# Patient Record
Sex: FEMALE | Race: WHITE | NOT HISPANIC OR LATINO | Employment: UNEMPLOYED | URBAN - METROPOLITAN AREA
[De-identification: names, ages, dates, MRNs, and addresses within clinical notes are randomized per-mention and may not be internally consistent; named-entity substitution may affect disease eponyms.]

---

## 2017-06-14 ENCOUNTER — HOSPITAL ENCOUNTER (OUTPATIENT)
Dept: RADIOLOGY | Facility: MEDICAL CENTER | Age: 57
End: 2017-06-14
Attending: PHYSICIAN ASSISTANT
Payer: MEDICAID

## 2017-06-14 DIAGNOSIS — Z12.31 ENCOUNTER FOR MAMMOGRAM TO ESTABLISH BASELINE MAMMOGRAM: ICD-10-CM

## 2017-06-14 PROCEDURE — 77063 BREAST TOMOSYNTHESIS BI: CPT

## 2017-06-22 ENCOUNTER — HOSPITAL ENCOUNTER (OUTPATIENT)
Dept: LAB | Facility: MEDICAL CENTER | Age: 57
End: 2017-06-22
Attending: UROLOGY
Payer: MEDICAID

## 2017-06-22 LAB
BUN SERPL-MCNC: 16 MG/DL (ref 8–22)
CREAT SERPL-MCNC: 0.74 MG/DL (ref 0.5–1.4)
GFR SERPL CREATININE-BSD FRML MDRD: >60 ML/MIN/1.73 M 2

## 2017-06-22 PROCEDURE — 84520 ASSAY OF UREA NITROGEN: CPT

## 2017-06-22 PROCEDURE — 82565 ASSAY OF CREATININE: CPT

## 2017-06-22 PROCEDURE — 36415 COLL VENOUS BLD VENIPUNCTURE: CPT

## 2017-07-12 ENCOUNTER — TELEPHONE (OUTPATIENT)
Dept: RADIOLOGY | Facility: MEDICAL CENTER | Age: 57
End: 2017-07-12

## 2017-08-22 ENCOUNTER — HOSPITAL ENCOUNTER (OUTPATIENT)
Dept: LAB | Facility: MEDICAL CENTER | Age: 57
End: 2017-08-22
Attending: UROLOGY
Payer: MEDICAID

## 2017-08-22 LAB
BUN SERPL-MCNC: 16 MG/DL (ref 8–22)
CREAT SERPL-MCNC: 0.73 MG/DL (ref 0.5–1.4)
GFR SERPL CREATININE-BSD FRML MDRD: >60 ML/MIN/1.73 M 2

## 2017-08-22 PROCEDURE — 84520 ASSAY OF UREA NITROGEN: CPT

## 2017-08-22 PROCEDURE — 82565 ASSAY OF CREATININE: CPT

## 2017-08-22 PROCEDURE — 36415 COLL VENOUS BLD VENIPUNCTURE: CPT

## 2017-08-23 ENCOUNTER — APPOINTMENT (OUTPATIENT)
Dept: RADIOLOGY | Facility: MEDICAL CENTER | Age: 57
End: 2017-08-23
Attending: UROLOGY
Payer: MEDICAID

## 2017-08-31 ENCOUNTER — HOSPITAL ENCOUNTER (OUTPATIENT)
Dept: RADIOLOGY | Facility: MEDICAL CENTER | Age: 57
End: 2017-08-31
Attending: UROLOGY
Payer: MEDICAID

## 2017-08-31 DIAGNOSIS — N28.9 KIDNEY DISORDER: ICD-10-CM

## 2017-08-31 PROCEDURE — 74183 MRI ABD W/O CNTR FLWD CNTR: CPT

## 2017-08-31 PROCEDURE — 700117 HCHG RX CONTRAST REV CODE 255: Performed by: UROLOGY

## 2017-08-31 PROCEDURE — A9579 GAD-BASE MR CONTRAST NOS,1ML: HCPCS | Performed by: UROLOGY

## 2017-08-31 RX ADMIN — GADODIAMIDE 15 ML: 287 INJECTION INTRAVENOUS at 14:11

## 2019-02-26 ENCOUNTER — HOSPITAL ENCOUNTER (EMERGENCY)
Facility: MEDICAL CENTER | Age: 59
End: 2019-02-26
Attending: EMERGENCY MEDICINE
Payer: MEDICAID

## 2019-02-26 VITALS
BODY MASS INDEX: 25.27 KG/M2 | WEIGHT: 133.82 LBS | DIASTOLIC BLOOD PRESSURE: 98 MMHG | HEIGHT: 61 IN | SYSTOLIC BLOOD PRESSURE: 155 MMHG | OXYGEN SATURATION: 97 % | RESPIRATION RATE: 18 BRPM | TEMPERATURE: 98 F | HEART RATE: 88 BPM

## 2019-02-26 DIAGNOSIS — M79.10 TRIGGER POINT: ICD-10-CM

## 2019-02-26 DIAGNOSIS — M54.12 CERVICAL RADICULOPATHY: ICD-10-CM

## 2019-02-26 PROCEDURE — 99284 EMERGENCY DEPT VISIT MOD MDM: CPT

## 2019-02-26 PROCEDURE — 96374 THER/PROPH/DIAG INJ IV PUSH: CPT

## 2019-02-26 PROCEDURE — 96372 THER/PROPH/DIAG INJ SC/IM: CPT | Mod: XU

## 2019-02-26 PROCEDURE — 700102 HCHG RX REV CODE 250 W/ 637 OVERRIDE(OP): Performed by: EMERGENCY MEDICINE

## 2019-02-26 PROCEDURE — 700111 HCHG RX REV CODE 636 W/ 250 OVERRIDE (IP): Performed by: EMERGENCY MEDICINE

## 2019-02-26 PROCEDURE — A9270 NON-COVERED ITEM OR SERVICE: HCPCS | Performed by: EMERGENCY MEDICINE

## 2019-02-26 PROCEDURE — 20552 NJX 1/MLT TRIGGER POINT 1/2: CPT

## 2019-02-26 RX ORDER — METHYLPREDNISOLONE 4 MG/1
TABLET ORAL
Qty: 1 KIT | Refills: 0 | Status: SHIPPED | OUTPATIENT
Start: 2019-02-26 | End: 2019-04-15

## 2019-02-26 RX ORDER — BUPIVACAINE HYDROCHLORIDE 2.5 MG/ML
10 INJECTION, SOLUTION EPIDURAL; INFILTRATION; INTRACAUDAL ONCE
Status: COMPLETED | OUTPATIENT
Start: 2019-02-26 | End: 2019-02-26

## 2019-02-26 RX ORDER — DEXAMETHASONE 4 MG/1
8 TABLET ORAL ONCE
Status: COMPLETED | OUTPATIENT
Start: 2019-02-26 | End: 2019-02-26

## 2019-02-26 RX ORDER — KETOROLAC TROMETHAMINE 30 MG/ML
15 INJECTION, SOLUTION INTRAMUSCULAR; INTRAVENOUS ONCE
Status: COMPLETED | OUTPATIENT
Start: 2019-02-26 | End: 2019-02-26

## 2019-02-26 RX ORDER — HYDROCODONE BITARTRATE AND ACETAMINOPHEN 5; 325 MG/1; MG/1
1-2 TABLET ORAL EVERY 4 HOURS PRN
Qty: 15 TAB | Refills: 0 | Status: SHIPPED | OUTPATIENT
Start: 2019-02-26 | End: 2019-03-01

## 2019-02-26 RX ORDER — TRIAMCINOLONE ACETONIDE 40 MG/ML
40 INJECTION, SUSPENSION INTRA-ARTICULAR; INTRAMUSCULAR ONCE
Status: COMPLETED | OUTPATIENT
Start: 2019-02-26 | End: 2019-02-26

## 2019-02-26 RX ADMIN — KETOROLAC TROMETHAMINE 15 MG: 30 INJECTION, SOLUTION INTRAMUSCULAR at 16:26

## 2019-02-26 RX ADMIN — BUPIVACAINE HYDROCHLORIDE 10 ML: 2.5 INJECTION, SOLUTION EPIDURAL; INFILTRATION; INTRACAUDAL; PERINEURAL at 16:30

## 2019-02-26 RX ADMIN — DEXAMETHASONE 8 MG: 4 TABLET ORAL at 16:26

## 2019-02-26 RX ADMIN — TRIAMCINOLONE ACETONIDE 40 MG: 40 INJECTION, SUSPENSION INTRA-ARTICULAR; INTRAMUSCULAR at 16:30

## 2019-02-26 NOTE — ED TRIAGE NOTES
Pt ambulatory to triage. Pt c/o right neck pain that radiates to should for 1 month. Pt seen by [place] [id number] for the same thing.     Chief Complaint   Patient presents with   • Neck Pain     Pt placed in lobby, updated on triage process. Pt educated to notified RN or triage tech if changes in condition.

## 2019-02-27 NOTE — ED NOTES
Imani Stephen discharged via ambulation with self.  Discharge instructions given and reviewed, patient educated to follow up with [facility], verbalized understanding.  Prescriptions given x 2.  All personal belongings in possession.  No questions at this time.
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
## 2019-04-15 ENCOUNTER — APPOINTMENT (OUTPATIENT)
Dept: RADIOLOGY | Facility: MEDICAL CENTER | Age: 59
End: 2019-04-15
Attending: EMERGENCY MEDICINE
Payer: MEDICAID

## 2019-04-15 ENCOUNTER — HOSPITAL ENCOUNTER (EMERGENCY)
Facility: MEDICAL CENTER | Age: 59
End: 2019-04-15
Attending: EMERGENCY MEDICINE
Payer: MEDICAID

## 2019-04-15 VITALS
HEART RATE: 117 BPM | HEIGHT: 61 IN | TEMPERATURE: 97.9 F | DIASTOLIC BLOOD PRESSURE: 110 MMHG | SYSTOLIC BLOOD PRESSURE: 198 MMHG | OXYGEN SATURATION: 94 % | RESPIRATION RATE: 15 BRPM | WEIGHT: 130.07 LBS | BODY MASS INDEX: 24.56 KG/M2

## 2019-04-15 DIAGNOSIS — R00.0 TACHYCARDIA: ICD-10-CM

## 2019-04-15 DIAGNOSIS — M25.512 ACUTE PAIN OF LEFT SHOULDER: ICD-10-CM

## 2019-04-15 LAB
ALBUMIN SERPL BCP-MCNC: 4.3 G/DL (ref 3.2–4.9)
ALBUMIN/GLOB SERPL: 1.4 G/DL
ALP SERPL-CCNC: 68 U/L (ref 30–99)
ALT SERPL-CCNC: 15 U/L (ref 2–50)
AMPHET UR QL SCN: POSITIVE
ANION GAP SERPL CALC-SCNC: 8 MMOL/L (ref 0–11.9)
APPEARANCE UR: CLEAR
AST SERPL-CCNC: 16 U/L (ref 12–45)
BACTERIA #/AREA URNS HPF: NEGATIVE /HPF
BARBITURATES UR QL SCN: NEGATIVE
BASOPHILS # BLD AUTO: 0.7 % (ref 0–1.8)
BASOPHILS # BLD: 0.05 K/UL (ref 0–0.12)
BENZODIAZ UR QL SCN: NEGATIVE
BILIRUB SERPL-MCNC: 0.4 MG/DL (ref 0.1–1.5)
BILIRUB UR QL STRIP.AUTO: NEGATIVE
BUN SERPL-MCNC: 15 MG/DL (ref 8–22)
BZE UR QL SCN: NEGATIVE
CALCIUM SERPL-MCNC: 9.1 MG/DL (ref 8.5–10.5)
CANNABINOIDS UR QL SCN: NEGATIVE
CHLORIDE SERPL-SCNC: 107 MMOL/L (ref 96–112)
CO2 SERPL-SCNC: 24 MMOL/L (ref 20–33)
COLOR UR: YELLOW
CREAT SERPL-MCNC: 0.71 MG/DL (ref 0.5–1.4)
D DIMER PPP IA.FEU-MCNC: <0.4 UG/ML (FEU) (ref 0–0.5)
EKG IMPRESSION: NORMAL
EOSINOPHIL # BLD AUTO: 0.16 K/UL (ref 0–0.51)
EOSINOPHIL NFR BLD: 2.2 % (ref 0–6.9)
EPI CELLS #/AREA URNS HPF: ABNORMAL /HPF
ERYTHROCYTE [DISTWIDTH] IN BLOOD BY AUTOMATED COUNT: 50.5 FL (ref 35.9–50)
GLOBULIN SER CALC-MCNC: 3 G/DL (ref 1.9–3.5)
GLUCOSE SERPL-MCNC: 117 MG/DL (ref 65–99)
GLUCOSE UR STRIP.AUTO-MCNC: NEGATIVE MG/DL
HCT VFR BLD AUTO: 41 % (ref 37–47)
HGB BLD-MCNC: 13.4 G/DL (ref 12–16)
HYALINE CASTS #/AREA URNS LPF: ABNORMAL /LPF
IMM GRANULOCYTES # BLD AUTO: 0.01 K/UL (ref 0–0.11)
IMM GRANULOCYTES NFR BLD AUTO: 0.1 % (ref 0–0.9)
KETONES UR STRIP.AUTO-MCNC: NEGATIVE MG/DL
LEUKOCYTE ESTERASE UR QL STRIP.AUTO: ABNORMAL
LYMPHOCYTES # BLD AUTO: 3.57 K/UL (ref 1–4.8)
LYMPHOCYTES NFR BLD: 49.6 % (ref 22–41)
MCH RBC QN AUTO: 31.8 PG (ref 27–33)
MCHC RBC AUTO-ENTMCNC: 32.7 G/DL (ref 33.6–35)
MCV RBC AUTO: 97.2 FL (ref 81.4–97.8)
METHADONE UR QL SCN: NEGATIVE
MICRO URNS: ABNORMAL
MONOCYTES # BLD AUTO: 0.79 K/UL (ref 0–0.85)
MONOCYTES NFR BLD AUTO: 11 % (ref 0–13.4)
NEUTROPHILS # BLD AUTO: 2.62 K/UL (ref 2–7.15)
NEUTROPHILS NFR BLD: 36.4 % (ref 44–72)
NITRITE UR QL STRIP.AUTO: NEGATIVE
NRBC # BLD AUTO: 0 K/UL
NRBC BLD-RTO: 0 /100 WBC
OPIATES UR QL SCN: NEGATIVE
OXYCODONE UR QL SCN: NEGATIVE
PCP UR QL SCN: NEGATIVE
PH UR STRIP.AUTO: 6 [PH]
PLATELET # BLD AUTO: 365 K/UL (ref 164–446)
PMV BLD AUTO: 9.5 FL (ref 9–12.9)
POTASSIUM SERPL-SCNC: 3.7 MMOL/L (ref 3.6–5.5)
PROPOXYPH UR QL SCN: NEGATIVE
PROT SERPL-MCNC: 7.3 G/DL (ref 6–8.2)
PROT UR QL STRIP: NEGATIVE MG/DL
RBC # BLD AUTO: 4.22 M/UL (ref 4.2–5.4)
RBC # URNS HPF: ABNORMAL /HPF
RBC UR QL AUTO: NEGATIVE
SODIUM SERPL-SCNC: 139 MMOL/L (ref 135–145)
SP GR UR STRIP.AUTO: 1.01
TROPONIN I SERPL-MCNC: <0.01 NG/ML (ref 0–0.04)
UROBILINOGEN UR STRIP.AUTO-MCNC: 0.2 MG/DL
WBC # BLD AUTO: 7.2 K/UL (ref 4.8–10.8)
WBC #/AREA URNS HPF: ABNORMAL /HPF

## 2019-04-15 PROCEDURE — 93005 ELECTROCARDIOGRAM TRACING: CPT

## 2019-04-15 PROCEDURE — 700102 HCHG RX REV CODE 250 W/ 637 OVERRIDE(OP): Performed by: EMERGENCY MEDICINE

## 2019-04-15 PROCEDURE — 85379 FIBRIN DEGRADATION QUANT: CPT

## 2019-04-15 PROCEDURE — 99284 EMERGENCY DEPT VISIT MOD MDM: CPT

## 2019-04-15 PROCEDURE — 84484 ASSAY OF TROPONIN QUANT: CPT

## 2019-04-15 PROCEDURE — A9270 NON-COVERED ITEM OR SERVICE: HCPCS | Performed by: EMERGENCY MEDICINE

## 2019-04-15 PROCEDURE — 304561 HCHG STAT O2

## 2019-04-15 PROCEDURE — 36415 COLL VENOUS BLD VENIPUNCTURE: CPT

## 2019-04-15 PROCEDURE — 87086 URINE CULTURE/COLONY COUNT: CPT

## 2019-04-15 PROCEDURE — 71045 X-RAY EXAM CHEST 1 VIEW: CPT

## 2019-04-15 PROCEDURE — 93005 ELECTROCARDIOGRAM TRACING: CPT | Performed by: EMERGENCY MEDICINE

## 2019-04-15 PROCEDURE — 80053 COMPREHEN METABOLIC PANEL: CPT

## 2019-04-15 PROCEDURE — 80307 DRUG TEST PRSMV CHEM ANLYZR: CPT

## 2019-04-15 PROCEDURE — 85025 COMPLETE CBC W/AUTO DIFF WBC: CPT

## 2019-04-15 PROCEDURE — 81001 URINALYSIS AUTO W/SCOPE: CPT | Mod: XU

## 2019-04-15 RX ORDER — METHOCARBAMOL 750 MG/1
1500 TABLET, FILM COATED ORAL 3 TIMES DAILY PRN
Qty: 22 TAB | Refills: 0 | Status: SHIPPED | OUTPATIENT
Start: 2019-04-15 | End: 2019-12-02

## 2019-04-15 RX ORDER — ASPIRIN 325 MG
325 TABLET ORAL ONCE
Status: COMPLETED | OUTPATIENT
Start: 2019-04-15 | End: 2019-04-15

## 2019-04-15 RX ORDER — SODIUM CHLORIDE 9 MG/ML
1000 INJECTION, SOLUTION INTRAVENOUS ONCE
Status: DISCONTINUED | OUTPATIENT
Start: 2019-04-15 | End: 2019-04-15 | Stop reason: HOSPADM

## 2019-04-15 RX ORDER — METHOCARBAMOL 750 MG/1
750 TABLET, FILM COATED ORAL ONCE
Status: COMPLETED | OUTPATIENT
Start: 2019-04-15 | End: 2019-04-15

## 2019-04-15 RX ADMIN — ASPIRIN 325 MG: 325 TABLET ORAL at 03:08

## 2019-04-15 RX ADMIN — METHOCARBAMOL TABLETS 750 MG: 750 TABLET, COATED ORAL at 03:15

## 2019-04-15 ASSESSMENT — ENCOUNTER SYMPTOMS
CHILLS: 1
SHORTNESS OF BREATH: 0
NECK PAIN: 1
FEVER: 0
DIAPHORESIS: 0
PALPITATIONS: 0
VOMITING: 0

## 2019-04-15 NOTE — ED TRIAGE NOTES
"Pt chidi wilson from home   Chief Complaint   Patient presents with   • Shoulder Pain     left, radiating into neck.      C/0 9/10 left shoulder pain, medicated by ems with 10mcg fentanyl and 4mg zofran with no relief.     BP (!) 198/110   Pulse (!) 114   Temp 36.6 °C (97.9 °F) (Temporal)   Resp 18   Ht 1.549 m (5' 1\")   Wt 59 kg (130 lb 1.1 oz)   SpO2 98%      History of htn, does not take medication, chest pain protocol started.   "

## 2019-04-15 NOTE — ED NOTES
"Attempted to medicate pt with fluids, pt refusing, states \"cant I just skip the fluids and go home and go to bed\" attempted to educate pt on need for rehydration. Pt continues to refuse and requests to leave AMA, ERP called to bedside to discussed risks of leaving ama, pt wishes to leave, AMA paperwork completed, iv removed and bandage in place .  "

## 2019-04-15 NOTE — ED PROVIDER NOTES
"ED Provider Note    Scribed for RENNY Sorensen II* by Abby Moore. 4/15/2019  2:44 AM    Means of Arrival: EMS  History obtained by: Patient  Limitations: None    CHIEF COMPLAINT  Chief Complaint   Patient presents with   • Shoulder Pain     left, radiating into neck.        HPI  Imani Stephen is a 58 y.o. female who presents to the Emergency Department with left shoulder pain onset one day ago. Mrs. Stephen states she woke up in the morning with a \"stiff neck.\" She endorses pain radiating to left neck and chills. Mrs. Stephen describes the pain as progressively worsening in severity, that is exacerbated when moving her left upper extremity or turning her head to the side. She denies any shortness of breath, diaphoresis, vomiting, palpitations, fevers, dysuria, dyspnea, or pain with deep breath. She reports taking OTC \"pain reliever\" medication and applying heating pads for pain management.     Mrs. Stephen denies any family history of blood clots, or any hormone supplement use. She reports a history of hypertension, but notes it is not controlled with any prescribed medications. She denies any alcohol use, but reports she smokes cigarettes daily. Mrs. Stephen reports a family history of cardiac problems, stating her sister \" of heart failure.\"     REVIEW OF SYSTEMS  Review of Systems   Constitutional: Positive for chills. Negative for diaphoresis and fever.   Respiratory: Negative for shortness of breath.         Negative for dyspnea, or pain with deep breaths.     Cardiovascular: Negative for palpitations.   Gastrointestinal: Negative for vomiting.   Genitourinary: Negative for dysuria.   Musculoskeletal: Positive for neck pain (\"stiff\").        Positive for left shoulder pain.    All other systems reviewed and are negative.    See HPI for further details.    PAST MEDICAL HISTORY   has a past medical history of Allergy; Arthritis; Depression (3/17/2010); Headache(784.0); Hyperlipidemia; Other and " "unspecified hyperlipidemia (4/20/2010); Renal cell carcinoma (10/21/2011); Renal mass; and Unspecified vitamin D deficiency (4/20/2010).    SOCIAL HISTORY  Social History     Social History Main Topics   • Smoking status: Current Every Day Smoker     Packs/day: 0.50     Years: 35.00     Types: Cigarettes   • Smokeless tobacco: Never Used   • Alcohol use No   • Drug use: No   • Sexual activity: Yes     Birth control/ protection: Post-Menopausal       SURGICAL HISTORY   has a past surgical history that includes other abdominal surgery; primary c section; abdominal exploration (1975); tubal coagulation laparoscopic bilateral; and recovery (10/4/2012).    CURRENT MEDICATIONS  Home Medications     Reviewed by Nicol Sims R.N. (Registered Nurse) on 04/15/19 at 0226  Med List Status: Complete   Medication Last Dose Status        Patient Basim Taking any Medications                       ALLERGIES  Allergies   Allergen Reactions   • Codeine Itching and Vomiting   • Pcn [Penicillins] Itching       PHYSICAL EXAM  VITAL SIGNS: BP (!) 198/110   Pulse (!) 112   Temp 36.6 °C (97.9 °F) (Temporal)   Resp 15   Ht 1.549 m (5' 1\")   Wt 59 kg (130 lb 1.1 oz)   SpO2 92%   BMI 24.58 kg/m²       Pulse ox interpretation: I interpret this pulse ox as normal.  Constitutional: Alert. Thin body habitus, pleasant 58 year old woman.  HENT: No signs of trauma, Bilateral external ears normal, Nose normal.   Eyes: Pupils are equal, Conjunctiva normal, Non-icteric.   Neck: Full range of motion, No midline spine tenderness, Supple, No stridor.   Lymphatic: No lymphadenopathy noted.   Cardiovascular: Increased heart rate to rate 120-130. Regular rhythm, no murmurs. Symmetric distal pulses. No cyanosis of extremities. No peripheral edema of extremities.  Thorax & Lungs: Normal breath sounds, No respiratory distress, No wheezing, No chest tenderness.   Abdomen: Bowel sounds normal, Soft, No tenderness, No masses, No pulsatile masses. No " peritoneal signs.  Skin: Warm, Dry, No erythema, No rash.   Back: No midline bony tenderness, No CVA tenderness.   Musculoskeletal: Tenderness to palpation along left trapezius and left shoulder blade. Pain reproducible with raising left arm. No major deformities noted.   Neurologic: Alert , Normal motor function, Normal sensory function, No focal deficits noted.   Psychiatric: Affect normal, Judgment normal, Mood normal.     DIAGNOSTIC STUDIES / PROCEDURES    LABS  Results for orders placed or performed during the hospital encounter of 04/15/19   CBC with Differential   Result Value Ref Range    WBC 7.2 4.8 - 10.8 K/uL    RBC 4.22 4.20 - 5.40 M/uL    Hemoglobin 13.4 12.0 - 16.0 g/dL    Hematocrit 41.0 37.0 - 47.0 %    MCV 97.2 81.4 - 97.8 fL    MCH 31.8 27.0 - 33.0 pg    MCHC 32.7 (L) 33.6 - 35.0 g/dL    RDW 50.5 (H) 35.9 - 50.0 fL    Platelet Count 365 164 - 446 K/uL    MPV 9.5 9.0 - 12.9 fL    Neutrophils-Polys 36.40 (L) 44.00 - 72.00 %    Lymphocytes 49.60 (H) 22.00 - 41.00 %    Monocytes 11.00 0.00 - 13.40 %    Eosinophils 2.20 0.00 - 6.90 %    Basophils 0.70 0.00 - 1.80 %    Immature Granulocytes 0.10 0.00 - 0.90 %    Nucleated RBC 0.00 /100 WBC    Neutrophils (Absolute) 2.62 2.00 - 7.15 K/uL    Lymphs (Absolute) 3.57 1.00 - 4.80 K/uL    Monos (Absolute) 0.79 0.00 - 0.85 K/uL    Eos (Absolute) 0.16 0.00 - 0.51 K/uL    Baso (Absolute) 0.05 0.00 - 0.12 K/uL    Immature Granulocytes (abs) 0.01 0.00 - 0.11 K/uL    NRBC (Absolute) 0.00 K/uL   Complete Metabolic Panel (CMP)   Result Value Ref Range    Sodium 139 135 - 145 mmol/L    Potassium 3.7 3.6 - 5.5 mmol/L    Chloride 107 96 - 112 mmol/L    Co2 24 20 - 33 mmol/L    Anion Gap 8.0 0.0 - 11.9    Glucose 117 (H) 65 - 99 mg/dL    Bun 15 8 - 22 mg/dL    Creatinine 0.71 0.50 - 1.40 mg/dL    Calcium 9.1 8.5 - 10.5 mg/dL    AST(SGOT) 16 12 - 45 U/L    ALT(SGPT) 15 2 - 50 U/L    Alkaline Phosphatase 68 30 - 99 U/L    Total Bilirubin 0.4 0.1 - 1.5 mg/dL    Albumin 4.3  3.2 - 4.9 g/dL    Total Protein 7.3 6.0 - 8.2 g/dL    Globulin 3.0 1.9 - 3.5 g/dL    A-G Ratio 1.4 g/dL   Troponin   Result Value Ref Range    Troponin I <0.01 0.00 - 0.04 ng/mL   ESTIMATED GFR   Result Value Ref Range    GFR If African American >60 >60 mL/min/1.73 m 2    GFR If Non African American >60 >60 mL/min/1.73 m 2   D-DIMER   Result Value Ref Range    D-Dimer Screen <0.40 0.00 - 0.50 ug/mL (FEU)   URINALYSIS,CULTURE IF INDICATED   Result Value Ref Range    Color Yellow     Character Clear     Specific Gravity 1.008 <1.035    Ph 6.0 5.0 - 8.0    Glucose Negative Negative mg/dL    Ketones Negative Negative mg/dL    Protein Negative Negative mg/dL    Bilirubin Negative Negative    Urobilinogen, Urine 0.2 Negative    Nitrite Negative Negative    Leukocyte Esterase Moderate (A) Negative    Occult Blood Negative Negative    Micro Urine Req Microscopic    URINE DRUG SCREEN   Result Value Ref Range    Amphetamines Urine Positive (A) Negative    Barbiturates Negative Negative    Benzodiazepines Negative Negative    Cocaine Metabolite Negative Negative    Methadone Negative Negative    Opiates Negative Negative    Oxycodone Negative Negative    Phencyclidine -Pcp Negative Negative    Propoxyphene Negative Negative    Cannabinoid Metab Negative Negative   URINE MICROSCOPIC (W/UA)   Result Value Ref Range    WBC 10-20 (A) /hpf    RBC 0-2 /hpf    Bacteria Negative None /hpf    Epithelial Cells Moderate (A) /hpf    Hyaline Cast 0-2 /lpf   EKG   Result Value Ref Range    Report       [facility] Emergency Dept.    Test Date:  2019-04-15  Pt Name:    ANTOINETTE ROBERTSON                 Department: ER  MRN:        [id number]                      Room:       [facility]  Gender:     Female                       Technician: [id number]  :        1960                   Requested By:ER TRIAGE PROTOCOL  Order #:    [id number]                    Reading MD: Keenan Fink II, MD    Measurements  Intervals                                 Axis  Rate:       115                          P:          65  OK:         128                          QRS:        30  QRSD:       84                           T:          68  QT:         360  QTc:        498    Interpretive Statements  SINUS TACHYCARDIA  PROBABLE LEFT ATRIAL ABNORMALITY  ABNRM R PROG, CONSIDER ASMI OR LEAD PLACEMENT  BORDERLINE PROLONGED QT INTERVAL  No previous ECG available for comparison    Electronically Signed On 4- 2:50:21 PDT by Keenan Fink II, MD       Pertinent Labs & Imaging studies reviewed. (See chart for details)    RADIOLOGY  DX-CHEST-PORTABLE (1 VIEW)   Final Result         1.  No acute cardiopulmonary disease.        Pertinent Labs & Imaging studies reviewed. (See chart for details)    COURSE & MEDICAL DECISION MAKING  Pertinent Labs & Imaging studies reviewed. (See chart for details)    2:21 AM Ordered for DX chest, CBC with differential, CMP, troponin, estimated GFR, and EKG to evaluate.     2:44 AM This is a 58 y.o. female who presents with left shoulder pain and tachycardia and the differential diagnosis includes but is not limited to muscle strain, PE, MI, stimulant intoxication, and dehydration.     2:46 AM Patient will be treated with ASA tablet 325 mg and Robaxin tablet 750 mg.    2:48 AM Ordered D-dimer to evaluate.     3:55 AM Ordered UA culture to evaluate.     4:24 AM Patient was reevaluated at bedside. She still has a tachycardic rate. She denies taking any stimulants or having alcohol withdrawal.. Therefore, will treat her with NS infusion 1 L for tachycardia.   HYDRATION: Based on the patient's presentation of Tachycardia the patient was given IV fluids. IV Hydration was used because oral hydration was not adequate alone. Upon recheck following hydration, the patient was she ultimately refused fuids.     4:26 AM Ordered urine drug screen to evaluate.     4:28 AM Per Nursing, the patient wants to go home.     4:31 AM Patient was reevaluated  at bedside. She does not want to stay to receive fluids. I went over our findings of tachycardia and that this could be a sign of a serious underlying etiology. She will be discharged AMA with plan as outlined below.    I discussed with the patient the risks of their decision to leave without receiving the appropriate medical care. I discussed with the patient the risks of their decision to refuse or withhold consent to receive appropriate medical care. The patient has the capacity to understand the risks and benefits described above. The patient is not intoxicated clinically, the patient's is alert and oriented and able to make a good decision in my opinion. I discussed alternative treatments with the patient. The patient was given discharge instructions and a followup plan as documented in the medical record. I have asked the patient to return at any time to the emergency department for any reason.    The patient is referred to a primary physician for blood pressure management, diabetic screening, and for all other preventative health concerns.      6:53 AM   Urine drug screen later returned positive for amphetamines. I suspect this most likely cause of tachycardia.     DISPOSITION:  Patient will be discharged home in stable condition.    FOLLOW UP:  [address] [address] [phone]  Call   For re-evaluation of symptoms within 1 week.    [facility], Emergency Dept  [address] [facility] [address] [phone]    persistent pain, trouble breathing, palpitations, losing consciousness or other serious concerns      OUTPATIENT MEDICATIONS:  Discharge Medication List as of 4/15/2019  4:35 AM      START taking these medications    Details   methocarbamol (ROBAXIN) 750 MG Tab Take 2 Tabs by mouth 3 times a day as needed (MUSCLE SPASM)., Disp-22 Tab, R-0, Print Rx Paper             FINAL IMPRESSION  1. Acute pain of left shoulder    2.  Tachycardia          Abby PALACIOS), am scribing for, and in the presence of, WALLY Sorensen II.    Electronically signed by: Abby Moore (Chris), 4/15/2019    Keenan PALACIOS II, M* personally performed the services described in this documentation, as scribed by Abby Moore in my presence, and it is both accurate and complete. C.     The note accurately reflects work and decisions made by me.  Keenan Fink II  4/15/2019  6:52 AM

## 2019-04-17 LAB
BACTERIA UR CULT: NORMAL
SIGNIFICANT IND 70042: NORMAL
SITE SITE: NORMAL
SOURCE SOURCE: NORMAL

## 2019-12-02 ENCOUNTER — HOSPITAL ENCOUNTER (INPATIENT)
Facility: MEDICAL CENTER | Age: 59
LOS: 2 days | DRG: 872 | End: 2019-12-04
Attending: EMERGENCY MEDICINE | Admitting: HOSPITALIST
Payer: MEDICAID

## 2019-12-02 ENCOUNTER — APPOINTMENT (OUTPATIENT)
Dept: RADIOLOGY | Facility: MEDICAL CENTER | Age: 59
DRG: 872 | End: 2019-12-02
Attending: EMERGENCY MEDICINE
Payer: MEDICAID

## 2019-12-02 DIAGNOSIS — N39.0 ACUTE UTI: ICD-10-CM

## 2019-12-02 DIAGNOSIS — R10.84 GENERALIZED ABDOMINAL PAIN: ICD-10-CM

## 2019-12-02 PROBLEM — A41.9 SEPSIS (HCC): Status: ACTIVE | Noted: 2019-12-02

## 2019-12-02 PROBLEM — N12 PYELONEPHRITIS: Status: ACTIVE | Noted: 2019-12-02

## 2019-12-02 LAB
ALBUMIN SERPL BCP-MCNC: 3.9 G/DL (ref 3.2–4.9)
ALBUMIN/GLOB SERPL: 1.3 G/DL
ALP SERPL-CCNC: 92 U/L (ref 30–99)
ALT SERPL-CCNC: 27 U/L (ref 2–50)
ANION GAP SERPL CALC-SCNC: 13 MMOL/L (ref 0–11.9)
APPEARANCE UR: ABNORMAL
AST SERPL-CCNC: 35 U/L (ref 12–45)
BACTERIA #/AREA URNS HPF: ABNORMAL /HPF
BASOPHILS # BLD AUTO: 0.3 % (ref 0–1.8)
BASOPHILS # BLD AUTO: 0.3 % (ref 0–1.8)
BASOPHILS # BLD: 0.02 K/UL (ref 0–0.12)
BASOPHILS # BLD: 0.03 K/UL (ref 0–0.12)
BILIRUB SERPL-MCNC: 0.5 MG/DL (ref 0.1–1.5)
BILIRUB UR QL STRIP.AUTO: NEGATIVE
BUN SERPL-MCNC: 18 MG/DL (ref 8–22)
CALCIUM SERPL-MCNC: 8.9 MG/DL (ref 8.5–10.5)
CHLORIDE SERPL-SCNC: 99 MMOL/L (ref 96–112)
CO2 SERPL-SCNC: 20 MMOL/L (ref 20–33)
COLOR UR: YELLOW
CREAT SERPL-MCNC: 0.69 MG/DL (ref 0.5–1.4)
EOSINOPHIL # BLD AUTO: 0.02 K/UL (ref 0–0.51)
EOSINOPHIL # BLD AUTO: 0.03 K/UL (ref 0–0.51)
EOSINOPHIL NFR BLD: 0.2 % (ref 0–6.9)
EOSINOPHIL NFR BLD: 0.4 % (ref 0–6.9)
EPI CELLS #/AREA URNS HPF: ABNORMAL /HPF
ERYTHROCYTE [DISTWIDTH] IN BLOOD BY AUTOMATED COUNT: 46.1 FL (ref 35.9–50)
ERYTHROCYTE [DISTWIDTH] IN BLOOD BY AUTOMATED COUNT: 46.5 FL (ref 35.9–50)
GLOBULIN SER CALC-MCNC: 3.1 G/DL (ref 1.9–3.5)
GLUCOSE SERPL-MCNC: 121 MG/DL (ref 65–99)
GLUCOSE UR STRIP.AUTO-MCNC: NEGATIVE MG/DL
HCT VFR BLD AUTO: 29.6 % (ref 37–47)
HCT VFR BLD AUTO: 34.1 % (ref 37–47)
HGB BLD-MCNC: 11 G/DL (ref 12–16)
HGB BLD-MCNC: 9.6 G/DL (ref 12–16)
HYALINE CASTS #/AREA URNS LPF: ABNORMAL /LPF
IMM GRANULOCYTES # BLD AUTO: 0.03 K/UL (ref 0–0.11)
IMM GRANULOCYTES # BLD AUTO: 0.04 K/UL (ref 0–0.11)
IMM GRANULOCYTES NFR BLD AUTO: 0.3 % (ref 0–0.9)
IMM GRANULOCYTES NFR BLD AUTO: 0.4 % (ref 0–0.9)
INR PPP: 1.17 (ref 0.87–1.13)
KETONES UR STRIP.AUTO-MCNC: NEGATIVE MG/DL
LACTATE BLD-SCNC: 1.1 MMOL/L (ref 0.5–2)
LACTATE BLD-SCNC: 1.9 MMOL/L (ref 0.5–2)
LACTATE BLD-SCNC: 2.2 MMOL/L (ref 0.5–2)
LEUKOCYTE ESTERASE UR QL STRIP.AUTO: ABNORMAL
LIPASE SERPL-CCNC: 32 U/L (ref 11–82)
LYMPHOCYTES # BLD AUTO: 0.52 K/UL (ref 1–4.8)
LYMPHOCYTES # BLD AUTO: 1.21 K/UL (ref 1–4.8)
LYMPHOCYTES NFR BLD: 15.6 % (ref 22–41)
LYMPHOCYTES NFR BLD: 4.4 % (ref 22–41)
MAGNESIUM SERPL-MCNC: 1.8 MG/DL (ref 1.5–2.5)
MCH RBC QN AUTO: 31 PG (ref 27–33)
MCH RBC QN AUTO: 31.3 PG (ref 27–33)
MCHC RBC AUTO-ENTMCNC: 32.3 G/DL (ref 33.6–35)
MCHC RBC AUTO-ENTMCNC: 32.4 G/DL (ref 33.6–35)
MCV RBC AUTO: 96.1 FL (ref 81.4–97.8)
MCV RBC AUTO: 96.4 FL (ref 81.4–97.8)
MICRO URNS: ABNORMAL
MONOCYTES # BLD AUTO: 0.94 K/UL (ref 0–0.85)
MONOCYTES # BLD AUTO: 1.51 K/UL (ref 0–0.85)
MONOCYTES NFR BLD AUTO: 12.1 % (ref 0–13.4)
MONOCYTES NFR BLD AUTO: 12.7 % (ref 0–13.4)
NEUTROPHILS # BLD AUTO: 5.52 K/UL (ref 2–7.15)
NEUTROPHILS # BLD AUTO: 9.74 K/UL (ref 2–7.15)
NEUTROPHILS NFR BLD: 71.2 % (ref 44–72)
NEUTROPHILS NFR BLD: 82.1 % (ref 44–72)
NITRITE UR QL STRIP.AUTO: NEGATIVE
NRBC # BLD AUTO: 0 K/UL
NRBC # BLD AUTO: 0 K/UL
NRBC BLD-RTO: 0 /100 WBC
NRBC BLD-RTO: 0 /100 WBC
PH UR STRIP.AUTO: 5 [PH] (ref 5–8)
PLATELET # BLD AUTO: 210 K/UL (ref 164–446)
PLATELET # BLD AUTO: 243 K/UL (ref 164–446)
PMV BLD AUTO: 10.2 FL (ref 9–12.9)
PMV BLD AUTO: 10.3 FL (ref 9–12.9)
POTASSIUM SERPL-SCNC: 4.5 MMOL/L (ref 3.6–5.5)
PROT SERPL-MCNC: 7 G/DL (ref 6–8.2)
PROT UR QL STRIP: 100 MG/DL
PROTHROMBIN TIME: 15.2 SEC (ref 12–14.6)
RBC # BLD AUTO: 3.07 M/UL (ref 4.2–5.4)
RBC # BLD AUTO: 3.55 M/UL (ref 4.2–5.4)
RBC # URNS HPF: ABNORMAL /HPF
RBC UR QL AUTO: ABNORMAL
SODIUM SERPL-SCNC: 132 MMOL/L (ref 135–145)
SP GR UR STRIP.AUTO: 1.02
UROBILINOGEN UR STRIP.AUTO-MCNC: 1 MG/DL
WBC # BLD AUTO: 11.9 K/UL (ref 4.8–10.8)
WBC # BLD AUTO: 7.8 K/UL (ref 4.8–10.8)
WBC #/AREA URNS HPF: ABNORMAL /HPF

## 2019-12-02 PROCEDURE — 81001 URINALYSIS AUTO W/SCOPE: CPT

## 2019-12-02 PROCEDURE — 87086 URINE CULTURE/COLONY COUNT: CPT

## 2019-12-02 PROCEDURE — 87186 SC STD MICRODIL/AGAR DIL: CPT

## 2019-12-02 PROCEDURE — 36415 COLL VENOUS BLD VENIPUNCTURE: CPT

## 2019-12-02 PROCEDURE — 99285 EMERGENCY DEPT VISIT HI MDM: CPT

## 2019-12-02 PROCEDURE — 770020 HCHG ROOM/CARE - TELE (206)

## 2019-12-02 PROCEDURE — 83605 ASSAY OF LACTIC ACID: CPT | Mod: 91

## 2019-12-02 PROCEDURE — A9270 NON-COVERED ITEM OR SERVICE: HCPCS | Performed by: STUDENT IN AN ORGANIZED HEALTH CARE EDUCATION/TRAINING PROGRAM

## 2019-12-02 PROCEDURE — 85025 COMPLETE CBC W/AUTO DIFF WBC: CPT

## 2019-12-02 PROCEDURE — 85610 PROTHROMBIN TIME: CPT

## 2019-12-02 PROCEDURE — 87077 CULTURE AEROBIC IDENTIFY: CPT

## 2019-12-02 PROCEDURE — 700111 HCHG RX REV CODE 636 W/ 250 OVERRIDE (IP): Performed by: EMERGENCY MEDICINE

## 2019-12-02 PROCEDURE — A9270 NON-COVERED ITEM OR SERVICE: HCPCS | Performed by: EMERGENCY MEDICINE

## 2019-12-02 PROCEDURE — 700105 HCHG RX REV CODE 258: Performed by: EMERGENCY MEDICINE

## 2019-12-02 PROCEDURE — 74177 CT ABD & PELVIS W/CONTRAST: CPT

## 2019-12-02 PROCEDURE — 83735 ASSAY OF MAGNESIUM: CPT

## 2019-12-02 PROCEDURE — 700102 HCHG RX REV CODE 250 W/ 637 OVERRIDE(OP): Performed by: EMERGENCY MEDICINE

## 2019-12-02 PROCEDURE — 96365 THER/PROPH/DIAG IV INF INIT: CPT

## 2019-12-02 PROCEDURE — 700111 HCHG RX REV CODE 636 W/ 250 OVERRIDE (IP): Performed by: STUDENT IN AN ORGANIZED HEALTH CARE EDUCATION/TRAINING PROGRAM

## 2019-12-02 PROCEDURE — 80053 COMPREHEN METABOLIC PANEL: CPT

## 2019-12-02 PROCEDURE — 99223 1ST HOSP IP/OBS HIGH 75: CPT | Mod: GC | Performed by: HOSPITALIST

## 2019-12-02 PROCEDURE — 700117 HCHG RX CONTRAST REV CODE 255: Performed by: EMERGENCY MEDICINE

## 2019-12-02 PROCEDURE — 700105 HCHG RX REV CODE 258: Performed by: STUDENT IN AN ORGANIZED HEALTH CARE EDUCATION/TRAINING PROGRAM

## 2019-12-02 PROCEDURE — 700102 HCHG RX REV CODE 250 W/ 637 OVERRIDE(OP): Performed by: STUDENT IN AN ORGANIZED HEALTH CARE EDUCATION/TRAINING PROGRAM

## 2019-12-02 PROCEDURE — 87040 BLOOD CULTURE FOR BACTERIA: CPT

## 2019-12-02 PROCEDURE — 83690 ASSAY OF LIPASE: CPT

## 2019-12-02 RX ORDER — SODIUM CHLORIDE 9 MG/ML
500 INJECTION, SOLUTION INTRAVENOUS ONCE
Status: COMPLETED | OUTPATIENT
Start: 2019-12-02 | End: 2019-12-02

## 2019-12-02 RX ORDER — NICOTINE 21 MG/24HR
21 PATCH, TRANSDERMAL 24 HOURS TRANSDERMAL
Status: DISCONTINUED | OUTPATIENT
Start: 2019-12-03 | End: 2019-12-04 | Stop reason: HOSPADM

## 2019-12-02 RX ORDER — LORAZEPAM 1 MG/1
1 TABLET ORAL ONCE
Status: COMPLETED | OUTPATIENT
Start: 2019-12-02 | End: 2019-12-02

## 2019-12-02 RX ORDER — LABETALOL HYDROCHLORIDE 5 MG/ML
10 INJECTION, SOLUTION INTRAVENOUS EVERY 4 HOURS PRN
Status: DISCONTINUED | OUTPATIENT
Start: 2019-12-02 | End: 2019-12-04 | Stop reason: HOSPADM

## 2019-12-02 RX ORDER — ACETAMINOPHEN 325 MG/1
650 TABLET ORAL EVERY 4 HOURS PRN
Status: DISCONTINUED | OUTPATIENT
Start: 2019-12-02 | End: 2019-12-04 | Stop reason: HOSPADM

## 2019-12-02 RX ORDER — ACETAMINOPHEN 325 MG/1
650 TABLET ORAL EVERY 6 HOURS PRN
Status: DISCONTINUED | OUTPATIENT
Start: 2019-12-02 | End: 2019-12-02

## 2019-12-02 RX ORDER — PROMETHAZINE HYDROCHLORIDE 25 MG/1
12.5-25 TABLET ORAL EVERY 4 HOURS PRN
Status: DISCONTINUED | OUTPATIENT
Start: 2019-12-02 | End: 2019-12-04 | Stop reason: HOSPADM

## 2019-12-02 RX ORDER — ONDANSETRON 2 MG/ML
4 INJECTION INTRAMUSCULAR; INTRAVENOUS EVERY 4 HOURS PRN
Status: DISCONTINUED | OUTPATIENT
Start: 2019-12-02 | End: 2019-12-02 | Stop reason: ALTCHOICE

## 2019-12-02 RX ORDER — SODIUM CHLORIDE, SODIUM LACTATE, POTASSIUM CHLORIDE, CALCIUM CHLORIDE 600; 310; 30; 20 MG/100ML; MG/100ML; MG/100ML; MG/100ML
INJECTION, SOLUTION INTRAVENOUS CONTINUOUS
Status: DISCONTINUED | OUTPATIENT
Start: 2019-12-02 | End: 2019-12-04

## 2019-12-02 RX ORDER — NICOTINE 21 MG/24HR
21 PATCH, TRANSDERMAL 24 HOURS TRANSDERMAL
Status: DISCONTINUED | OUTPATIENT
Start: 2019-12-02 | End: 2019-12-02

## 2019-12-02 RX ORDER — POLYETHYLENE GLYCOL 3350 17 G/17G
1 POWDER, FOR SOLUTION ORAL
Status: DISCONTINUED | OUTPATIENT
Start: 2019-12-02 | End: 2019-12-04 | Stop reason: HOSPADM

## 2019-12-02 RX ORDER — SODIUM CHLORIDE, SODIUM LACTATE, POTASSIUM CHLORIDE, AND CALCIUM CHLORIDE .6; .31; .03; .02 G/100ML; G/100ML; G/100ML; G/100ML
30 INJECTION, SOLUTION INTRAVENOUS
Status: DISCONTINUED | OUTPATIENT
Start: 2019-12-02 | End: 2019-12-04 | Stop reason: HOSPADM

## 2019-12-02 RX ORDER — ONDANSETRON 4 MG/1
4 TABLET, ORALLY DISINTEGRATING ORAL EVERY 4 HOURS PRN
Status: DISCONTINUED | OUTPATIENT
Start: 2019-12-02 | End: 2019-12-04 | Stop reason: HOSPADM

## 2019-12-02 RX ORDER — SODIUM CHLORIDE, SODIUM LACTATE, POTASSIUM CHLORIDE, AND CALCIUM CHLORIDE .6; .31; .03; .02 G/100ML; G/100ML; G/100ML; G/100ML
500 INJECTION, SOLUTION INTRAVENOUS
Status: DISCONTINUED | OUTPATIENT
Start: 2019-12-02 | End: 2019-12-04 | Stop reason: HOSPADM

## 2019-12-02 RX ORDER — SODIUM CHLORIDE 9 MG/ML
1000 INJECTION, SOLUTION INTRAVENOUS ONCE
Status: COMPLETED | OUTPATIENT
Start: 2019-12-02 | End: 2019-12-02

## 2019-12-02 RX ORDER — BISACODYL 10 MG
10 SUPPOSITORY, RECTAL RECTAL
Status: DISCONTINUED | OUTPATIENT
Start: 2019-12-02 | End: 2019-12-04 | Stop reason: HOSPADM

## 2019-12-02 RX ORDER — NICOTINE 21 MG/24HR
1 PATCH, TRANSDERMAL 24 HOURS TRANSDERMAL ONCE
Status: COMPLETED | OUTPATIENT
Start: 2019-12-02 | End: 2019-12-03

## 2019-12-02 RX ORDER — PROMETHAZINE HYDROCHLORIDE 25 MG/1
12.5-25 SUPPOSITORY RECTAL EVERY 4 HOURS PRN
Status: DISCONTINUED | OUTPATIENT
Start: 2019-12-02 | End: 2019-12-04 | Stop reason: HOSPADM

## 2019-12-02 RX ORDER — DICYCLOMINE HCL 20 MG
20 TABLET ORAL ONCE
Status: COMPLETED | OUTPATIENT
Start: 2019-12-02 | End: 2019-12-02

## 2019-12-02 RX ORDER — AMOXICILLIN 250 MG
2 CAPSULE ORAL 2 TIMES DAILY
Status: DISCONTINUED | OUTPATIENT
Start: 2019-12-02 | End: 2019-12-04 | Stop reason: HOSPADM

## 2019-12-02 RX ORDER — PROCHLORPERAZINE EDISYLATE 5 MG/ML
5-10 INJECTION INTRAMUSCULAR; INTRAVENOUS EVERY 4 HOURS PRN
Status: DISCONTINUED | OUTPATIENT
Start: 2019-12-02 | End: 2019-12-04 | Stop reason: HOSPADM

## 2019-12-02 RX ADMIN — SODIUM CHLORIDE 500 ML: 9 INJECTION, SOLUTION INTRAVENOUS at 13:49

## 2019-12-02 RX ADMIN — SODIUM CHLORIDE, POTASSIUM CHLORIDE, SODIUM LACTATE AND CALCIUM CHLORIDE: 600; 310; 30; 20 INJECTION, SOLUTION INTRAVENOUS at 19:21

## 2019-12-02 RX ADMIN — LORAZEPAM 1 MG: 1 TABLET ORAL at 14:29

## 2019-12-02 RX ADMIN — DICYCLOMINE HYDROCHLORIDE 20 MG: 20 TABLET ORAL at 12:45

## 2019-12-02 RX ADMIN — CEFTRIAXONE SODIUM 1 G: 1 INJECTION, POWDER, FOR SOLUTION INTRAMUSCULAR; INTRAVENOUS at 13:48

## 2019-12-02 RX ADMIN — NICOTINE 14 MG: 14 PATCH TRANSDERMAL at 14:45

## 2019-12-02 RX ADMIN — ACETAMINOPHEN 650 MG: 325 TABLET, FILM COATED ORAL at 19:21

## 2019-12-02 RX ADMIN — IOHEXOL 80 ML: 350 INJECTION, SOLUTION INTRAVENOUS at 13:44

## 2019-12-02 RX ADMIN — ENOXAPARIN SODIUM 40 MG: 100 INJECTION SUBCUTANEOUS at 17:50

## 2019-12-02 RX ADMIN — SODIUM CHLORIDE 1000 ML: 9 INJECTION, SOLUTION INTRAVENOUS at 12:31

## 2019-12-02 ASSESSMENT — LIFESTYLE VARIABLES
ON A TYPICAL DAY WHEN YOU DRINK ALCOHOL HOW MANY DRINKS DO YOU HAVE: 0
TOTAL SCORE: 0
HOW MANY TIMES IN THE PAST YEAR HAVE YOU HAD 5 OR MORE DRINKS IN A DAY: 0
EVER HAD A DRINK FIRST THING IN THE MORNING TO STEADY YOUR NERVES TO GET RID OF A HANGOVER: NO
CONSUMPTION TOTAL: NEGATIVE
AVERAGE NUMBER OF DAYS PER WEEK YOU HAVE A DRINK CONTAINING ALCOHOL: 0
TOTAL SCORE: 0
ALCOHOL_USE: NO
HAVE PEOPLE ANNOYED YOU BY CRITICIZING YOUR DRINKING: NO
EVER FELT BAD OR GUILTY ABOUT YOUR DRINKING: NO
DOES PATIENT WANT TO STOP DRINKING: NO
EVER_SMOKED: YES
HAVE YOU EVER FELT YOU SHOULD CUT DOWN ON YOUR DRINKING: NO
TOTAL SCORE: 0

## 2019-12-02 ASSESSMENT — ENCOUNTER SYMPTOMS
CONSTIPATION: 1
FEVER: 1
FEVER: 0
PALPITATIONS: 0
SHORTNESS OF BREATH: 1
HEADACHES: 1
DOUBLE VISION: 0
DIARRHEA: 0
BLURRED VISION: 0
SHORTNESS OF BREATH: 0
WHEEZING: 0
PHOTOPHOBIA: 0
VOMITING: 0
FLANK PAIN: 1
CHILLS: 1
NECK PAIN: 0
WEAKNESS: 0
NAUSEA: 0
CONSTIPATION: 0
FLANK PAIN: 0
NERVOUS/ANXIOUS: 0
HEADACHES: 0
HEARTBURN: 0
BRUISES/BLEEDS EASILY: 0
ABDOMINAL PAIN: 1
BACK PAIN: 0
BLOOD IN STOOL: 0
COUGH: 1
POLYDIPSIA: 0
WEAKNESS: 1
SORE THROAT: 0

## 2019-12-02 ASSESSMENT — COGNITIVE AND FUNCTIONAL STATUS - GENERAL
DAILY ACTIVITIY SCORE: 24
SUGGESTED CMS G CODE MODIFIER MOBILITY: CH
SUGGESTED CMS G CODE MODIFIER DAILY ACTIVITY: CH
MOBILITY SCORE: 24

## 2019-12-02 ASSESSMENT — PATIENT HEALTH QUESTIONNAIRE - PHQ9
SUM OF ALL RESPONSES TO PHQ9 QUESTIONS 1 AND 2: 0
1. LITTLE INTEREST OR PLEASURE IN DOING THINGS: NOT AT ALL
2. FEELING DOWN, DEPRESSED, IRRITABLE, OR HOPELESS: NOT AT ALL

## 2019-12-02 ASSESSMENT — COPD QUESTIONNAIRES
DURING THE PAST 4 WEEKS HOW MUCH DID YOU FEEL SHORT OF BREATH: SOME OF THE TIME
DO YOU EVER COUGH UP ANY MUCUS OR PHLEGM?: NO/ONLY WITH OCCASIONAL COLDS OR INFECTIONS
IN THE PAST 12 MONTHS DO YOU DO LESS THAN YOU USED TO BECAUSE OF YOUR BREATHING PROBLEMS: AGREE
COPD SCREENING SCORE: 4
HAVE YOU SMOKED AT LEAST 100 CIGARETTES IN YOUR ENTIRE LIFE: NO/DON'T KNOW

## 2019-12-02 NOTE — ED NOTES
Med rec complete per pt- denies taking meds. Allergies reviewed with pt. No ABX taken in the last 2 weeks.
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
## 2019-12-02 NOTE — ED TRIAGE NOTES
Chief Complaint   Patient presents with   • Abdominal Pain     x10 days, intermittent     Ambulatory to triage for above. Explained triage process, to waiting room. Asked to inform RN if questions or concerns arise.

## 2019-12-02 NOTE — NON-PROVIDER
Internal Medicine Medical Student Admitting History and Physical  Note Author: Nirav De Los Santos, Student    Name Imani Stephen     1960   Age/Sex 59 y.o. female   MRN [id number]   Code Status Full code       Chief Complaint:  Abdominal pain    HPI:    Imani Stephen is a 59-year-old female with a PMHx of Crohn's disease, renal mass, and vitamin D deficieny who presented to the ED with abdominal pain and is being admitted for pyelonephritis. She reports an intermittent bilateral lower abdominal pain that began 10 days ago, accompanied by chills, loss of appetite, and fatigue. She reports that the pain is a 7/10 on the pain scale but was as high as 10/10. She took ibuprofen which did not relieve the pain. She denies any nausea, vomiting, hematuria, or hematochezia. She does not report any alleviating or aggravating factors for the pain. The patient does not take any regular medications.    Review of Systems   Constitutional: Positive for chills and malaise/fatigue. Negative for fever.   HENT: Negative for sore throat.    Eyes: Negative for blurred vision.   Respiratory: Positive for cough and shortness of breath.    Cardiovascular: Negative for chest pain and palpitations.   Gastrointestinal: Positive for abdominal pain and constipation. Negative for diarrhea, nausea and vomiting.   Genitourinary: Positive for flank pain. Negative for dysuria and hematuria.   Skin: Negative for rash.   Neurological: Positive for weakness and headaches.   Psychiatric/Behavioral: Negative for suicidal ideas.     Past Medical History (chronic problems, known complications, current management)     Crohn's disease, renal mass, vitamin D deficiency, arthritis, carpal tunnel syndrome, allergic rhinitis, depression    Past Surgical History:  Patient reports a GI workup for Crohn's disease in [place] which included a colonoscopy and endoscopy. She has had abdominal surgery for trauma.      Medication  Allergy/Sensitivities:  Penicillin, codeine (she develops rash and itchiness)      Family History:  Parents are . Sister had an unspecified pelvic malignancy. She is currently . She denies a family history of colitis.    Social History:  Smokin/2 pack of cigarettes a day for 35 years  Alcohol: Denies use  Illictis: Denies use  Living situation: Lives by herself. Has a daughter who she reports is struggling with an addiction. Grandson is currently in foster care. She is currently sexually active and does not use protection.    Physical Exam     Vitals:    19 1045 19 1047   BP: 146/85    Pulse: (!) 110    Resp: 17    Temp: 36.6 °C (97.9 °F)    TempSrc: Temporal    SpO2: 93%    Weight:  51.5 kg (113 lb 8.6 oz)     Body mass index is 21.45 kg/m².  /85   Pulse (!) 110   Temp 36.6 °C (97.9 °F) (Temporal)   Resp 17   Wt 51.5 kg (113 lb 8.6 oz)   SpO2 93%   BMI 21.45 kg/m²   O2 therapy: Pulse Oximetry: 93 %, O2 Delivery: None (Room Air)    Physical Exam   Constitutional: She is well-developed, well-nourished, and in no distress.   HENT:   Head: Normocephalic and atraumatic.   Eyes: Pupils are equal, round, and reactive to light. EOM are normal.   Cardiovascular: Regular rhythm and normal heart sounds. Tachycardia present. Exam reveals no gallop and no friction rub.   No murmur heard.  Abdominal: Soft. Bowel sounds are normal. There is no hepatosplenomegaly. There is tenderness in the right upper quadrant and right lower quadrant. There is CVA tenderness. There is no rebound, no guarding and negative Suresh's sign.   Psychiatric: Her mood appears anxious. She is agitated.     Laboratory results    Recent Labs     19  1224   WBC 11.9*   RBC 3.55*   HEMOGLOBIN 11.0*   HEMATOCRIT 34.1*   MCV 96.1   MCH 31.0   RDW 46.5   PLATELETCT 243   MPV 10.2   NEUTSPOLYS 82.10*   LYMPHOCYTES 4.40*   MONOCYTES 12.70   EOSINOPHILS 0.20   BASOPHILS 0.30     Recent Labs     19  1224    SODIUM 132*   POTASSIUM 4.5   CHLORIDE 99   CO2 20   GLUCOSE 121*   BUN 18     Lactic acid: 2.2    Urinalysis: Elevated WBC and RBCs with moderate bacteria seen.    CT-ABDOMEN-PELVIS WITH  Narrative: 12/2/2019 1:26 PM    HISTORY/REASON FOR EXAM:  Intermittent abdominal pain for 10 days, gastroenteritis or colitis suspected; IV contrast only.    TECHNIQUE/EXAM DESCRIPTION:  CT scan of the abdomen and pelvis with contrast.    Contrast-enhanced helical scanning was obtained from the diaphragmatic domes through the pubic symphysis following the bolus administration of 100 mL of Omnipaque 350 nonionic contrast without complication.    Low dose optimization technique was utilized for this CT exam including automated exposure control and adjustment of the mA and/or kV according to patient size.    COMPARISON: 10/7/2014    FINDINGS:  The visualized lung bases demonstrate minimal dependent atelectasis.    There is no acute bony process.    CT Abdomen:    The liver is unremarkable.    The spleen is unremarkable.    The pancreas is unremarkable.    The gallbladder demonstrates no stones.    The adrenal glands are normal in size.    Kidneys demonstrate bilateral patchy areas of hypodense parenchyma which are ill-defined. 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 The exophytic right lower pole posterior   renal mass is not as well visualized due to surrounding ill-defined hypodense areas in the kidney. This measures approximately 1.5 x 1.6 x 1.3 cm.    The abdominal aorta is normal in caliber with atherosclerosis.    There are scattered nonspecific reactive retroperitoneal lymph nodes.    The bowel within the abdomen is unremarkable.    There is no free fluid or free air.    CT Pelvis:  There is no acute inflammatory process in the pelvis.    Uterus appears somewhat atrophic with prominent periuterine vasculature.  Impression: 1.  There are  ill-defined hypodense areas seen in both kidneys most suspicious for pyelonephritis.  2.  Right posterior inferior pole exophytic mass again seen, minimally increased in size compared with 2014.  3.  Right superior pole fat density benign renal mass previously seen is partially obscured by other hypodense areas on the current study but is relatively unchanged.      Assessment/Plan     Imani Stephen is a 59-year-old female with a PMHx of Crohn's disease, vitamin D deficiency, and renal mass with right CVA tenderness, RUQ/RLQ tenderness to palpation, and systemic symptoms of infection with a positive UA who is being admitted for pyelonephritis.    #Pyelonephritis  -Patient has UA positive for bacteria, CVA tenderness, and systemic symptoms of infection which likely means she has pyelonephritis  -IV fluids (LR)  -Ceftriaxone 2 g/day, 200 ml infusion for 5 days  -Awaiting urine cultures  -Tylenol for symptomatic relief    #Colitis  #Vitamin D deficiency  -Patient has a history of untreated Crohn's  -Stool softener prophylaxis  -Vitamin repletion    #Elevated blood pressure  -Labetalol for BP control, prn    #Elevated blood glucose  -Serial checks  -Get A1c if suspicion for diabetes    #Social history  -Patient reports family and financial problems  -Social work to assess safety at home, financial stability, and relationship with daughter/grandson

## 2019-12-02 NOTE — ED PROVIDER NOTES
"ED Provider Note    CHIEF COMPLAINT  Chief Complaint   Patient presents with   • Abdominal Pain     x10 days, intermittent       HPI  Imani Stephen is a 59 y.o. female with a history of dyslipidemia, renal cell carcinoma, and \"colitis\" who presents complaining of generalized abdominal pain for the last week.  Patient states the pain is achy, nonradiating.  She feels hot and cold and fatigued.  She states she has been diagnosed with colitis in the past and had multiple surgeries \"for a kidney problem.\"  She denies nausea, vomiting, diarrhea.  She rates her pain as a 6 out of 10 and denies any aggravating or alleviating factors.  Her last bowel movement was this morning which was looser than usual but she denies melena, diarrhea, hematochezia.  Patient has been taking aspirin 5 to 6 tablets at a time for her pain without relief.  Patient admits to weight loss but denies urinary symptoms and night sweats.      ALLERGIES  Allergies   Allergen Reactions   • Codeine Itching and Vomiting   • Pcn [Penicillins] Itching       CURRENT MEDICATIONS  Home Medications    **Home medications have not yet been reviewed for this encounter**         PAST MEDICAL HISTORY   has a past medical history of Allergy, Arthritis, Depression (3/17/2010), Headache(784.0), Hyperlipidemia, Other and unspecified hyperlipidemia (4/20/2010), Renal cell carcinoma (10/21/2011), Renal mass, and Unspecified vitamin D deficiency (4/20/2010).    SURGICAL HISTORY   has a past surgical history that includes other abdominal surgery; primary c section; abdominal exploration (1975); tubal coagulation laparoscopic bilateral; and recovery (10/4/2012).    SOCIAL HISTORY  Social History     Tobacco Use   • Smoking status: Current Every Day Smoker     Packs/day: 0.50     Years: 35.00     Pack years: 17.50     Types: Cigarettes   • Smokeless tobacco: Never Used   Substance and Sexual Activity   • Alcohol use: No   • Drug use: No   • Sexual activity: Yes     Birth " control/protection: Post-Menopausal       Family Hx:  Denies    REVIEW OF SYSTEMS  See HPI for further details.  All other systems are negative except as above in HPI.      PHYSICAL EXAM  VITAL SIGNS: /85   Pulse (!) 110   Temp 36.6 °C (97.9 °F) (Temporal)   Resp 17   Wt 51.5 kg (113 lb 8.6 oz)   SpO2 93%   BMI 21.45 kg/m²     General:  WD thin, nontoxic appearing in NAD; A+Ox3; V/S as above; tachycardic, afebrile, eating when I enter the room, here with her significant other  Skin: warm and dry; good color; no rash  HEENT: NCAT; EOMs intact; PERRL; no scleral icterus   Neck: FROM; no meningismus  Cardiovascular: Regular heart rate and rhythm.  No murmurs, rubs, or gallops; pulses 2+ bilaterally radially and DP areas  Lungs: Clear to auscultation with good air movement bilaterally.  No wheezes, rhonchi, or rales.   Abdomen: BS present; soft; minimal general tenderness, ND; no rebound, guarding, or rigidity.  No organomegaly or pulsatile mass  Extremities: TABOR x 4; no e/o trauma; no pedal edema; neg Glenn's  Neurologic: CNs III-XII grossly intact; speech clear; distal sensation intact; strength 5/5 UE/LEs  Psychiatric: Appropriate affect, normal mood    LABS  Results for orders placed or performed during the hospital encounter of 12/02/19   CBC WITH DIFFERENTIAL   Result Value Ref Range    WBC 11.9 (H) 4.8 - 10.8 K/uL    RBC 3.55 (L) 4.20 - 5.40 M/uL    Hemoglobin 11.0 (L) 12.0 - 16.0 g/dL    Hematocrit 34.1 (L) 37.0 - 47.0 %    MCV 96.1 81.4 - 97.8 fL    MCH 31.0 27.0 - 33.0 pg    MCHC 32.3 (L) 33.6 - 35.0 g/dL    RDW 46.5 35.9 - 50.0 fL    Platelet Count 243 164 - 446 K/uL    MPV 10.2 9.0 - 12.9 fL    Neutrophils-Polys 82.10 (H) 44.00 - 72.00 %    Lymphocytes 4.40 (L) 22.00 - 41.00 %    Monocytes 12.70 0.00 - 13.40 %    Eosinophils 0.20 0.00 - 6.90 %    Basophils 0.30 0.00 - 1.80 %    Immature Granulocytes 0.30 0.00 - 0.90 %    Nucleated RBC 0.00 /100 WBC    Neutrophils (Absolute) 9.74 (H) 2.00 - 7.15  K/uL    Lymphs (Absolute) 0.52 (L) 1.00 - 4.80 K/uL    Monos (Absolute) 1.51 (H) 0.00 - 0.85 K/uL    Eos (Absolute) 0.02 0.00 - 0.51 K/uL    Baso (Absolute) 0.03 0.00 - 0.12 K/uL    Immature Granulocytes (abs) 0.04 0.00 - 0.11 K/uL    NRBC (Absolute) 0.00 K/uL   URINALYSIS (UA)   Result Value Ref Range    Color Yellow     Character Cloudy (A)     Specific Gravity 1.022 <1.035    Ph 5.0 5.0 - 8.0    Glucose Negative Negative mg/dL    Ketones Negative Negative mg/dL    Protein 100 (A) Negative mg/dL    Bilirubin Negative Negative    Urobilinogen, Urine 1.0 Negative    Nitrite Negative Negative    Leukocyte Esterase Moderate (A) Negative    Occult Blood Small (A) Negative    Micro Urine Req Microscopic    LACTIC ACID   Result Value Ref Range    Lactic Acid 2.2 (H) 0.5 - 2.0 mmol/L   URINE MICROSCOPIC (W/UA)   Result Value Ref Range    -150 (A) /hpf    RBC 2-5 (A) /hpf    Bacteria Moderate (A) None /hpf    Epithelial Cells Few /hpf    Hyaline Cast 3-5 (A) /lpf           IMAGING  CT-ABDOMEN-PELVIS WITH   Final Result      1.  There are ill-defined hypodense areas seen in both kidneys most suspicious for pyelonephritis.   2.  Right posterior inferior pole exophytic mass again seen, minimally increased in size compared with 2014.   3.  Right superior pole fat density benign renal mass previously seen is partially obscured by other hypodense areas on the current study but is relatively unchanged.          MEDICAL RECORD  I have reviewed patient's medical record and pertinent results are listed below.      COURSE & MEDICAL DECISION MAKING  I have reviewed any medical record information, laboratory studies and radiographic results as noted.    Imani Stephen is a 59 y.o. female who presents complaining of generalized abdominal pain.  Patient is nontoxic but not healthy appearing and smells of tobacco smoke.  She has very minimal tenderness on exam.  I do not suspect a surgical abdomen.  However, the patient has a  history of renal cell carcinoma and heavy tobacco use.  I feel that with her longstanding abdominal pain and weight loss, CT abdomen and pelvis was indicated.  Patient reports a history of colitis but I do not find this in her epic history.    Bentyl was ordered.    NS bolus was ordered for possible dehydration related to patient's tachycardia.    Pt's HR normalized after fluid bolus.    Normal saline 30 mL's per kilo was ordered for fluid resuscitation.    2:25 PM  Rocephin was ordered earlier for likely UTI.  Urine culture ordered.  CT scan results reveal likely pyelonephritis.  I have advised the patient to stay for hospitalization, IV antibiotics, improvement of her lactic acidosis.  She has been resistant during my last 2 evaluations in the room and is quite anxious.  I will order Ativan.  Paging [facility] for admission.    14:44  I discussed the case with [facility] who agrees to hospitalize the patient.      FINAL IMPRESSION  1. Generalized abdominal pain     2. Acute UTI       Electronically signed by: Reena Matos, 12/2/2019 12:02 PM

## 2019-12-03 LAB
ALBUMIN SERPL BCP-MCNC: 3 G/DL (ref 3.2–4.9)
ALBUMIN/GLOB SERPL: 1 G/DL
ALP SERPL-CCNC: 84 U/L (ref 30–99)
ALT SERPL-CCNC: 34 U/L (ref 2–50)
ANION GAP SERPL CALC-SCNC: 9 MMOL/L (ref 0–11.9)
AST SERPL-CCNC: 39 U/L (ref 12–45)
BILIRUB SERPL-MCNC: 0.4 MG/DL (ref 0.1–1.5)
BUN SERPL-MCNC: 16 MG/DL (ref 8–22)
CALCIUM SERPL-MCNC: 8.2 MG/DL (ref 8.5–10.5)
CHLORIDE SERPL-SCNC: 102 MMOL/L (ref 96–112)
CO2 SERPL-SCNC: 23 MMOL/L (ref 20–33)
CREAT SERPL-MCNC: 0.76 MG/DL (ref 0.5–1.4)
GLOBULIN SER CALC-MCNC: 3.1 G/DL (ref 1.9–3.5)
GLUCOSE SERPL-MCNC: 142 MG/DL (ref 65–99)
LACTATE BLD-SCNC: 1.1 MMOL/L (ref 0.5–2)
POTASSIUM SERPL-SCNC: 3.7 MMOL/L (ref 3.6–5.5)
PROT SERPL-MCNC: 6.1 G/DL (ref 6–8.2)
SODIUM SERPL-SCNC: 134 MMOL/L (ref 135–145)

## 2019-12-03 PROCEDURE — 700105 HCHG RX REV CODE 258: Performed by: STUDENT IN AN ORGANIZED HEALTH CARE EDUCATION/TRAINING PROGRAM

## 2019-12-03 PROCEDURE — 700111 HCHG RX REV CODE 636 W/ 250 OVERRIDE (IP): Performed by: STUDENT IN AN ORGANIZED HEALTH CARE EDUCATION/TRAINING PROGRAM

## 2019-12-03 PROCEDURE — A9270 NON-COVERED ITEM OR SERVICE: HCPCS | Performed by: STUDENT IN AN ORGANIZED HEALTH CARE EDUCATION/TRAINING PROGRAM

## 2019-12-03 PROCEDURE — 99233 SBSQ HOSP IP/OBS HIGH 50: CPT | Mod: GC | Performed by: HOSPITALIST

## 2019-12-03 PROCEDURE — 36415 COLL VENOUS BLD VENIPUNCTURE: CPT

## 2019-12-03 PROCEDURE — 83605 ASSAY OF LACTIC ACID: CPT

## 2019-12-03 PROCEDURE — 700102 HCHG RX REV CODE 250 W/ 637 OVERRIDE(OP): Performed by: STUDENT IN AN ORGANIZED HEALTH CARE EDUCATION/TRAINING PROGRAM

## 2019-12-03 PROCEDURE — 770020 HCHG ROOM/CARE - TELE (206)

## 2019-12-03 PROCEDURE — 99406 BEHAV CHNG SMOKING 3-10 MIN: CPT

## 2019-12-03 RX ORDER — POTASSIUM CHLORIDE 20 MEQ/1
40 TABLET, EXTENDED RELEASE ORAL ONCE
Status: COMPLETED | OUTPATIENT
Start: 2019-12-03 | End: 2019-12-03

## 2019-12-03 RX ADMIN — NICOTINE TRANSDERMAL SYSTEM 21 MG: 21 PATCH, EXTENDED RELEASE TRANSDERMAL at 05:11

## 2019-12-03 RX ADMIN — ENOXAPARIN SODIUM 40 MG: 100 INJECTION SUBCUTANEOUS at 05:13

## 2019-12-03 RX ADMIN — POTASSIUM CHLORIDE 40 MEQ: 1500 TABLET, EXTENDED RELEASE ORAL at 08:10

## 2019-12-03 RX ADMIN — CEFTRIAXONE SODIUM 1 G: 1 INJECTION, POWDER, FOR SOLUTION INTRAMUSCULAR; INTRAVENOUS at 05:11

## 2019-12-03 RX ADMIN — SODIUM CHLORIDE, POTASSIUM CHLORIDE, SODIUM LACTATE AND CALCIUM CHLORIDE: 600; 310; 30; 20 INJECTION, SOLUTION INTRAVENOUS at 18:47

## 2019-12-03 RX ADMIN — ACETAMINOPHEN 650 MG: 325 TABLET, FILM COATED ORAL at 21:51

## 2019-12-03 RX ADMIN — ACETAMINOPHEN 650 MG: 325 TABLET, FILM COATED ORAL at 17:25

## 2019-12-03 RX ADMIN — ACETAMINOPHEN 650 MG: 325 TABLET, FILM COATED ORAL at 02:07

## 2019-12-03 RX ADMIN — SODIUM CHLORIDE, POTASSIUM CHLORIDE, SODIUM LACTATE AND CALCIUM CHLORIDE: 600; 310; 30; 20 INJECTION, SOLUTION INTRAVENOUS at 05:50

## 2019-12-03 ASSESSMENT — ENCOUNTER SYMPTOMS
BRUISES/BLEEDS EASILY: 0
CHILLS: 0
SORE THROAT: 0
NAUSEA: 0
DIZZINESS: 0
VOMITING: 0
PALPITATIONS: 0
NERVOUS/ANXIOUS: 1
SHORTNESS OF BREATH: 0
FEVER: 1
NECK PAIN: 0
DOUBLE VISION: 0
BACK PAIN: 0
PHOTOPHOBIA: 0
ABDOMINAL PAIN: 0
HEADACHES: 0
COUGH: 0
POLYDIPSIA: 0

## 2019-12-03 NOTE — PROGRESS NOTES
Internal Medicine Interval Note  Note Author: Sybil Vargas M.D.     Name Imani Stephen     1960   Age/Sex 59 y.o. female   MRN [id number]   Code Status FULL     After 5PM or if no immediate response to page, please call for cross-coverage  Attending/Team: EDWIGE/DARRYL See Patient List for primary contact information  Call [phone] to page    1st Call - Day Intern (R1):    2nd Call - Day Sr. Resident (R2/R3):   CASEY         Reason for interval visit  (Principal Problem)   Bilateral pyelonephritis      Interval Problem Daily Status Update  (24 hours, problem oriented, brief subjective history, new lab/imaging data pertinent to that problem)   60y/o female w/ hx of RIGHT inferior pole renal mass which has been stable on serial imaging presented w/ symptomatic bilateral pyelonephritis. Tachycardic w/ leukocytosis on presentation. Has been managed w/ IV fluids and Ceftriaxone. No acute events overnight. Resting comfortably in bed this morning. Febrile to 101.9F around 8pm. Defervesced by rounds this morning. Blood and urine cultures negative to date. Patient quite unhappy about having to stay in hospital and expressed a desire to go home- however, understands risks of leaving AMA. Significant other at bedside consoling patient about having to remain inpatient at this time.     Review of Systems   Constitutional: Positive for fever (defervesed by this morning). Negative for chills.   HENT: Negative for congestion and sore throat.    Eyes: Negative for double vision and photophobia.   Respiratory: Negative for cough and shortness of breath.    Cardiovascular: Negative for chest pain and palpitations.   Gastrointestinal: Negative for abdominal pain, nausea and vomiting.   Genitourinary: Negative for frequency and urgency.   Musculoskeletal: Negative for back pain and neck pain.   Skin: Negative for itching and rash.   Neurological: Negative for dizziness and headaches.  "  Endo/Heme/Allergies: Negative for polydipsia. Does not bruise/bleed easily.   Psychiatric/Behavioral: Negative for suicidal ideas. The patient is nervous/anxious.        Disposition/Barriers to discharge:   Requires inpatient care at this time for IV antibiotics pending blood/urine cultures.     Consultants/Specialty  Not indicated    Quality Measures  Quality-Core Measures   Reviewed items::  Medications reviewed, Labs reviewed and Radiology images reviewed  Carter catheter::  No Carter  DVT prophylaxis pharmacological::  Enoxaparin (Lovenox)  Ulcer Prophylaxis::  Not indicated and Yes  Diet: Regular    Physical Exam     Vitals:    12/02/19 2345 12/03/19 0300 12/03/19 0443 12/03/19 0801   BP: 108/58  130/74 143/73   Pulse: 91  88 92   Resp: 16  16 14   Temp: 37.6 °C (99.6 °F)  37.5 °C (99.5 °F) 37.6 °C (99.6 °F)   TempSrc: Temporal  Temporal Temporal   SpO2: 94%  93% 97%   Weight:  51.6 kg (113 lb 12.1 oz)     Height:  1.549 m (5' 0.98\")       Body mass index is 21.51 kg/m². Weight: 51.6 kg (113 lb 12.1 oz)  Oxygen Therapy:  Pulse Oximetry: 97 %, O2 (LPM): 0, O2 Delivery: None (Room Air)    Physical Exam   Constitutional: She is oriented to person, place, and time.   HENT:   Head: Normocephalic and atraumatic.   Eyes: Pupils are equal, round, and reactive to light. EOM are normal.   Neck: Normal range of motion. Neck supple.   Cardiovascular: Normal heart sounds and intact distal pulses.   Pulmonary/Chest: Effort normal and breath sounds normal.   Abdominal: Soft. Bowel sounds are normal.   Significantly reduced bilateral CVA tenderness to fist percussion   Musculoskeletal:         General: No tenderness or edema.   Neurological: She is alert and oriented to person, place, and time. GCS score is 15.   Skin: Skin is warm and dry. She is not diaphoretic.   Psychiatric: Mood and affect normal.     Assessment/Plan     * Pyelonephritis- (present on admission)  Assessment & Plan  Abdominal pain w/ bilateral CVA " tenderness  Leukocytosis trended down to 7.8 today  Febrile overnight w/ close to low grade temp today  - Will increase 1g of IV Ceftriaxone daily to 2g of IV ceftriaxone daily  - Follow up blood/urine cultures, NGTD  - Continue to trend WBC     Sepsis (HCC)- (present on admission)  Assessment & Plan  This is Sepsis Present on admission  SIRS criteria identified on my evaluation include: Tachycardia, with heart rate greater than 90 BPM and Leukocytosis, with WBC 11,900  Source is pyelonephritis  Sepsis protocol initiated  Blood/urine cultures NGTD  Leukocytosis resolved  Patient however intermittently febrile w/ a HR >90  - Will increase IV ceftriaxone to 2g daily  - Continue telemetry  - Continue to trend WBC

## 2019-12-03 NOTE — PROGRESS NOTES
Patient up to unit from ED. Patient is A&Ox4, no complaints of pain, VSS, no signs of distress. Tele monitor placed and verified by monitor room. All questions and concerns answered, bed in lowest and locked position, call light in reach, will continue to monitor.

## 2019-12-03 NOTE — RESPIRATORY CARE
" COPD EDUCATION by COPD CLINICAL EDUCATOR  12/3/2019 at 11:58 AM by Sara Gar   Smoking Cessation Intervention and education completed, 3 minutes spent on smoking cessation education with patient    Provided smoking cessation packet with \"Tips to Quit\" and flyer for \"Free Smoking Cessation Classes\".     "

## 2019-12-03 NOTE — PROGRESS NOTES
2 RN skin check complete.   Devices in place tele box.  Skin assessed under devices yes.  Confirmed pressure ulcers found on n/a.  New potential pressure ulcers noted on n/a.  The following interventions in place extra pillows, moisturizer provided    bilat heels are cracked and dry, blanching

## 2019-12-03 NOTE — CARE PLAN
Problem: Safety  Goal: Will remain free from falls  Intervention: Implement fall precautions  Note:   Safety precautions and fall prevention in place. Fall prevention education provided. Patient verbalized understanding. Bed in low locked position, bed alarm on, treaded socks on patient, call bell within reach. Patient calls appropriately as needed.      Problem: Venous Thromboembolism (VTW)/Deep Vein Thrombosis (DVT) Prevention:  Goal: Patient will participate in Venous Thrombosis (VTE)/Deep Vein Thrombosis (DVT)Prevention Measures  Intervention: Encourage ambulation/mobilization at level directed by Physical Therapy in collaboration with Interdisciplinary Team  Note:   Educated patient on importance of ambulating to help prevent blood clots.

## 2019-12-03 NOTE — DISCHARGE PLANNING
Patient is eligible for Medicaid Meds to Beds at discharge if they have coverage with [place] Medicaid, Medicaid FFS, Medicaid HMO ([facility]), or [place]. This service is provided through the [facility] if orders are received by the pharmacy prior to 4pm Monday through Friday excluding holidays. Preferred pharmacy has been changed to [facility]. Please call [phone] prior to discharge.

## 2019-12-03 NOTE — SENIOR ADMIT NOTE
[facility] INTERNAL MEDICINE SENIOR ADMIT NOTE:    Patient ID:   Name:             Imani Stephen   YOB: 1960  Age:                 59 y.o.  female   MRN:               [id number]                                                          Chief Complaint:       Abdominal discomfort since 1 week    History of Present Illness:    Mr. Stephen is a 59 y.o. female with a PMHx of Crohn's disease, vitamin D deficiency and Renal mass concerning for right renal cell carcinoma , to the emergency department at [facility] with bilateral flank pain, described as a dull chronic ache, with no associated urinary symptoms of hematuria dysuria or frequency with intermittent fevers associated with chills and rigors but no diaphoresis, or associated nausea and vomiting.  Patient states the pain is been intermittent in the beginning but has become relatively constant in the last couple of days.  In the emergency department patient was found to be tachycardic, with tachypnea, with a WBC count of 11.9, elevated anion gap of 13 with a lactic acid of 2.2 , urine analysis was cloudy with leukocyte esterase moderate and a white blood cell count of 100-150 and moderate bacteria' patient had a CT scan of the abdomen/pelvis with contrast done which revealed ill-defined hypodensity is seen in both kidneys suspicious for pyelonephritis with a right posterior inferior pole exophytic mass seen which is minimally increased in size compared with imaging done in 2014.    ROS: Positive for chills with Rigors with associated abdominal discomfort and bilateral flank pain with weakness and fatigue  EX: Tachycardia with bilateral CVA tenderness.    LABS: Positive for WBC count of 150 with moderate bacteria and leukocyte esterase with cloudy urine on urinalysis.  Lactic acid 2.2 with WBC count of 11.9 and high anion gap of 13.    IMAGING:  CT-ABDOMEN-PELVIS WITH   Final Result      1.  There are ill-defined hypodense areas seen  in both kidneys most suspicious for pyelonephritis.   2.  Right posterior inferior pole exophytic mass again seen, minimally increased in size compared with 2014.   3.  Right superior pole fat density benign renal mass previously seen is partially obscured by other hypodense areas on the current study but is relatively unchanged.          Active Ambulatory Problems     Diagnosis Date Noted   • Depression 03/17/2010   • Vitamin D deficiency 04/20/2010   • Arthritis 10/05/2010   • Neuropathic pain of hand 10/21/2011   • Chronic back pain 10/21/2011   • Allergic rhinitis 10/21/2011   • Nausea & vomiting 10/21/2011   • Carpal tunnel syndrome 01/20/2012   • Chronic neck pain 01/20/2012   • Renal mass 01/20/2012   • Renal mass, right 10/04/2012     Resolved Ambulatory Problems     Diagnosis Date Noted   • Other and unspecified hyperlipidemia 04/20/2010   • Strain of neck 10/05/2010   • Sciatica 10/05/2010   • Clear cell carcinoma of kidney (HCC) 10/21/2011     Past Medical History:   Diagnosis Date   • Allergy    • Headache(784.0)    • Hyperlipidemia    • Renal cell carcinoma 10/21/2011   • Unspecified vitamin D deficiency 4/20/2010       PHYSICAL EXAM  Vitals:   Weight/BMI: Body mass index is 21.45 kg/m².  /68   Pulse 96   Temp 36.6 °C (97.9 °F) (Temporal)   Resp 16   Wt 51.5 kg (113 lb 8.6 oz)   SpO2 95%   Vitals:    12/02/19 1045 12/02/19 1047 12/02/19 1347 12/02/19 1709   BP: 146/85  126/65 132/68   Pulse: (!) 110  97 96   Resp: 17   16   Temp: 36.6 °C (97.9 °F)      TempSrc: Temporal      SpO2: 93%  95%    Weight:  51.5 kg (113 lb 8.6 oz)       Oxygen Therapy:  Pulse Oximetry: 95 %, O2 Delivery: None (Room Air)  Physical Exam   Constitutional: She is well-developed, well-nourished, and in no distress.   HENT:   Head: Normocephalic and atraumatic.   Eyes: Pupils are equal, round, and reactive to light.   Neck: Normal range of motion. Neck supple.   Cardiovascular: Normal rate.   Tachycardia positive    Pulmonary/Chest: Effort normal and breath sounds normal.   Abdominal: Soft. Bowel sounds are normal.   BiLateral CVA tenderness positive   Musculoskeletal: Normal range of motion.   Neurological: She is alert.   Skin: Skin is warm and dry.   Psychiatric: Mood and affect normal.       IMPRESSION  #Acute pyelonephritis  #Rule out appendicular mass or abscess.  #Rule out right renal cell carcinoma.      PLAN:  -Admit medical floor.  -We will treat for acute pyelonephritis basing on CT imaging findings and urine analysis, and elevated lactic acid levels pending blood cultures and urine cultures results  -SIRS-2/4-tachycardia/leukocytosis  -IV fluids, fluid resuscitation plus blood cultures X 2, IV Ceftriaxone empiric prophylaxis pending blood cultures  -Tylenol for pain, Zofran as needed for nausea and vomiting.  -Evaluate for right renal mass-rule out renal cell carcinoma might need evaluation as an outpatient with urology for possible biopsy-signs of muscle relatively unchanged though    -Please refer to Intern[facility] Dr.Shiekh ASHRAF&STACEY for complete admission details.

## 2019-12-03 NOTE — ASSESSMENT & PLAN NOTE
Patient presented w/ several day history of abdominal pain w/ bilateral CVA tenderness  Found to have CT findings consistent w/ bilateral pyelonephritis  Leukocytosis resolved  Afebrile overnight  1 of 2 Urine Cx growing lactose fermenting GNRs  2 of 2 Blood Cx NGTD2  S/p 2 days of IV Ceftriaxone  Patient advised to remain inpatient however adamant about discharge today  Medically stable, w/o fevers, chills, leukocytosis, or abdominal/flank/back pain  Will discharge home w/ 5 day course of Omnicef based on sensitivities   Normal for race

## 2019-12-03 NOTE — ASSESSMENT & PLAN NOTE
This is Sepsis Present on admission  SIRS criteria identified on my evaluation include: Tachycardia, with heart rate greater than 90 BPM and Leukocytosis, with WBC 11,900  Source is pyelonephritis  Sepsis protocol initiated  Blood Cx NGTD2  1 of 2 Urine Cx growing GNRs  Leukocytosis resolved  Patient afebrile overnight  Denies abdominal/genitourinary symptoms  S/P 2 days of IV ceftriaxone  Medically stable for discharge home on 5 day course of Omnicef based on sensitivities and discussion w/ pharmacy

## 2019-12-03 NOTE — NON-PROVIDER
"       Internal Medicine Medical Student Note  Note Author: Nirav De Los Santos, Student    Name Imani Stephen     1960   Age/Sex 59 y.o. female   MRN [id number]   Code Status Full code       Reason for interval visit  (Principal Problem)   Pyelonephritis    Interval Problem Daily Status Update  (problem status, last 24 hours, new history, new data )   Pt reports no acute overnight events and was able to sleep okay. She was febrile overnight but currently has no fever and has had no chills or sweats. WBC is down to 7.9, fever and tachycardia have resolved. She is tolerating Rocephin well. Patient is agitated and wants to leave, she was counseled on the importance of completing her IV abx treatment.       Physical Exam       Vitals:    19 1950 19 2345 19 0300 19 0443   BP: 116/54 108/58  130/74   Pulse: 99 91  88   Resp: 16 16  16   Temp: (!) 38.8 °C (101.9 °F) 37.6 °C (99.6 °F)  37.5 °C (99.5 °F)   TempSrc: Oral Temporal  Temporal   SpO2: 93% 94%  93%   Weight: 51.6 kg (113 lb 12.1 oz)  51.6 kg (113 lb 12.1 oz)    Height:   1.549 m (5' 0.98\")      Body mass index is 21.51 kg/m². Weight: 51.6 kg (113 lb 12.1 oz)  Oxygen Therapy:  Pulse Oximetry: 93 %, O2 (LPM): 0, O2 Delivery: None (Room Air)    Physical Exam   Constitutional: She is oriented to person, place, and time. She appears distressed.   Eyes: Pupils are equal, round, and reactive to light. EOM are normal.   Cardiovascular: Normal rate and regular rhythm. Exam reveals no gallop and no friction rub.   No murmur heard.  Pulmonary/Chest: No respiratory distress. She has no wheezes. She has no rales.   Abdominal: Soft. Bowel sounds are normal. She exhibits no distension. There is tenderness. There is CVA tenderness. There is no rebound.   Musculoskeletal:         General: No edema.   Lymphadenopathy:     She has no cervical adenopathy.   Neurological: She is alert and oriented to person, place, and time. No cranial nerve deficit. "   Skin: Skin is warm and dry.        Assessment/Plan     #Pyelonephritis  -WBC down  -Patient is afebrile and tachy has resolved, no concern for sepsis  -Continue abx and IV fluids    #Colitis  #Vitamin D deficiency  -Patient has a history of untreated Crohn's  -Stool softener prophylaxis  -Vitamin repletion  -Long term therapy upon d/c     #Elevated blood pressure  -Labetalol for BP control, prn     #Elevated blood glucose  -Serial checks  -Get A1c if suspicion for diabetes     #Social history  -Patient reports family and financial problems  -Social work to assess safety at home, financial stability, and relationship with daughter/grandson

## 2019-12-03 NOTE — PROGRESS NOTES
Received Bedside report. Assumed care at 1900. This pt is AOx4, ambulatory with SBA, voiding in bathroom, reports 7/10 flank pain, and 101 fever. Tylenol given. Patient and RN discussed plan of care: questions answered. Labs noted, assessment complete, patient tolerating reg diet. Tele box in place. Pt is on RA. Call light in place, fall precautions in place, patient educated on importance of calling for assistance. No additional needs at this time. VSS

## 2019-12-04 ENCOUNTER — PATIENT OUTREACH (OUTPATIENT)
Dept: HEALTH INFORMATION MANAGEMENT | Facility: OTHER | Age: 59
End: 2019-12-04

## 2019-12-04 VITALS
WEIGHT: 117.06 LBS | SYSTOLIC BLOOD PRESSURE: 134 MMHG | OXYGEN SATURATION: 98 % | HEART RATE: 93 BPM | TEMPERATURE: 97.9 F | HEIGHT: 61 IN | BODY MASS INDEX: 22.1 KG/M2 | DIASTOLIC BLOOD PRESSURE: 81 MMHG | RESPIRATION RATE: 18 BRPM

## 2019-12-04 LAB
ANION GAP SERPL CALC-SCNC: 7 MMOL/L (ref 0–11.9)
BACTERIA UR CULT: ABNORMAL
BACTERIA UR CULT: ABNORMAL
BASOPHILS # BLD AUTO: 0.9 % (ref 0–1.8)
BASOPHILS # BLD: 0.06 K/UL (ref 0–0.12)
BUN SERPL-MCNC: 10 MG/DL (ref 8–22)
CALCIUM SERPL-MCNC: 9.2 MG/DL (ref 8.5–10.5)
CHLORIDE SERPL-SCNC: 104 MMOL/L (ref 96–112)
CO2 SERPL-SCNC: 25 MMOL/L (ref 20–33)
CREAT SERPL-MCNC: 0.56 MG/DL (ref 0.5–1.4)
EOSINOPHIL # BLD AUTO: 0.19 K/UL (ref 0–0.51)
EOSINOPHIL NFR BLD: 2.6 % (ref 0–6.9)
ERYTHROCYTE [DISTWIDTH] IN BLOOD BY AUTOMATED COUNT: 45.6 FL (ref 35.9–50)
GLUCOSE SERPL-MCNC: 108 MG/DL (ref 65–99)
HCT VFR BLD AUTO: 32.8 % (ref 37–47)
HGB BLD-MCNC: 10.5 G/DL (ref 12–16)
LYMPHOCYTES # BLD AUTO: 1.14 K/UL (ref 1–4.8)
LYMPHOCYTES NFR BLD: 15.9 % (ref 22–41)
MAGNESIUM SERPL-MCNC: 1.7 MG/DL (ref 1.5–2.5)
MANUAL DIFF BLD: NORMAL
MCH RBC QN AUTO: 30.7 PG (ref 27–33)
MCHC RBC AUTO-ENTMCNC: 32 G/DL (ref 33.6–35)
MCV RBC AUTO: 95.9 FL (ref 81.4–97.8)
MONOCYTES # BLD AUTO: 0.96 K/UL (ref 0–0.85)
MONOCYTES NFR BLD AUTO: 13.3 % (ref 0–13.4)
MORPHOLOGY BLD-IMP: NORMAL
NEUTROPHILS # BLD AUTO: 4.85 K/UL (ref 2–7.15)
NEUTROPHILS NFR BLD: 64.6 % (ref 44–72)
NEUTS BAND NFR BLD MANUAL: 2.7 % (ref 0–10)
NRBC # BLD AUTO: 0 K/UL
NRBC BLD-RTO: 0 /100 WBC
PHOSPHATE SERPL-MCNC: 3.2 MG/DL (ref 2.5–4.5)
PLATELET # BLD AUTO: 286 K/UL (ref 164–446)
PLATELET BLD QL SMEAR: NORMAL
PMV BLD AUTO: 10.6 FL (ref 9–12.9)
POTASSIUM SERPL-SCNC: 3.7 MMOL/L (ref 3.6–5.5)
RBC # BLD AUTO: 3.42 M/UL (ref 4.2–5.4)
RBC BLD AUTO: NORMAL
SIGNIFICANT IND 70042: ABNORMAL
SITE SITE: ABNORMAL
SODIUM SERPL-SCNC: 136 MMOL/L (ref 135–145)
SOURCE SOURCE: ABNORMAL
WBC # BLD AUTO: 7.2 K/UL (ref 4.8–10.8)

## 2019-12-04 PROCEDURE — 85027 COMPLETE CBC AUTOMATED: CPT

## 2019-12-04 PROCEDURE — 700111 HCHG RX REV CODE 636 W/ 250 OVERRIDE (IP): Performed by: STUDENT IN AN ORGANIZED HEALTH CARE EDUCATION/TRAINING PROGRAM

## 2019-12-04 PROCEDURE — 3E02340 INTRODUCTION OF INFLUENZA VACCINE INTO MUSCLE, PERCUTANEOUS APPROACH: ICD-10-PCS | Performed by: HOSPITALIST

## 2019-12-04 PROCEDURE — 700105 HCHG RX REV CODE 258: Performed by: STUDENT IN AN ORGANIZED HEALTH CARE EDUCATION/TRAINING PROGRAM

## 2019-12-04 PROCEDURE — 700102 HCHG RX REV CODE 250 W/ 637 OVERRIDE(OP): Performed by: STUDENT IN AN ORGANIZED HEALTH CARE EDUCATION/TRAINING PROGRAM

## 2019-12-04 PROCEDURE — 90471 IMMUNIZATION ADMIN: CPT

## 2019-12-04 PROCEDURE — A9270 NON-COVERED ITEM OR SERVICE: HCPCS | Performed by: STUDENT IN AN ORGANIZED HEALTH CARE EDUCATION/TRAINING PROGRAM

## 2019-12-04 PROCEDURE — 80048 BASIC METABOLIC PNL TOTAL CA: CPT

## 2019-12-04 PROCEDURE — 85007 BL SMEAR W/DIFF WBC COUNT: CPT

## 2019-12-04 PROCEDURE — 36415 COLL VENOUS BLD VENIPUNCTURE: CPT

## 2019-12-04 PROCEDURE — 700105 HCHG RX REV CODE 258

## 2019-12-04 PROCEDURE — 84100 ASSAY OF PHOSPHORUS: CPT

## 2019-12-04 PROCEDURE — 90686 IIV4 VACC NO PRSV 0.5 ML IM: CPT | Performed by: HOSPITALIST

## 2019-12-04 PROCEDURE — 83735 ASSAY OF MAGNESIUM: CPT

## 2019-12-04 PROCEDURE — 99239 HOSP IP/OBS DSCHRG MGMT >30: CPT | Mod: GC | Performed by: HOSPITALIST

## 2019-12-04 PROCEDURE — 700111 HCHG RX REV CODE 636 W/ 250 OVERRIDE (IP): Performed by: HOSPITALIST

## 2019-12-04 RX ORDER — POTASSIUM CHLORIDE 20 MEQ/1
40 TABLET, EXTENDED RELEASE ORAL ONCE
Status: COMPLETED | OUTPATIENT
Start: 2019-12-04 | End: 2019-12-04

## 2019-12-04 RX ORDER — SODIUM CHLORIDE 9 MG/ML
INJECTION, SOLUTION INTRAVENOUS
Status: COMPLETED
Start: 2019-12-04 | End: 2019-12-04

## 2019-12-04 RX ORDER — MAGNESIUM SULFATE HEPTAHYDRATE 40 MG/ML
2 INJECTION, SOLUTION INTRAVENOUS ONCE
Status: COMPLETED | OUTPATIENT
Start: 2019-12-04 | End: 2019-12-04

## 2019-12-04 RX ORDER — CEFDINIR 300 MG/1
300 CAPSULE ORAL 2 TIMES DAILY
Qty: 10 CAP | Refills: 0 | Status: SHIPPED | OUTPATIENT
Start: 2019-12-05 | End: 2019-12-10

## 2019-12-04 RX ADMIN — ENOXAPARIN SODIUM 40 MG: 100 INJECTION SUBCUTANEOUS at 06:18

## 2019-12-04 RX ADMIN — INFLUENZA A VIRUS A/BRISBANE/02/2018 IVR-190 (H1N1) ANTIGEN (FORMALDEHYDE INACTIVATED), INFLUENZA A VIRUS A/KANSAS/14/2017 X-327 (H3N2) ANTIGEN (FORMALDEHYDE INACTIVATED), INFLUENZA B VIRUS B/PHUKET/3073/2013 ANTIGEN (FORMALDEHYDE INACTIVATED), AND INFLUENZA B VIRUS B/MARYLAND/15/2016 BX-69A ANTIGEN (FORMALDEHYDE INACTIVATED) 0.5 ML: 15; 15; 15; 15 INJECTION, SUSPENSION INTRAMUSCULAR at 07:44

## 2019-12-04 RX ADMIN — MAGNESIUM SULFATE IN WATER 2 G: 40 INJECTION, SOLUTION INTRAVENOUS at 09:47

## 2019-12-04 RX ADMIN — CEFTRIAXONE SODIUM 2 G: 2 INJECTION, POWDER, FOR SOLUTION INTRAMUSCULAR; INTRAVENOUS at 06:14

## 2019-12-04 RX ADMIN — SODIUM CHLORIDE: 9 INJECTION, SOLUTION INTRAVENOUS at 09:45

## 2019-12-04 RX ADMIN — SENNOSIDES AND DOCUSATE SODIUM 2 TABLET: 8.6; 5 TABLET ORAL at 06:18

## 2019-12-04 RX ADMIN — POTASSIUM CHLORIDE 40 MEQ: 1500 TABLET, EXTENDED RELEASE ORAL at 09:47

## 2019-12-04 RX ADMIN — NICOTINE TRANSDERMAL SYSTEM 21 MG: 21 PATCH, EXTENDED RELEASE TRANSDERMAL at 06:19

## 2019-12-04 RX ADMIN — NICOTINE POLACRILEX 2 MG: 2 GUM, CHEWING BUCCAL at 03:47

## 2019-12-04 RX ADMIN — SODIUM CHLORIDE, POTASSIUM CHLORIDE, SODIUM LACTATE AND CALCIUM CHLORIDE: 600; 310; 30; 20 INJECTION, SOLUTION INTRAVENOUS at 06:18

## 2019-12-04 RX ADMIN — ACETAMINOPHEN 650 MG: 325 TABLET, FILM COATED ORAL at 11:46

## 2019-12-04 SDOH — ECONOMIC STABILITY: FOOD INSECURITY: WITHIN THE PAST 12 MONTHS, THE FOOD YOU BOUGHT JUST DIDN'T LAST AND YOU DIDN'T HAVE MONEY TO GET MORE.: OFTEN TRUE

## 2019-12-04 SDOH — ECONOMIC STABILITY: INCOME INSECURITY: HOW HARD IS IT FOR YOU TO PAY FOR THE VERY BASICS LIKE FOOD, HOUSING, MEDICAL CARE, AND HEATING?: VERY HARD

## 2019-12-04 SDOH — ECONOMIC STABILITY: FOOD INSECURITY: WITHIN THE PAST 12 MONTHS, YOU WORRIED THAT YOUR FOOD WOULD RUN OUT BEFORE YOU GOT MONEY TO BUY MORE.: OFTEN TRUE

## 2019-12-04 SDOH — ECONOMIC STABILITY: TRANSPORTATION INSECURITY
IN THE PAST 12 MONTHS, HAS LACK OF TRANSPORTATION KEPT YOU FROM MEETINGS, WORK, OR FROM GETTING THINGS NEEDED FOR DAILY LIVING?: NO

## 2019-12-04 SDOH — ECONOMIC STABILITY: TRANSPORTATION INSECURITY
IN THE PAST 12 MONTHS, HAS THE LACK OF TRANSPORTATION KEPT YOU FROM MEDICAL APPOINTMENTS OR FROM GETTING MEDICATIONS?: NO

## 2019-12-04 ASSESSMENT — ENCOUNTER SYMPTOMS
COUGH: 0
DEPRESSION: 0
POLYDIPSIA: 0
HEADACHES: 0
BRUISES/BLEEDS EASILY: 0
BACK PAIN: 0
DIZZINESS: 0
PHOTOPHOBIA: 0
NERVOUS/ANXIOUS: 0
PALPITATIONS: 0
DOUBLE VISION: 0
NECK PAIN: 0
NAUSEA: 0
FLANK PAIN: 0
SINUS PAIN: 0
VOMITING: 0
CHILLS: 0
FEVER: 0
SHORTNESS OF BREATH: 0

## 2019-12-04 NOTE — PROGRESS NOTES
Community Health Worker Intake completed at bedside 12/4/19 by CHANDAN Verdin.  • Social determinates of health intake completed.   • Identified financial and food security barriers.  • Contact information provided to Imani Stephen.  • Pt. Was provided [employer] information by  Leif.  • Referral to RN or SW declined at this time.   • Pt. Accepted meds-to-beds.    Pt. Lives in a house alone, has no support system, and feels confident managing her health. Pt. Does not have a phone and CHW encouraged her to [facility].    Plan:  CHANDAN Verdin will wait for pt. To [facility] to provide pt. With a food-is-medicine prescription and information on [employer] transportation resource.

## 2019-12-04 NOTE — DISCHARGE PLANNING
Medicaid Meds-to-Beds: Discharge prescription orders listed below delivered to patient's bedside. Patient counseled.       Imani Stephen   Home Medication Instructions JOSE:[id number]    Printed on:12/04/19 1242   Medication Information                      cefdinir (OMNICEF) 300 MG Cap  Take 1 Cap by mouth 2 times a day for 5 days.                 Chanell Carlton, PharmD

## 2019-12-04 NOTE — PROGRESS NOTES
Patient is A&Ox4. Discharge instructions. Personal belongings in possession with patient. PIV and tele monitor removed. Copy of discharge instructions in patient chart, signed and reviewed. Patient verbalizes the understanding of the discharge instructions. Questions and concerns addressed prior to leaving the unit.  Transported via self by walking. Patient discharged to home. Family present.

## 2019-12-04 NOTE — PROGRESS NOTES
Internal Medicine Interval Note  Note Author: Sybil Vargas M.D.     Name Imani Stephen     1960   Age/Sex 59 y.o. female   MRN [id number]   Code Status FULL     After 5PM or if no immediate response to page, please call for cross-coverage  Attending/Team: EDWIGE/DARRYL See Patient List for primary contact information  Call [phone] to page    1st Call - Day Intern (R1):    2nd Call - Day Sr. Resident (R2/R3):   CASEY         Reason for interval visit  (Principal Problem)   Bilateral pyelonephritis      Interval Problem Daily Status Update  (24 hours, problem oriented, brief subjective history, new lab/imaging data pertinent to that problem)   No acute events overnight. Resting comfortably in bed.   Tmax 100.4 at 3pm yesterday afternoon. IV ceftriaxone dose doubled.   Afebrile overnight, w/ highest recorded temp of 97.8 at 3:30am  1 of 2 Urine cultures growing lactose fermenting GNRs  2 of 2 Blood cultures NGTD  Patient denies abdominal pain or urinary symptoms  Voiding spontaneously  Denies chills, shortness of breath, nausea, vomiting, and chest pain  Patient would benefit from further inpatient monitoring, however, unwilling to stay- tearful and adamant about going home today. Medically stable for discharge home today on oral Omnicef for 5 days starting tomorrow based on sensitivities and discussion w/ floor pharmacist.    Review of Systems   Constitutional: Negative for chills and fever.   HENT: Negative for congestion and sinus pain.    Eyes: Negative for double vision and photophobia.   Respiratory: Negative for cough and shortness of breath.    Cardiovascular: Negative for chest pain and palpitations.   Gastrointestinal: Negative for nausea and vomiting.   Genitourinary: Negative for dysuria, flank pain, frequency, hematuria and urgency.   Musculoskeletal: Negative for back pain and neck pain.   Skin: Negative for itching and rash.   Neurological: Negative for dizziness  and headaches.   Endo/Heme/Allergies: Negative for polydipsia. Does not bruise/bleed easily.   Psychiatric/Behavioral: Negative for depression. The patient is not nervous/anxious.         Tearful       Disposition/Barriers to discharge:   Discharge home today on Omnicef    Consultants/Specialty  Not indicated    Quality Measures  Quality-Core Measures   Reviewed items::  Labs reviewed and Medications reviewed  Carter catheter::  No Carter  DVT prophylaxis pharmacological::  Enoxaparin (Lovenox)  Ulcer Prophylaxis::  No  Antibiotics:  Treating active infection/contamination beyond 24 hours perioperative coverage  Diet: Regular    Physical Exam     Vitals:    12/04/19 0050 12/04/19 0330 12/04/19 0500 12/04/19 0835   BP: 138/75 129/84  (!) 161/88   Pulse: 80 85  88   Resp: 16 19  18   Temp: 36.2 °C (97.2 °F) 36.6 °C (97.8 °F)  36.8 °C (98.2 °F)   TempSrc: Temporal Temporal  Temporal   SpO2: 98% 97%  95%   Weight:   53.1 kg (117 lb 1 oz)    Height:         Body mass index is 22.13 kg/m². Weight: 53.1 kg (117 lb 1 oz)  Oxygen Therapy:  Pulse Oximetry: 95 %, O2 (LPM): 0, O2 Delivery: None (Room Air)    Physical Exam   Constitutional: She is oriented to person, place, and time and well-developed, well-nourished, and in no distress.   HENT:   Head: Normocephalic and atraumatic.   Eyes: Pupils are equal, round, and reactive to light. EOM are normal.   Neck: Normal range of motion. Neck supple. No tracheal deviation present.   Cardiovascular: Normal heart sounds and intact distal pulses.   Pulmonary/Chest: Effort normal and breath sounds normal.   Abdominal: Soft. Bowel sounds are normal. She exhibits no distension. There is no tenderness. There is no rebound.   Musculoskeletal:         General: No tenderness or edema.   Neurological: She is alert and oriented to person, place, and time. GCS score is 15.   Skin: Skin is warm and dry.   Psychiatric: Affect normal.   Tearful, pleading to go home     Assessment/Plan     *  Pyelonephritis- (present on admission)  Assessment & Plan  Patient presented w/ several day history of abdominal pain w/ bilateral CVA tenderness  Found to have CT findings consistent w/ bilateral pyelonephritis  Leukocytosis resolved  Afebrile overnight  1 of 2 Urine Cx growing lactose fermenting GNRs  2 of 2 Blood Cx NGTD2  S/p 2 days of IV Ceftriaxone  Patient advised to remain inpatient however adamant about discharge today  Medically stable, w/o fevers, chills, leukocytosis, or abdominal/flank/back pain  Will discharge home w/ 5 day course of Omnicef based on sensitivities    Sepsis (HCC)- (present on admission)  Assessment & Plan  This is Sepsis Present on admission  SIRS criteria identified on my evaluation include: Tachycardia, with heart rate greater than 90 BPM and Leukocytosis, with WBC 11,900  Source is pyelonephritis  Sepsis protocol initiated  Blood Cx NGTD2  1 of 2 Urine Cx growing GNRs  Leukocytosis resolved  Patient afebrile overnight  Denies abdominal/genitourinary symptoms  S/P 2 days of IV ceftriaxone  Medically stable for discharge home on 5 day course of Omnicef based on sensitivities and discussion w/ pharmacy

## 2019-12-04 NOTE — PROGRESS NOTES
Received report from day shift RN. Patient is A&Ox4, no complaints of pain, VSS no signs of distress. Patient is tearful and agitated. Patient wants to go home, but explained to patient the importance of IV abx to treat her kidney infection. All questions and concerns answered, bed in lowest and locked position, call light in reach, will continue to monitor.

## 2019-12-04 NOTE — CARE PLAN
Problem: Safety  Goal: Will remain free from falls  Intervention: Implement fall precautions  Note:   Safety precautions and fall prevention in place. Fall prevention education provided. Patient verbalized understanding. Bed in low locked position, bed alarm on, treaded socks on patient, call bell within reach. Patient calls appropriately as needed.      Problem: Knowledge Deficit  Goal: Knowledge of disease process/condition, treatment plan, diagnostic tests, and medications will improve  Intervention: Explain information regarding disease process/condition, treatment plan, diagnostic tests, and medications and document in education  Note:   Educated patient on disease process and importance for treatment.

## 2019-12-04 NOTE — DISCHARGE INSTRUCTIONS
Discharge Instructions    Discharged to home by taxi with self. Discharged via wheelchair, hospital escort: Yes.  Special equipment needed: Not Applicable    Be sure to schedule a follow-up appointment with your primary care doctor or any specialists as instructed.     Discharge Plan:   Diet Plan: Discussed  Activity Level: Discussed  Smoking Cessation Offered: Patient Refused  Confirmed Symptoms Management: Discussed  Medication Reconciliation Updated: Yes  Influenza Vaccine Indication: Not indicated: Previously immunized this influenza season and > 8 years of age  Influenza Vaccine Given - only chart on this line when given: Influenza Vaccine Given (See MAR)    I understand that a diet low in cholesterol, fat, and sodium is recommended for good health. Unless I have been given specific instructions below for another diet, I accept this instruction as my diet prescription.   Other diet: Regular    Special Instructions: None    · Is patient discharged on Warfarin / Coumadin?   No     Depression / Suicide Risk    As you are discharged from this [facility] facility, it is important to learn how to keep safe from harming yourself.    Recognize the warning signs:  · Abrupt changes in personality, positive or negative- including increase in energy   · Giving away possessions  · Change in eating patterns- significant weight changes-  positive or negative  · Change in sleeping patterns- unable to sleep or sleeping all the time   · Unwillingness or inability to communicate  · Depression  · Unusual sadness, discouragement and loneliness  · Talk of wanting to die  · Neglect of personal appearance   · Rebelliousness- reckless behavior  · Withdrawal from people/activities they love  · Confusion- inability to concentrate     If you or a loved one observes any of these behaviors or has concerns about self-harm, here's what you can do:  · Talk about it- your feelings and reasons for harming yourself  · Remove any means that you  might use to hurt yourself (examples: pills, rope, extension cords, firearm)  · Get professional help from the community (Mental Health, Substance Abuse, psychological counseling)  · Do not be alone:Call your Safe Contact- someone whom you trust who will be there for you.  · Call your local CRISIS HOTLINE 591-2125 or 188-209-0024  · Call your local Children's Mobile Crisis Response Team [place] (954) 074-8144 or wwwHOSTING  · Call the toll free National Suicide Prevention Hotlines   · National Suicide Prevention Lifeline 533-506-DGKN (7743)  · [employer] 800-SUICIDE (803-9321)        Pyelonephritis, Adult  Introduction  Pyelonephritis is a kidney infection. The kidneys are organs that help clean your blood by moving waste out of your blood and into your pee (urine). This infection can happen quickly, or it can last for a long time. In most cases, it clears up with treatment and does not cause other problems.  Follow these instructions at home:  Medicines  · Take over-the-counter and prescription medicines only as told by your doctor.  · Take your antibiotic medicine as told by your doctor. Do not stop taking the medicine even if you start to feel better.  General instructions  · Drink enough fluid to keep your pee clear or pale yellow.  · Avoid caffeine, tea, and carbonated drinks.  · Pee (urinate) often. Avoid holding in pee for long periods of time.  · Pee before and after sex.  · After pooping (having a bowel movement), women should wipe from front to back. Use each tissue only once.  · Keep all follow-up visits as told by your doctor. This is important.  Contact a doctor if:  · You do not feel better after 2 days.  · Your symptoms get worse.  · You have a fever.  Get help right away if:  · You cannot take your medicine or drink fluids as told.  · You have chills and shaking.  · You throw up (vomit).  · You have very bad pain in your side (flank) or back.  · You feel very weak or  you pass out (faint).  This information is not intended to replace advice given to you by your health care provider. Make sure you discuss any questions you have with your health care provider.  Document Released: 01/25/2006 Document Revised: 05/25/2017 Document Reviewed: 04/11/2016  © 2017 Elsevier

## 2019-12-04 NOTE — DISCHARGE PLANNING
SW received update from RN. Patient will be D/C today, no needs besides bus pass needed. SW went to patient's room, provided her with bus pass to get home. Patient reports no other needs.

## 2019-12-04 NOTE — DISCHARGE SUMMARY
Internal Medicine Discharge Summary  Note Author: Sybil Vargas M.D.       Name Imani Stephen     1960   Age/Sex 59 y.o. female   MRN [id number]         Admit Date:  2019       Discharge Date:   2019  Service:   [facility] Internal Medicine Yellow Team  Attending Physician(s):   Brenden Santizo MD       Senior Resident(s):   Yuriy Valente MD  Tiago Resident(s):   GABRIEL Vargas MD  PCP: No established PCP      Primary Diagnosis:   Bilateral Pyelonephritis    Secondary Diagnoses:                Principal Problem:    Pyelonephritis POA: Yes  Active Problems:    Sepsis (HCC) POA: Yes  Resolved Problems:    * No resolved hospital problems. *      Hospital Summary (Brief Narrative):       58y/o female w/ hx of unbiopsied stable RIGHT renal cell carcinoma followed by Dr. Gaona w/ serial renal CTs presented the [facility] ED complaining of several day hx of intermittent colicky abdominal pain w/ no flank or genital radiation. She endorsed associated fevers and chills. She was found to be tachycardic w/ a leukocytosis of 11.9 in the ED. Computed tomography of the abdomen demonstrated findings consistent w/ bilateral pyelonephritis. The patient was started on IV ceftriaxone after sepsis bolus fluids were administered and blood/urine cultures were sent. The morning following admission, the patient's leukocytosis trended down to normal range. Her tachycardia had also improved, however, she was febrile to a Tmax of 101.9. Ceftriaxone dose was doubled to 2g daily from 1g. The patient overnight remained afebrile and reported resolution of abdominal pain and discomfort. This morning, one of the two urine cultures came back positive for lactose fermenting GNRs. Blood cultures x2 were negative for growth. The patient was switched off IV ceftriaxone to PO Omnicef. At this point, the patient was adamant about leaving and did not want to remain inpatient any longer, demanding discharge home. We  advised the patient to stay for monitoring on PO Omnicef, however, the patient refused. It was felt that because the patient was afebrile, w/o leukocytosis, and hemodynamically stable- she was stable for discharge home w/ a 5 day course of Augmentin and scheduled outpatient follow up w/ [facility]. The patient was amenable to this plan so she was discharged home safely.     Patient /Hospital Summary (Details -- Problem Oriented) :          Sepsis (HCC)  Assessment & Plan  This is Sepsis Present on admission  SIRS criteria identified on my evaluation include: Tachycardia, with heart rate greater than 90 BPM and Leukocytosis, with WBC 11,900  Source is pyelonephritis  Sepsis protocol initiated  Blood Cx NGTD2  1 of 2 Urine Cx growing GNRs  Leukocytosis resolved  Patient afebrile overnight  Denies abdominal/genitourinary symptoms  S/P 2 days of IV ceftriaxone  Medically stable for discharge home on 5 day course of Omnicef based on sensitivities and discussion w/ pharmacy          * Pyelonephritis  Assessment & Plan  Patient presented w/ several day history of abdominal pain w/ bilateral CVA tenderness  Found to have CT findings consistent w/ bilateral pyelonephritis  Leukocytosis resolved  Afebrile overnight  1 of 2 Urine Cx growing lactose fermenting GNRs  2 of 2 Blood Cx NGTD2  S/p 2 days of IV Ceftriaxone  Patient advised to remain inpatient however adamant about discharge today  Medically stable, w/o fevers, chills, leukocytosis, or abdominal/flank/back pain  Will discharge home w/ 5 day course of Omnicef based on sensitivities        Consultants:     None    Procedures:        None    Imaging/ Testing:      CT ABD/PELVIS 12/2/2019:  HISTORY/REASON FOR EXAM:  Intermittent abdominal pain for 10 days, gastroenteritis or colitis suspected; IV contrast only.  FINDINGS:  The visualized lung bases demonstrate minimal dependent atelectasis.     There is no acute bony process.     CT Abdomen:     The  liver is unremarkable.     The spleen is unremarkable.     The pancreas is unremarkable.     The gallbladder demonstrates no stones.     The adrenal glands are normal in size.     Kidneys demonstrate bilateral patchy areas of hypodense parenchyma which are ill-defined. 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 The exophytic right lower pole posterior   renal mass is not as well visualized due to surrounding ill-defined hypodense areas in the kidney. This measures approximately 1.5 x 1.6 x 1.3 cm.     The abdominal aorta is normal in caliber with atherosclerosis.     There are scattered nonspecific reactive retroperitoneal lymph nodes.     The bowel within the abdomen is unremarkable.     There is no free fluid or free air.     CT Pelvis:  There is no acute inflammatory process in the pelvis.     Uterus appears somewhat atrophic with prominent periuterine vasculature.     IMPRESSION:     1.  There are ill-defined hypodense areas seen in both kidneys most suspicious for pyelonephritis.  2.  Right posterior inferior pole exophytic mass again seen, minimally increased in size compared with 2014.  3.  Right superior pole fat density benign renal mass previously seen is partially obscured by other hypodense areas on the current study but is relatively unchanged.    Discharge Medications:         Medication Reconciliation: Completed       Medication List      START taking these medications      Instructions   cefdinir 300 MG Caps  Start taking on:  December 5, 2019  Commonly known as:  OMNICEF   Take 1 Cap by mouth 2 times a day for 5 days.  Dose:  300 mg          Disposition:   Discharge home    Diet:   Regular    Activity:   As tolerated    Instructions:      The patient was instructed to return to the ER in the event of worsening symptoms. I have counseled the patient on the importance of compliance and the patient has agreed to proceed  with all medical recommendations and follow up plan indicated above.   The patient understands that all medications come with benefits and risks. Risks may include permanent injury or death and these risks can be minimized with close reassessment and monitoring.        Primary Care Provider:    Will establish primary care w/ [facility]    Follow up appointment details :      [facility] in 7-10 days    Pending Studies:        None    Time spent on discharge day patient visit, preparing discharge paperwork and arranging for patient follow up.    Summary of follow up issues:   1. Bilateral Pyelonephritis w/ tachycardia, fevers, and mild leukocytosis. Treated w/ IV ceftriaxone for 2 days while inpatient. Blood Cx NGTD. Urine Cx lactose fermenting GNRs. Discharged on 5 day course of Omnicef.     Discharge Time (Minutes) :    60 MINS  Hospital Course Type:  Inpatient Stay >2 midnights    Most Recent Labs:    Lab Results   Component Value Date/Time    WBC 7.2 12/04/2019 03:38 AM    RBC 3.42 (L) 12/04/2019 03:38 AM    HEMOGLOBIN 10.5 (L) 12/04/2019 03:38 AM    HEMATOCRIT 32.8 (L) 12/04/2019 03:38 AM    MCV 95.9 12/04/2019 03:38 AM    MCH 30.7 12/04/2019 03:38 AM    MCHC 32.0 (L) 12/04/2019 03:38 AM    MPV 10.6 12/04/2019 03:38 AM    NEUTSPOLYS 64.60 12/04/2019 03:38 AM    LYMPHOCYTES 15.90 (L) 12/04/2019 03:38 AM    MONOCYTES 13.30 12/04/2019 03:38 AM    EOSINOPHILS 2.60 12/04/2019 03:38 AM    BASOPHILS 0.90 12/04/2019 03:38 AM      Lab Results   Component Value Date/Time    SODIUM 136 12/04/2019 03:38 AM    POTASSIUM 3.7 12/04/2019 03:38 AM    CHLORIDE 104 12/04/2019 03:38 AM    CO2 25 12/04/2019 03:38 AM    GLUCOSE 108 (H) 12/04/2019 03:38 AM    BUN 10 12/04/2019 03:38 AM    CREATININE 0.56 12/04/2019 03:38 AM      Lab Results   Component Value Date/Time    ALTSGPT 34 12/02/2019 11:43 PM    ASTSGOT 39 12/02/2019 11:43 PM    ALKPHOSPHAT 84 12/02/2019 11:43 PM    TBILIRUBIN 0.4 12/02/2019 11:43 PM    LIPASE 32  12/02/2019 12:24 PM    ALBUMIN 3.0 (L) 12/02/2019 11:43 PM    GLOBULIN 3.1 12/02/2019 11:43 PM    INR 1.17 (H) 12/02/2019 08:29 PM     Lab Results   Component Value Date/Time    PROTHROMBTM 15.2 (H) 12/02/2019 08:29 PM    INR 1.17 (H) 12/02/2019 08:29 PM

## 2019-12-04 NOTE — NON-PROVIDER
Internal Medicine Medical Student Note  Note Author: Nirav De Los Santos, Student    Name Imani Stephen     1960   Age/Sex 59 y.o. female   MRN [id number]   Code Status Full code     Reason for interval visit  (Principal Problem)   Pyelonephritis    Interval Problem Daily Status Update  (problem status, last 24 hours, new history, new data )   Patient symptoms are resolving. She is afebrile, her WBC's are decreased (7.2), and she has no CVA tenderness. She is continuing Rocephin and tolerating it well. Patient is anxious, sleepless, and is eager to leave.    ROS:  General: Denies fevers, chills, or sweats  HEENT: Denies blurry vision or hearing loss  Cardio: Denies chest pain or palpitations  Resp: Denies SOB or dyspnea  GI: Denies nausea, vomiting, constipation, diarrhea  : Denies hematuria or dysuria  MSK: Denies CVA tenderness or weakness  Neuro: Denies headache or lightheadedness  Skin: Denies rash    Physical Exam       Vitals:    19 0050 19 0330 19 0500   BP: 130/72 138/75 129/84    Pulse: 88 80 85    Resp: 16 16 19    Temp: 36.2 °C (97.2 °F) 36.2 °C (97.2 °F) 36.6 °C (97.8 °F)    TempSrc: Temporal Temporal Temporal    SpO2: 97% 98% 97%    Weight:    53.1 kg (117 lb 1 oz)   Height:         Body mass index is 22.13 kg/m². Weight: 53.1 kg (117 lb 1 oz)  Oxygen Therapy:  Pulse Oximetry: 97 %, O2 (LPM): 0, O2 Delivery: None (Room Air)    Physical Exam   Constitutional: She is oriented to person, place, and time and well-developed, well-nourished, and in no distress.   Eyes: Pupils are equal, round, and reactive to light. EOM are normal.   Neck: No thyromegaly present.   Cardiovascular: Normal rate, regular rhythm and normal heart sounds. Exam reveals no gallop and no friction rub.   No murmur heard.  Pulmonary/Chest: Effort normal and breath sounds normal. She has no wheezes. She has no rales.   Abdominal: Soft. Bowel sounds are normal. She exhibits no distension. There  is no tenderness. There is no rebound.   Musculoskeletal:         General: No tenderness.   Neurological: She is alert and oriented to person, place, and time. No cranial nerve deficit.   Skin: Skin is warm and dry. No rash noted.       Assessment/Plan     #Pyelonephritis  -Afebrile, WBC's down, no CVA tenderness  -Cultures grew gram-negative lactose fermenting rods  -IV fluids  -K+ repletion  -Finish Rocephin dose, switch to oral Augmentin on d/c    #Colitis  #Vitamin D deficiency  -Patient has a history of untreated Crohn's  -Stool softener prophylaxis  -Vitamin repletion  -Long term therapy upon d/c     #Elevated blood pressure  -Labetalol for BP control, prn     #Elevated blood glucose  -Serial checks  -Get A1c if suspicion for diabetes     #Social history  -Patient reports family and financial problems  -Social work to assess safety at home, financial stability, and relationship with daughter/grandson

## 2019-12-08 LAB
BACTERIA BLD CULT: NORMAL
BACTERIA BLD CULT: NORMAL
SIGNIFICANT IND 70042: NORMAL
SIGNIFICANT IND 70042: NORMAL
SITE SITE: NORMAL
SITE SITE: NORMAL
SOURCE SOURCE: NORMAL
SOURCE SOURCE: NORMAL

## 2019-12-11 NOTE — PROGRESS NOTES
Outcome:  ARABELLAW Lambert will d/c pt. From CCM services due to lack of contact 12/11/2019. CCM will continue plan if pt. Connects with CCM.

## 2020-10-13 ENCOUNTER — APPOINTMENT (OUTPATIENT)
Dept: RADIOLOGY | Facility: MEDICAL CENTER | Age: 60
End: 2020-10-13
Attending: EMERGENCY MEDICINE
Payer: MEDICAID

## 2020-10-13 ENCOUNTER — HOSPITAL ENCOUNTER (EMERGENCY)
Facility: MEDICAL CENTER | Age: 60
End: 2020-10-13
Attending: EMERGENCY MEDICINE
Payer: MEDICAID

## 2020-10-13 VITALS
OXYGEN SATURATION: 97 % | DIASTOLIC BLOOD PRESSURE: 75 MMHG | SYSTOLIC BLOOD PRESSURE: 142 MMHG | HEIGHT: 62 IN | RESPIRATION RATE: 16 BRPM | WEIGHT: 119.93 LBS | BODY MASS INDEX: 22.07 KG/M2 | HEART RATE: 91 BPM | TEMPERATURE: 97.7 F

## 2020-10-13 DIAGNOSIS — S29.012A STRAIN OF THORACIC BACK REGION: ICD-10-CM

## 2020-10-13 PROCEDURE — 71045 X-RAY EXAM CHEST 1 VIEW: CPT

## 2020-10-13 PROCEDURE — 700102 HCHG RX REV CODE 250 W/ 637 OVERRIDE(OP): Performed by: EMERGENCY MEDICINE

## 2020-10-13 PROCEDURE — 72128 CT CHEST SPINE W/O DYE: CPT

## 2020-10-13 PROCEDURE — A9270 NON-COVERED ITEM OR SERVICE: HCPCS | Performed by: EMERGENCY MEDICINE

## 2020-10-13 PROCEDURE — 99283 EMERGENCY DEPT VISIT LOW MDM: CPT

## 2020-10-13 RX ORDER — OXYCODONE HYDROCHLORIDE AND ACETAMINOPHEN 5; 325 MG/1; MG/1
2 TABLET ORAL ONCE
Status: COMPLETED | OUTPATIENT
Start: 2020-10-13 | End: 2020-10-13

## 2020-10-13 RX ORDER — OXYCODONE HYDROCHLORIDE AND ACETAMINOPHEN 5; 325 MG/1; MG/1
1 TABLET ORAL EVERY 4 HOURS PRN
Qty: 15 TAB | Refills: 0 | Status: SHIPPED | OUTPATIENT
Start: 2020-10-13 | End: 2020-10-16

## 2020-10-13 RX ORDER — CYCLOBENZAPRINE HCL 10 MG
10 TABLET ORAL 3 TIMES DAILY PRN
Qty: 30 TAB | Refills: 0 | Status: ON HOLD | OUTPATIENT
Start: 2020-10-13 | End: 2021-03-04

## 2020-10-13 RX ADMIN — OXYCODONE HYDROCHLORIDE AND ACETAMINOPHEN 2 TABLET: 5; 325 TABLET ORAL at 17:43

## 2020-10-13 ASSESSMENT — FIBROSIS 4 INDEX: FIB4 SCORE: 1.45

## 2020-10-14 NOTE — ED TRIAGE NOTES
"Chief Complaint   Patient presents with   • Back Pain     Pt moving boxes last week and reported feeling \"soreness\" in her left upper back and pain has gotten worse and radiating down her back now and hurts to take a deep breath or lay on that side.      BP (!) 168/100   Pulse (!) 104   Temp 36.5 °C (97.7 °F) (Temporal)   Resp 16   Ht 1.575 m (5' 2\")   Wt 54.4 kg (119 lb 14.9 oz)   SpO2 97%   BMI 21.94 kg/m²   Pt placed back in lobby, educated on triage process, and told to inform staff of any change in condition.     "
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
## 2020-10-14 NOTE — ED NOTES
Pt back to room, changed into gown    Pt resting on gurney, pt in no acute distress, pt provided call light, instructed to call if needing any assistance, instructed not to get up by self, carl in lowest position.

## 2020-10-14 NOTE — ED NOTES
Pt refused d/c vitals. Pt given d/c instructions and prescriptions and verbalized understanding. Pt left ED a/o x 4 GCS 15 ambulatory without assistance with steady gait, pt did not have a PIV upon d/c from ED.

## 2021-02-11 ENCOUNTER — APPOINTMENT (OUTPATIENT)
Dept: RADIOLOGY | Facility: MEDICAL CENTER | Age: 61
End: 2021-02-11
Attending: EMERGENCY MEDICINE
Payer: COMMERCIAL

## 2021-02-11 ENCOUNTER — HOSPITAL ENCOUNTER (EMERGENCY)
Facility: MEDICAL CENTER | Age: 61
End: 2021-02-11
Attending: EMERGENCY MEDICINE
Payer: COMMERCIAL

## 2021-02-11 VITALS
HEART RATE: 115 BPM | BODY MASS INDEX: 21.75 KG/M2 | SYSTOLIC BLOOD PRESSURE: 119 MMHG | OXYGEN SATURATION: 93 % | WEIGHT: 118.17 LBS | DIASTOLIC BLOOD PRESSURE: 62 MMHG | TEMPERATURE: 96.9 F | HEIGHT: 62 IN | RESPIRATION RATE: 20 BRPM

## 2021-02-11 DIAGNOSIS — Z20.822 SUSPECTED COVID-19 VIRUS INFECTION: ICD-10-CM

## 2021-02-11 DIAGNOSIS — K76.9 LIVER DISEASE: ICD-10-CM

## 2021-02-11 DIAGNOSIS — R06.02 SHORTNESS OF BREATH: ICD-10-CM

## 2021-02-11 DIAGNOSIS — R11.2 NAUSEA AND VOMITING, INTRACTABILITY OF VOMITING NOT SPECIFIED, UNSPECIFIED VOMITING TYPE: ICD-10-CM

## 2021-02-11 LAB
ALBUMIN SERPL BCP-MCNC: 3.7 G/DL (ref 3.2–4.9)
ALBUMIN/GLOB SERPL: 1.3 G/DL
ALP SERPL-CCNC: 97 U/L (ref 30–99)
ALT SERPL-CCNC: 71 U/L (ref 2–50)
ANION GAP SERPL CALC-SCNC: 13 MMOL/L (ref 7–16)
AST SERPL-CCNC: 64 U/L (ref 12–45)
BASOPHILS # BLD AUTO: 0.8 % (ref 0–1.8)
BASOPHILS # BLD: 0.05 K/UL (ref 0–0.12)
BILIRUB SERPL-MCNC: 0.4 MG/DL (ref 0.1–1.5)
BUN SERPL-MCNC: 16 MG/DL (ref 8–22)
CALCIUM SERPL-MCNC: 8.8 MG/DL (ref 8.5–10.5)
CHLORIDE SERPL-SCNC: 105 MMOL/L (ref 96–112)
CO2 SERPL-SCNC: 20 MMOL/L (ref 20–33)
CREAT SERPL-MCNC: 0.73 MG/DL (ref 0.5–1.4)
EOSINOPHIL # BLD AUTO: 0.1 K/UL (ref 0–0.51)
EOSINOPHIL NFR BLD: 1.5 % (ref 0–6.9)
ERYTHROCYTE [DISTWIDTH] IN BLOOD BY AUTOMATED COUNT: 50.4 FL (ref 35.9–50)
GLOBULIN SER CALC-MCNC: 2.9 G/DL (ref 1.9–3.5)
GLUCOSE SERPL-MCNC: 107 MG/DL (ref 65–99)
HCT VFR BLD AUTO: 35.7 % (ref 37–47)
HGB BLD-MCNC: 11.3 G/DL (ref 12–16)
IMM GRANULOCYTES # BLD AUTO: 0.02 K/UL (ref 0–0.11)
IMM GRANULOCYTES NFR BLD AUTO: 0.3 % (ref 0–0.9)
LIPASE SERPL-CCNC: 20 U/L (ref 11–82)
LYMPHOCYTES # BLD AUTO: 1.63 K/UL (ref 1–4.8)
LYMPHOCYTES NFR BLD: 24.5 % (ref 22–41)
MCH RBC QN AUTO: 30.6 PG (ref 27–33)
MCHC RBC AUTO-ENTMCNC: 31.7 G/DL (ref 33.6–35)
MCV RBC AUTO: 96.7 FL (ref 81.4–97.8)
MONOCYTES # BLD AUTO: 0.52 K/UL (ref 0–0.85)
MONOCYTES NFR BLD AUTO: 7.8 % (ref 0–13.4)
NEUTROPHILS # BLD AUTO: 4.34 K/UL (ref 2–7.15)
NEUTROPHILS NFR BLD: 65.1 % (ref 44–72)
NRBC # BLD AUTO: 0 K/UL
NRBC BLD-RTO: 0 /100 WBC
PLATELET # BLD AUTO: 367 K/UL (ref 164–446)
PMV BLD AUTO: 9.9 FL (ref 9–12.9)
POTASSIUM SERPL-SCNC: 3.8 MMOL/L (ref 3.6–5.5)
PROT SERPL-MCNC: 6.6 G/DL (ref 6–8.2)
RBC # BLD AUTO: 3.69 M/UL (ref 4.2–5.4)
SODIUM SERPL-SCNC: 138 MMOL/L (ref 135–145)
WBC # BLD AUTO: 6.7 K/UL (ref 4.8–10.8)

## 2021-02-11 PROCEDURE — 71045 X-RAY EXAM CHEST 1 VIEW: CPT

## 2021-02-11 PROCEDURE — 700117 HCHG RX CONTRAST REV CODE 255: Performed by: EMERGENCY MEDICINE

## 2021-02-11 PROCEDURE — 96375 TX/PRO/DX INJ NEW DRUG ADDON: CPT

## 2021-02-11 PROCEDURE — 700105 HCHG RX REV CODE 258: Performed by: EMERGENCY MEDICINE

## 2021-02-11 PROCEDURE — 80053 COMPREHEN METABOLIC PANEL: CPT

## 2021-02-11 PROCEDURE — 96374 THER/PROPH/DIAG INJ IV PUSH: CPT

## 2021-02-11 PROCEDURE — 85025 COMPLETE CBC W/AUTO DIFF WBC: CPT

## 2021-02-11 PROCEDURE — 83690 ASSAY OF LIPASE: CPT

## 2021-02-11 PROCEDURE — 99284 EMERGENCY DEPT VISIT MOD MDM: CPT

## 2021-02-11 PROCEDURE — 74177 CT ABD & PELVIS W/CONTRAST: CPT

## 2021-02-11 PROCEDURE — 700111 HCHG RX REV CODE 636 W/ 250 OVERRIDE (IP): Performed by: EMERGENCY MEDICINE

## 2021-02-11 RX ORDER — SODIUM CHLORIDE 9 MG/ML
1000 INJECTION, SOLUTION INTRAVENOUS ONCE
Status: COMPLETED | OUTPATIENT
Start: 2021-02-11 | End: 2021-02-11

## 2021-02-11 RX ORDER — ONDANSETRON 2 MG/ML
4 INJECTION INTRAMUSCULAR; INTRAVENOUS ONCE
Status: COMPLETED | OUTPATIENT
Start: 2021-02-11 | End: 2021-02-11

## 2021-02-11 RX ORDER — ONDANSETRON 4 MG/1
4 TABLET, ORALLY DISINTEGRATING ORAL EVERY 6 HOURS PRN
Qty: 8 TABLET | Refills: 0 | Status: ON HOLD | OUTPATIENT
Start: 2021-02-11 | End: 2021-03-04

## 2021-02-11 RX ADMIN — SODIUM CHLORIDE 1000 ML: 9 INJECTION, SOLUTION INTRAVENOUS at 17:48

## 2021-02-11 RX ADMIN — ONDANSETRON 4 MG: 2 INJECTION INTRAMUSCULAR; INTRAVENOUS at 17:48

## 2021-02-11 RX ADMIN — IOHEXOL 80 ML: 350 INJECTION, SOLUTION INTRAVENOUS at 19:03

## 2021-02-11 RX ADMIN — FENTANYL CITRATE 75 MCG: 50 INJECTION, SOLUTION INTRAMUSCULAR; INTRAVENOUS at 17:48

## 2021-02-11 ASSESSMENT — FIBROSIS 4 INDEX: FIB4 SCORE: 1.45

## 2021-02-11 ASSESSMENT — LIFESTYLE VARIABLES: DO YOU DRINK ALCOHOL: NO

## 2021-02-12 NOTE — ED PROVIDER NOTES
ED Provider Note    CHIEF COMPLAINT  Chief Complaint   Patient presents with   • Abdominal Pain     requesting meds for colitis and crohn's. reports flare up x1.5 weeks. lower abd pain x 1 week.    • Nausea/Vomiting/Diarrhea     diarrhea until today        HPI  Imani Stephen is a 60 y.o. female who presents to the emergency department with chief complaint of a couple of days of on and off abdominal pain nausea dry heaving and diarrhea.  She states it felt originally like a Crohn's flare but that her diarrhea actually got better over the last 2 days but the nausea and dry heaving is what is continued and now she feels a little short of breath and feels like she is had fevers at home.  She states originally it felt like a Crohn's flare with the generalized discomfort but now she is not quite so sure but she does not take anything for her Crohn's.  She does smoke cigarettes and states she felt a little short of breath today but no associated chest pain no blood in her stool or emesis    REVIEW OF SYSTEMS  Positives as above. Pertinent negatives include dysuria cough congestion headache neck stiffness chest pain flank pain dysuria hematuria unilateral weakness numbness tingling easy bleeding or bruising leg swelling dyspnea on exertion chest pain on exertion  All other review of systems are negative    PAST MEDICAL HISTORY   has a past medical history of Allergy, Arthritis, Depression (3/17/2010), Headache(784.0), Hyperlipidemia, Other and unspecified hyperlipidemia (4/20/2010), Renal cell carcinoma (10/21/2011), Renal mass, and Unspecified vitamin D deficiency (4/20/2010).    SOCIAL HISTORY  Social History     Tobacco Use   • Smoking status: Current Every Day Smoker     Packs/day: 0.50     Years: 35.00     Pack years: 17.50     Types: Cigarettes   • Smokeless tobacco: Never Used   Substance and Sexual Activity   • Alcohol use: No   • Drug use: No   • Sexual activity: Yes     Birth control/protection: Post-Menopausal  "      SURGICAL HISTORY   has a past surgical history that includes other abdominal surgery; primary c section; abdominal exploration (1975); tubal coagulation laparoscopic bilateral; and recovery (10/4/2012).    CURRENT MEDICATIONS  Home Medications     Reviewed by Greg Leigh R.N. (Registered Nurse) on 02/11/21 at 1643  Med List Status: Partial   Medication Last Dose Status   cyclobenzaprine (FLEXERIL) 10 mg Tab not taking Active                ALLERGIES  Allergies   Allergen Reactions   • Codeine Itching and Vomiting   • Pcn [Penicillins] Itching     Tolerates cephalosporins       PHYSICAL EXAM  VITAL SIGNS: /104   Pulse (!) 115   Temp 36.1 °C (96.9 °F) (Temporal)   Resp 20   Ht 1.575 m (5' 2\")   Wt 53.6 kg (118 lb 2.7 oz)   SpO2 98%   BMI 21.61 kg/m²    Pulse ox interpretation: I interpret this pulse ox as normal.  Constitutional: Alert in no apparent distress.  HENT: Normocephalic atraumatic, dry mucous membranes  Eyes: PER, Conjunctiva normal, Non-icteric.   Neck: Normal range of motion, No tenderness, Supple, No stridor.   Cardiovascular: Tachycardic regular rhythm, no murmurs.   Thorax & Lungs: Breath sounds at the bases, No respiratory distress, No wheezing, No chest tenderness.   Abdomen: Bowel sounds normal, Soft, generalized discomfort without pinpoint tenderness, No pulsatile masses. No peritoneal signs.  Skin: Warm, Dry, No erythema, No rash.   Back: No bony tenderness, No CVA tenderness.   Extremities/MSK: Intact equal distal pulses, No edema, No tenderness, No cyanosis, no major deformities noted  Neurologic: Alert and oriented x3, No focal deficits noted.         DIFFERENTIAL DIAGNOSIS AND WORK UP PLAN    This is a 60 y.o. female who presents with grouping of symptoms possibly concerning for Covid but the patient is refusing a screen at this time, it also could be a Crohn's flare colitis or diverticulitis although she does not have exactly pinpoint abdominal tenderness although she " appears dehydrated she is tachycardic she is diminished breath sounds at the bases but not as overtly tachypneic.  She denies any chest pain she states mostly is her abdomen that she feels like is making her feel short of breath.  And that she off-and-on feels short of breath secondary to her tobacco use    DIAGNOSTIC STUDIES / PROCEDURES    EKG      LABS  Pertinent Lab Findings  CBC with hemoglobin of 11 otherwise within normal limits, CMP with mildly elevated LFTs normal lipase      RADIOLOGY  DX-CHEST-PORTABLE (1 VIEW)   Final Result      Cardiomegaly.      CT-ABDOMEN-PELVIS WITH   Final Result      1.  Heterogeneous appearance of the liver with decreased attenuation and periportal edema. This likely represents presence of hepatocellular dysfunction. Portal vein is also mildly dilated and there is a small amount of ascites as well as diffuse    mesenteric edema suggesting a component of portal hypertension.      2.  . No evidence of bowel obstruction.      3.  Again seen extensive infiltrate solid appearing mass emanating from the lower pole of the right kidney measuring 14 mm in size, unchanged from comparison. This is previously been evaluated with renal MRI and CT scan.      4.  Small bilateral pleural effusions and bibasilar atelectasis.      5.  Cardiomegaly.      6.  Atherosclerotic vascular calcification.        The radiologist's interpretation of all radiological studies have been reviewed by me.      COURSE & MEDICAL DECISION MAKING  Pertinent Labs & Imaging studies reviewed. (See chart for details)    7:24 PM  I reassessed patient at the bedside now she is had some hypoxia on my examination she dropped in the high 80s despite IV fluids she still tachycardic her CT scan is showing some evidence of portal vein hypertension she states she used to be a severe alcoholic but has not really been drinking much lately she is not tender in the right upper quadrant so I am unsure if this is new or old she also has  "a chronic renal mass which appears unchanged.    Going to go forward for chest x-ray although her breath sounds have not really changed she could have a pulmonary embolism the patient refuses any further imaging beyond a chest x-ray she does not want another CT scan or D-dimer added to her labs  And she still continues to refuse Covid testing at this time.  I have already expressed my wishes that I might like to bring her in the hospital especially with his borderline hypoxia and continued tachycardia that she is adamantly refusing stating that she just wishes to go home now that she is feeling less nauseated    8:06 PM  I reassessed patient after chest x-ray shows no cardiomegaly after this time for an EKG troponin D-dimer and she states she just wishes to go home she feels less nauseated she gets little short of breath here and there were the setting of tobacco use but she is feeling a lot better she wants something for nausea at home but otherwise she will come back only if her symptoms worsen.    At this time she is going to leave AGAINST MEDICAL ADVICE I discussed that she could have something going on especially with his cardiomegaly and portal hypertension such as right-sided heart failure pulmonary embolism which could in fact lead to death she states she will come back if she feels worse and understands and wishes to leave    /62   Pulse (!) 115   Temp 36.1 °C (96.9 °F) (Temporal)   Resp 20   Ht 1.575 m (5' 2\")   Wt 53.6 kg (118 lb 2.7 oz)   SpO2 93%   BMI 21.61 kg/m²       I verified that the patient was wearing a mask and I was wearing appropriate PPE every time I entered the room. The patient's mask was on the patient at all times during my encounter except for a brief view of the oropharynx.    The patient will return for new or worsening symptoms and is stable at the time of discharge.    The patient is referred to a primary physician for blood pressure management, diabetic screening, and " for all other preventative health concerns.    DISPOSITION:  Patient will be discharged home in stable condition.    FOLLOW UP:  [facility], Emergency Dept  [address]  [place] [address] [phone]    If symptoms worsen      OUTPATIENT MEDICATIONS:  Discharge Medication List as of 2/11/2021  8:23 PM      START taking these medications    Details   ondansetron (ZOFRAN ODT) 4 MG TABLET DISPERSIBLE Take 1 tablet by mouth every 6 hours as needed., Disp-8 tablet, R-0, Normal                 FINAL IMPRESSION  1. Nausea and vomiting, intractability of vomiting not specified, unspecified vomiting type     2. Shortness of breath     3. Liver disease     4. Suspected COVID-19 virus infection             Electronically signed by: Leandra Rodriguez M.D., 2/11/2021 5:15 PM    This dictation has been created using voice recognition software and/or scribes. The accuracy of the dictation is limited by the abilities of the software and the expertise of the scribes. I expect there may be some errors of grammar and possibly content. I made every attempt to manually correct the errors within my dictation. However, errors related to voice recognition software and/or scribes may still exist and should be interpreted within the appropriate context.

## 2021-02-12 NOTE — ED TRIAGE NOTES
"Chief Complaint   Patient presents with   • Abdominal Pain     requesting meds for colitis and crohn's. reports flare up x1.5 weeks. lower abd pain x 1 week.    • Nausea/Vomiting/Diarrhea     diarrhea until today      Pt to triage for above. NAD noted. VSS.   Denies ETOH or drugs.     Denies covid s/sx, denies travel or contact with it.     /104   Pulse (!) 115   Temp 36.1 °C (96.9 °F) (Temporal)   Resp 20   Ht 1.575 m (5' 2\")   Wt 53.6 kg (118 lb 2.7 oz)   SpO2 98%   BMI 21.61 kg/m²     "

## 2021-02-12 NOTE — ED NOTES
Pt states understanding of potential risk as discussed with physician. Pt states she wishes to leave. States understanding that she can return for care at any time. Speaking without signs of distress. Ambulatory with stable. Steady gait.

## 2021-02-20 ENCOUNTER — APPOINTMENT (OUTPATIENT)
Dept: RADIOLOGY | Facility: MEDICAL CENTER | Age: 61
DRG: 024 | End: 2021-02-20
Attending: EMERGENCY MEDICINE
Payer: COMMERCIAL

## 2021-02-20 ENCOUNTER — APPOINTMENT (OUTPATIENT)
Dept: RADIOLOGY | Facility: MEDICAL CENTER | Age: 61
DRG: 024 | End: 2021-02-20
Attending: NURSE PRACTITIONER
Payer: COMMERCIAL

## 2021-02-20 ENCOUNTER — APPOINTMENT (OUTPATIENT)
Dept: CARDIOLOGY | Facility: MEDICAL CENTER | Age: 61
DRG: 024 | End: 2021-02-20
Attending: INTERNAL MEDICINE
Payer: COMMERCIAL

## 2021-02-20 ENCOUNTER — HOSPITAL ENCOUNTER (INPATIENT)
Facility: MEDICAL CENTER | Age: 61
LOS: 5 days | DRG: 024 | End: 2021-02-25
Attending: EMERGENCY MEDICINE | Admitting: INTERNAL MEDICINE
Payer: COMMERCIAL

## 2021-02-20 DIAGNOSIS — G83.9: ICD-10-CM

## 2021-02-20 DIAGNOSIS — I63.512 CEREBROVASCULAR ACCIDENT (CVA) DUE TO OCCLUSION OF LEFT MIDDLE CEREBRAL ARTERY (HCC): ICD-10-CM

## 2021-02-20 DIAGNOSIS — I63.9: ICD-10-CM

## 2021-02-20 PROBLEM — Z92.82 RECEIVED INTRAVENOUS TISSUE PLASMINOGEN ACTIVATOR (TPA) IN EMERGENCY DEPARTMENT: Status: ACTIVE | Noted: 2021-02-20

## 2021-02-20 PROBLEM — J90 PLEURAL EFFUSION, BILATERAL: Status: ACTIVE | Noted: 2021-02-20

## 2021-02-20 PROBLEM — R93.89 ABNORMAL CHEST CT: Status: ACTIVE | Noted: 2021-02-20

## 2021-02-20 PROBLEM — K50.90 CROHN DISEASE (HCC): Chronic | Status: ACTIVE | Noted: 2021-02-20

## 2021-02-20 LAB
25(OH)D3 SERPL-MCNC: 13 NG/ML (ref 30–100)
ABO + RH BLD: NORMAL
ABO GROUP BLD: NORMAL
ALBUMIN SERPL BCP-MCNC: 3.3 G/DL (ref 3.2–4.9)
ALBUMIN/GLOB SERPL: 1.1 G/DL
ALP SERPL-CCNC: 90 U/L (ref 30–99)
ALT SERPL-CCNC: 41 U/L (ref 2–50)
AMPHET UR QL SCN: POSITIVE
ANION GAP SERPL CALC-SCNC: 12 MMOL/L (ref 7–16)
APPEARANCE UR: CLEAR
APTT PPP: 26.9 SEC (ref 24.7–36)
AST SERPL-CCNC: 28 U/L (ref 12–45)
BACTERIA #/AREA URNS HPF: ABNORMAL /HPF
BARBITURATES UR QL SCN: NEGATIVE
BASOPHILS # BLD AUTO: 0.6 % (ref 0–1.8)
BASOPHILS # BLD: 0.04 K/UL (ref 0–0.12)
BENZODIAZ UR QL SCN: NEGATIVE
BILIRUB SERPL-MCNC: 0.7 MG/DL (ref 0.1–1.5)
BILIRUB UR QL STRIP.AUTO: NEGATIVE
BLD GP AB SCN SERPL QL: NORMAL
BUN SERPL-MCNC: 18 MG/DL (ref 8–22)
BZE UR QL SCN: NEGATIVE
CALCIUM SERPL-MCNC: 8.6 MG/DL (ref 8.5–10.5)
CANNABINOIDS UR QL SCN: NEGATIVE
CHLORIDE SERPL-SCNC: 106 MMOL/L (ref 96–112)
CHOLEST SERPL-MCNC: 183 MG/DL (ref 100–199)
CO2 SERPL-SCNC: 21 MMOL/L (ref 20–33)
COLOR UR: YELLOW
CREAT SERPL-MCNC: 0.78 MG/DL (ref 0.5–1.4)
D DIMER PPP IA.FEU-MCNC: 1.23 UG/ML (FEU) (ref 0–0.5)
EOSINOPHIL # BLD AUTO: 0.03 K/UL (ref 0–0.51)
EOSINOPHIL NFR BLD: 0.4 % (ref 0–6.9)
EPI CELLS #/AREA URNS HPF: NEGATIVE /HPF
ERYTHROCYTE [DISTWIDTH] IN BLOOD BY AUTOMATED COUNT: 51.4 FL (ref 35.9–50)
FLUAV RNA SPEC QL NAA+PROBE: NEGATIVE
FLUBV RNA SPEC QL NAA+PROBE: NEGATIVE
GLOBULIN SER CALC-MCNC: 2.9 G/DL (ref 1.9–3.5)
GLUCOSE SERPL-MCNC: 122 MG/DL (ref 65–99)
GLUCOSE UR STRIP.AUTO-MCNC: NEGATIVE MG/DL
HCT VFR BLD AUTO: 38 % (ref 37–47)
HDLC SERPL-MCNC: 36 MG/DL
HGB BLD-MCNC: 11.8 G/DL (ref 12–16)
IMM GRANULOCYTES # BLD AUTO: 0.02 K/UL (ref 0–0.11)
IMM GRANULOCYTES NFR BLD AUTO: 0.3 % (ref 0–0.9)
INR PPP: 1.19 (ref 0.87–1.13)
KETONES UR STRIP.AUTO-MCNC: NEGATIVE MG/DL
LDLC SERPL CALC-MCNC: 124 MG/DL
LEUKOCYTE ESTERASE UR QL STRIP.AUTO: ABNORMAL
LYMPHOCYTES # BLD AUTO: 1.35 K/UL (ref 1–4.8)
LYMPHOCYTES NFR BLD: 19.2 % (ref 22–41)
MCH RBC QN AUTO: 30 PG (ref 27–33)
MCHC RBC AUTO-ENTMCNC: 31.1 G/DL (ref 33.6–35)
MCV RBC AUTO: 96.7 FL (ref 81.4–97.8)
METHADONE UR QL SCN: NEGATIVE
MICRO URNS: ABNORMAL
MONOCYTES # BLD AUTO: 0.57 K/UL (ref 0–0.85)
MONOCYTES NFR BLD AUTO: 8.1 % (ref 0–13.4)
NEUTROPHILS # BLD AUTO: 5.02 K/UL (ref 2–7.15)
NEUTROPHILS NFR BLD: 71.4 % (ref 44–72)
NITRITE UR QL STRIP.AUTO: POSITIVE
NRBC # BLD AUTO: 0 K/UL
NRBC BLD-RTO: 0 /100 WBC
OPIATES UR QL SCN: NEGATIVE
OXYCODONE UR QL SCN: NEGATIVE
PCP UR QL SCN: NEGATIVE
PH UR STRIP.AUTO: 6 [PH] (ref 5–8)
PLATELET # BLD AUTO: 443 K/UL (ref 164–446)
PMV BLD AUTO: 10.3 FL (ref 9–12.9)
POTASSIUM SERPL-SCNC: 4 MMOL/L (ref 3.6–5.5)
PROPOXYPH UR QL SCN: NEGATIVE
PROT SERPL-MCNC: 6.2 G/DL (ref 6–8.2)
PROT UR QL STRIP: 100 MG/DL
PROTHROMBIN TIME: 15.5 SEC (ref 12–14.6)
RBC # BLD AUTO: 3.93 M/UL (ref 4.2–5.4)
RBC # URNS HPF: ABNORMAL /HPF
RBC UR QL AUTO: NEGATIVE
RH BLD: NORMAL
RSV RNA SPEC QL NAA+PROBE: NEGATIVE
SARS-COV+SARS-COV-2 AG RESP QL IA.RAPID: NOTDETECTED
SARS-COV-2 RNA RESP QL NAA+PROBE: NOTDETECTED
SODIUM SERPL-SCNC: 139 MMOL/L (ref 135–145)
SP GR UR STRIP.AUTO: 1.02
SPECIMEN SOURCE: NORMAL
SPECIMEN SOURCE: NORMAL
TRIGL SERPL-MCNC: 113 MG/DL (ref 0–149)
TROPONIN T SERPL-MCNC: 26 NG/L (ref 6–19)
TSH SERPL DL<=0.005 MIU/L-ACNC: 1.88 UIU/ML (ref 0.38–5.33)
UROBILINOGEN UR STRIP.AUTO-MCNC: 0.2 MG/DL
WBC # BLD AUTO: 7 K/UL (ref 4.8–10.8)
WBC #/AREA URNS HPF: ABNORMAL /HPF

## 2021-02-20 PROCEDURE — 99291 CRITICAL CARE FIRST HOUR: CPT

## 2021-02-20 PROCEDURE — 70496 CT ANGIOGRAPHY HEAD: CPT

## 2021-02-20 PROCEDURE — 99153 MOD SED SAME PHYS/QHP EA: CPT

## 2021-02-20 PROCEDURE — 99223 1ST HOSP IP/OBS HIGH 75: CPT | Performed by: INTERNAL MEDICINE

## 2021-02-20 PROCEDURE — 80053 COMPREHEN METABOLIC PANEL: CPT

## 2021-02-20 PROCEDURE — 96374 THER/PROPH/DIAG INJ IV PUSH: CPT

## 2021-02-20 PROCEDURE — 99291 CRITICAL CARE FIRST HOUR: CPT | Performed by: INTERNAL MEDICINE

## 2021-02-20 PROCEDURE — 80307 DRUG TEST PRSMV CHEM ANLYZR: CPT

## 2021-02-20 PROCEDURE — 85025 COMPLETE CBC W/AUTO DIFF WBC: CPT

## 2021-02-20 PROCEDURE — 80061 LIPID PANEL: CPT

## 2021-02-20 PROCEDURE — 82306 VITAMIN D 25 HYDROXY: CPT

## 2021-02-20 PROCEDURE — 700105 HCHG RX REV CODE 258: Performed by: RADIOLOGY

## 2021-02-20 PROCEDURE — 700101 HCHG RX REV CODE 250: Performed by: RADIOLOGY

## 2021-02-20 PROCEDURE — 4410456 IR-THROMBECTOMY ARTERY SECONDARY

## 2021-02-20 PROCEDURE — 700111 HCHG RX REV CODE 636 W/ 250 OVERRIDE (IP): Performed by: PSYCHIATRY & NEUROLOGY

## 2021-02-20 PROCEDURE — 96375 TX/PRO/DX INJ NEW DRUG ADDON: CPT

## 2021-02-20 PROCEDURE — 70498 CT ANGIOGRAPHY NECK: CPT

## 2021-02-20 PROCEDURE — 86850 RBC ANTIBODY SCREEN: CPT

## 2021-02-20 PROCEDURE — 93005 ELECTROCARDIOGRAM TRACING: CPT | Performed by: EMERGENCY MEDICINE

## 2021-02-20 PROCEDURE — 700105 HCHG RX REV CODE 258: Performed by: PSYCHIATRY & NEUROLOGY

## 2021-02-20 PROCEDURE — 93306 TTE W/DOPPLER COMPLETE: CPT

## 2021-02-20 PROCEDURE — 700117 HCHG RX CONTRAST REV CODE 255: Performed by: EMERGENCY MEDICINE

## 2021-02-20 PROCEDURE — 03CG3ZZ EXTIRPATION OF MATTER FROM INTRACRANIAL ARTERY, PERCUTANEOUS APPROACH: ICD-10-PCS | Performed by: RADIOLOGY

## 2021-02-20 PROCEDURE — C9803 HOPD COVID-19 SPEC COLLECT: HCPCS | Performed by: EMERGENCY MEDICINE

## 2021-02-20 PROCEDURE — 0042T CT-CEREBRAL PERFUSION ANALYSIS: CPT

## 2021-02-20 PROCEDURE — 700111 HCHG RX REV CODE 636 W/ 250 OVERRIDE (IP)

## 2021-02-20 PROCEDURE — 85730 THROMBOPLASTIN TIME PARTIAL: CPT

## 2021-02-20 PROCEDURE — 3E03317 INTRODUCTION OF OTHER THROMBOLYTIC INTO PERIPHERAL VEIN, PERCUTANEOUS APPROACH: ICD-10-PCS | Performed by: EMERGENCY MEDICINE

## 2021-02-20 PROCEDURE — 99255 IP/OBS CONSLTJ NEW/EST HI 80: CPT | Performed by: PSYCHIATRY & NEUROLOGY

## 2021-02-20 PROCEDURE — 87426 SARSCOV CORONAVIRUS AG IA: CPT

## 2021-02-20 PROCEDURE — 770022 HCHG ROOM/CARE - ICU (200)

## 2021-02-20 PROCEDURE — 85610 PROTHROMBIN TIME: CPT

## 2021-02-20 PROCEDURE — 0241U HCHG SARS-COV-2 COVID-19 NFCT DS RESP RNA 4 TRGT MIC: CPT

## 2021-02-20 PROCEDURE — 84484 ASSAY OF TROPONIN QUANT: CPT

## 2021-02-20 PROCEDURE — 70450 CT HEAD/BRAIN W/O DYE: CPT

## 2021-02-20 PROCEDURE — 86901 BLOOD TYPING SEROLOGIC RH(D): CPT

## 2021-02-20 PROCEDURE — 700111 HCHG RX REV CODE 636 W/ 250 OVERRIDE (IP): Performed by: RADIOLOGY

## 2021-02-20 PROCEDURE — 81001 URINALYSIS AUTO W/SCOPE: CPT

## 2021-02-20 PROCEDURE — 85379 FIBRIN DEGRADATION QUANT: CPT

## 2021-02-20 PROCEDURE — 700117 HCHG RX CONTRAST REV CODE 255: Performed by: RADIOLOGY

## 2021-02-20 PROCEDURE — 84443 ASSAY THYROID STIM HORMONE: CPT

## 2021-02-20 PROCEDURE — 86900 BLOOD TYPING SEROLOGIC ABO: CPT

## 2021-02-20 RX ORDER — POLYETHYLENE GLYCOL 3350 17 G/17G
1 POWDER, FOR SOLUTION ORAL
Status: DISCONTINUED | OUTPATIENT
Start: 2021-02-20 | End: 2021-02-25 | Stop reason: HOSPADM

## 2021-02-20 RX ORDER — ATORVASTATIN CALCIUM 80 MG/1
80 TABLET, FILM COATED ORAL EVERY EVENING
Status: DISCONTINUED | OUTPATIENT
Start: 2021-02-20 | End: 2021-02-25 | Stop reason: HOSPADM

## 2021-02-20 RX ORDER — AMOXICILLIN 250 MG
2 CAPSULE ORAL 2 TIMES DAILY
Status: DISCONTINUED | OUTPATIENT
Start: 2021-02-20 | End: 2021-02-25 | Stop reason: HOSPADM

## 2021-02-20 RX ORDER — BISACODYL 10 MG
10 SUPPOSITORY, RECTAL RECTAL
Status: DISCONTINUED | OUTPATIENT
Start: 2021-02-20 | End: 2021-02-25 | Stop reason: HOSPADM

## 2021-02-20 RX ORDER — MIDAZOLAM HYDROCHLORIDE 1 MG/ML
INJECTION INTRAMUSCULAR; INTRAVENOUS
Status: DISPENSED
Start: 2021-02-20 | End: 2021-02-21

## 2021-02-20 RX ORDER — SODIUM CHLORIDE 9 MG/ML
50 INJECTION, SOLUTION INTRAVENOUS ONCE
Status: COMPLETED | OUTPATIENT
Start: 2021-02-20 | End: 2021-02-20

## 2021-02-20 RX ORDER — SODIUM CHLORIDE 9 MG/ML
500 INJECTION, SOLUTION INTRAVENOUS
Status: ACTIVE | OUTPATIENT
Start: 2021-02-20 | End: 2021-02-20

## 2021-02-20 RX ORDER — HYDRALAZINE HYDROCHLORIDE 20 MG/ML
INJECTION INTRAMUSCULAR; INTRAVENOUS
Status: COMPLETED
Start: 2021-02-20 | End: 2021-02-20

## 2021-02-20 RX ORDER — HYDRALAZINE HYDROCHLORIDE 20 MG/ML
10 INJECTION INTRAMUSCULAR; INTRAVENOUS ONCE
Status: COMPLETED | OUTPATIENT
Start: 2021-02-20 | End: 2021-02-20

## 2021-02-20 RX ORDER — MIDAZOLAM HYDROCHLORIDE 1 MG/ML
.5-2 INJECTION INTRAMUSCULAR; INTRAVENOUS PRN
Status: ACTIVE | OUTPATIENT
Start: 2021-02-20 | End: 2021-02-20

## 2021-02-20 RX ADMIN — MIDAZOLAM HYDROCHLORIDE 1 MG: 1 INJECTION, SOLUTION INTRAMUSCULAR; INTRAVENOUS at 17:02

## 2021-02-20 RX ADMIN — FENTANYL CITRATE 50 MCG: 50 INJECTION, SOLUTION INTRAMUSCULAR; INTRAVENOUS at 16:58

## 2021-02-20 RX ADMIN — HYDRALAZINE HYDROCHLORIDE 10 MG: 20 INJECTION INTRAMUSCULAR; INTRAVENOUS at 17:06

## 2021-02-20 RX ADMIN — ALTEPLASE 43.4 MG: KIT at 16:17

## 2021-02-20 RX ADMIN — IOHEXOL 50 ML: 300 INJECTION, SOLUTION INTRAVENOUS at 17:00

## 2021-02-20 RX ADMIN — MIDAZOLAM HYDROCHLORIDE 1 MG: 1 INJECTION, SOLUTION INTRAMUSCULAR; INTRAVENOUS at 17:00

## 2021-02-20 RX ADMIN — SODIUM CHLORIDE 50 ML: 9 INJECTION, SOLUTION INTRAVENOUS at 17:09

## 2021-02-20 RX ADMIN — IOHEXOL 40 ML: 350 INJECTION, SOLUTION INTRAVENOUS at 16:01

## 2021-02-20 RX ADMIN — SODIUM CHLORIDE 5 MG/HR: 9 INJECTION, SOLUTION INTRAVENOUS at 19:59

## 2021-02-20 RX ADMIN — IOHEXOL 80 ML: 350 INJECTION, SOLUTION INTRAVENOUS at 16:02

## 2021-02-20 ASSESSMENT — FIBROSIS 4 INDEX
FIB4 SCORE: 1.28
FIB4 SCORE: 0.61

## 2021-02-20 ASSESSMENT — PAIN DESCRIPTION - PAIN TYPE: TYPE: ACUTE PAIN
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
## 2021-02-20 NOTE — ED PROVIDER NOTES
ED Provider Note    Scribed for Radha Andrews M.D. by Bill Lauren. 2/20/2021  3:59 PM    Primary care provider: Pcp Pt States None  Means of arrival: EMS  History obtained from: Patient/EMS  History limited by: Patient is non-verbal    CHIEF COMPLAINT  stroke      HPI  Imani Stephen is a 60 y.o. female with a history of Chron's disease who presents to the Emergency Department as a Stroke Activation brought in by EMS for acute aphasia and right sided weakness last known normal approximately 30 minutes prior to arrival. EMS were called by the patient's  after he found the patient down and aphasic. She additionally presents with right sided weakness to her upper and lower extremities. Patient was seen her on the 11th where she refused COVID testing and ultimately left AMA.     HPI limited secondary to patient's complete aphasia.     REVIEW OF SYSTEMS  Neuro: Aphasia, right sided weakness.     See history of present illness.     ROS limited secondary to patient's complete aphasia.     PAST MEDICAL HISTORY   has a past medical history of Allergy, Arthritis, Depression (3/17/2010), Headache(784.0), Hyperlipidemia, Other and unspecified hyperlipidemia (4/20/2010), Renal cell carcinoma (10/21/2011), Renal mass, Unilateral paralysis due to acute cerebrovascular accident (CVA) (HCC) (2/20/2021), and Unspecified vitamin D deficiency (4/20/2010).    SURGICAL HISTORY   has a past surgical history that includes other abdominal surgery; primary c section; abdominal exploration (1975); tubal coagulation laparoscopic bilateral; and recovery (10/4/2012).    SOCIAL HISTORY  Social History     Tobacco Use    Smoking status: Current Every Day Smoker     Packs/day: 0.50     Years: 35.00     Pack years: 17.50     Types: Cigarettes    Smokeless tobacco: Never Used   Substance Use Topics    Alcohol use: No    Drug use: No      Social History     Substance and Sexual Activity   Drug Use No       FAMILY HISTORY  Family History    Problem Relation Age of Onset    Hypertension Mother     Hypertension Father     Heart Disease Sister     Cancer Sister     Other Daughter         Seizure    Lung Disease Neg Hx     Diabetes Neg Hx     Stroke Neg Hx        CURRENT MEDICATIONS  Current Outpatient Medications   Medication Instructions    cyclobenzaprine (FLEXERIL) 10 mg, Oral, 3 TIMES DAILY PRN    ondansetron (ZOFRAN ODT) 4 mg, Oral, EVERY 6 HOURS PRN         ALLERGIES  Allergies   Allergen Reactions    Codeine Itching and Vomiting    Pcn [Penicillins] Itching     Tolerates cephalosporins       PHYSICAL EXAM  VITAL SIGNS: /80   Pulse (!) 113   Resp 19   Wt 53.6 kg (118 lb 2.7 oz)   SpO2 100%   BMI 21.61 kg/m²     Constitutional: Non-verbal.   HEENT: Normocephalic, Atraumatic,  external ears normal, pharynx pink,  Mucous  Membranes moist, No rhinorrhea or mucosal edema  Eyes: PERRL, Conjunctiva normal, No discharge.   Neck: Normal range of motion, Supple, No stridor.   Lymphatic: No lymphadenopathy    Cardiovascular: Tachycardic. Regular Rhythm, No murmurs,  rubs, or gallops.   Thorax & Lungs: Tachypneic with rhonchous breath sounds on 4L , No wheezes, rhales, No chest wall tenderness.   Abdomen: Bowel sounds normal, Soft, non tender, non distended,  No pulsatile masses., no rebound guarding or peritoneal signs.   Skin: Warm, Dry, No erythema, No rash,   Back:  No CVA tenderness,  No bony crepitance, step offs, or instability.   Neurologic: Right sided inattention, right arm weakness, right leg weakness, completely mute with expressive and responsive aphasia. Does not respond to sensation on the right side. Does not follow commands. NIH 23.   Extremities: No mottling. Equal, intact distal pulses, No cyanosis, clubbing or edema,  No tenderness.   Musculoskeletal: No major deformities noted.       DIAGNOSTIC STUDIES / PROCEDURES    LABS  Results for orders placed or performed during the hospital encounter of 02/20/21   CBC WITH DIFFERENTIAL    Result Value Ref Range    WBC 7.0 4.8 - 10.8 K/uL    RBC 3.93 (L) 4.20 - 5.40 M/uL    Hemoglobin 11.8 (L) 12.0 - 16.0 g/dL    Hematocrit 38.0 37.0 - 47.0 %    MCV 96.7 81.4 - 97.8 fL    MCH 30.0 27.0 - 33.0 pg    MCHC 31.1 (L) 33.6 - 35.0 g/dL    RDW 51.4 (H) 35.9 - 50.0 fL    Platelet Count 443 164 - 446 K/uL    MPV 10.3 9.0 - 12.9 fL    Neutrophils-Polys 71.40 44.00 - 72.00 %    Lymphocytes 19.20 (L) 22.00 - 41.00 %    Monocytes 8.10 0.00 - 13.40 %    Eosinophils 0.40 0.00 - 6.90 %    Basophils 0.60 0.00 - 1.80 %    Immature Granulocytes 0.30 0.00 - 0.90 %    Nucleated RBC 0.00 /100 WBC    Neutrophils (Absolute) 5.02 2.00 - 7.15 K/uL    Lymphs (Absolute) 1.35 1.00 - 4.80 K/uL    Monos (Absolute) 0.57 0.00 - 0.85 K/uL    Eos (Absolute) 0.03 0.00 - 0.51 K/uL    Baso (Absolute) 0.04 0.00 - 0.12 K/uL    Immature Granulocytes (abs) 0.02 0.00 - 0.11 K/uL    NRBC (Absolute) 0.00 K/uL   COMP METABOLIC PANEL   Result Value Ref Range    Sodium 139 135 - 145 mmol/L    Potassium 4.0 3.6 - 5.5 mmol/L    Chloride 106 96 - 112 mmol/L    Co2 21 20 - 33 mmol/L    Anion Gap 12.0 7.0 - 16.0    Glucose 122 (H) 65 - 99 mg/dL    Bun 18 8 - 22 mg/dL    Creatinine 0.78 0.50 - 1.40 mg/dL    Calcium 8.6 8.5 - 10.5 mg/dL    AST(SGOT) 28 12 - 45 U/L    ALT(SGPT) 41 2 - 50 U/L    Alkaline Phosphatase 90 30 - 99 U/L    Total Bilirubin 0.7 0.1 - 1.5 mg/dL    Albumin 3.3 3.2 - 4.9 g/dL    Total Protein 6.2 6.0 - 8.2 g/dL    Globulin 2.9 1.9 - 3.5 g/dL    A-G Ratio 1.1 g/dL   PROTHROMBIN TIME   Result Value Ref Range    PT 15.5 (H) 12.0 - 14.6 sec    INR 1.19 (H) 0.87 - 1.13   APTT   Result Value Ref Range    APTT 26.9 24.7 - 36.0 sec   COD (ADULT)   Result Value Ref Range    ABO Grouping Only A     Rh Grouping Only POS     Antibody Screen-Cod NEG    TROPONIN   Result Value Ref Range    Troponin T 26 (H) 6 - 19 ng/L   URINE DRUG SCREEN   Result Value Ref Range    Amphetamines Urine Positive (A) Negative    Barbiturates Negative Negative     Benzodiazepines Negative Negative    Cocaine Metabolite Negative Negative    Methadone Negative Negative    Opiates Negative Negative    Oxycodone Negative Negative    Phencyclidine -Pcp Negative Negative    Propoxyphene Negative Negative    Cannabinoid Metab Negative Negative   COV-2, FLU A/B, AND RSV BY PCR (2-4 HOURS CEPHEID): Collect NP swab in VTM    Specimen: Nasopharyngeal; Respirate   Result Value Ref Range    Influenza virus A RNA Negative Negative    Influenza virus B, PCR Negative Negative    RSV, PCR Negative Negative    SARS-CoV-2 by PCR NotDetected     SARS-CoV-2 Source NP Swab    D-DIMER   Result Value Ref Range    D-Dimer Screen 1.23 (H) 0.00 - 0.50 ug/mL (FEU)   URINALYSIS    Specimen: Urine   Result Value Ref Range    Color Yellow     Character Clear     Specific Gravity 1.025 <1.035    Ph 6.0 5.0 - 8.0    Glucose Negative Negative mg/dL    Ketones Negative Negative mg/dL    Protein 100 (A) Negative mg/dL    Bilirubin Negative Negative    Urobilinogen, Urine 0.2 Negative    Nitrite Positive (A) Negative    Leukocyte Esterase Small (A) Negative    Occult Blood Negative Negative    Micro Urine Req Microscopic    ESTIMATED GFR   Result Value Ref Range    GFR If African American >60 >60 mL/min/1.73 m 2    GFR If Non African American >60 >60 mL/min/1.73 m 2   URINE MICROSCOPIC (W/UA)   Result Value Ref Range    -150 (A) /hpf    RBC 0-2 /hpf    Bacteria Many (A) None /hpf    Epithelial Cells Negative /hpf   Lipid Panel   Result Value Ref Range    Cholesterol,Tot 183 100 - 199 mg/dL    Triglycerides 113 0 - 149 mg/dL    HDL 36 (A) >=40 mg/dL     (H) <100 mg/dL   VITAMIN D,25 HYDROXY   Result Value Ref Range    25-Hydroxy   Vitamin D 25 13 (L) 30 - 100 ng/mL   TSH WITH REFLEX TO FT4   Result Value Ref Range    TSH 1.880 0.380 - 5.330 uIU/mL   ABO Rh Confirm   Result Value Ref Range    ABO Rh Confirm A POS    SARS-COV Antigen AUGUST   Result Value Ref Range    SARS-CoV-2 Source Nasal Swab      SARS-COV ANTIGEN AUGUST NotDetected Not-Detected     All labs reviewed by me.        RADIOLOGY  EC-ECHOCARDIOGRAM COMPLETE W/O CONT         CT-CEREBRAL PERFUSION ANALYSIS   Final Result      1.  Cerebral blood flow less than 30% likely representing completed infarct = 0 mL.      2.  T Max more than 6 seconds likely representing combination of completed infarct and ischemia = 142 mL.      3.  Mismatched volume likely representing ischemic brain/penumbra = 142 mL      4.  Please note that the cerebral perfusion was performed on the limited brain tissue around the basal ganglia region. Infarct/ischemia outside the CT perfusion sections can be missed in this study.      CT-CTA NECK WITH & W/O-POST PROCESSING   Final Result      1.  Atherosclerotic disease as described. Resulting stenosis is less than 50%.      2.  Bilateral pleural effusions layer posteriorly. Adjacent airspace disease likely atelectasis.      CT-CTA HEAD WITH & W/O-POST PROCESS   Final Result      Occlusion of the left M1 artery.      BRITTANY YE was aware of this abnormality at 4:15 PM on 2/20/2021.      CT-HEAD W/O   Final Result      1.  No acute intracranial findings.      2.  Mild diffuse atrophy. Periventricular white matter changes consistent with chronic small vessel disease.         IR-THROMBECTOMY ARTERY SECONDARY    (Results Pending)   CT-HEAD W/O    (Results Pending)     The radiologist's interpretation of all radiological studies have been reviewed by me.    COURSE & MEDICAL DECISION MAKING  Nursing notes, VS, PMSFHx reviewed in chart.    3:59 PM Patient seen and examined at charge test as a Stroke Activation. Full Stroke evaluation ordered. She was escorted to CT at this time. Neuro and Pharmacy aware. The differential diagnoses include but are not limited to: ICH, CVA, large vessel occlusion, more likely TIA, less likely seizure.    4:10 PM Patient with a large M1 occlusion on the left. Plan for TPA and IR. Intensivist and Hospitalist  paged.     4:13 PM I discussed the patient's case and the above findings with Dr. Lau (IR) who agrees to take the patient to IR after TPA.    4:15 PM TPA bolus given.     4:22 PM I discussed the patient's case and the above findings with Dr. Burnett (Hospitalist) who agrees to consult on admission.     4:28 PM I discussed the patient's case and the above findings with Dr. Salazar (Intensivist) who agrees to consult on admission.      4:52 PM Patient taken to IR.    DISPOSITION:  Patient will be admitted to ICU after IR in serious condition.    CRITICAL CARE  The very real possibility of a deterioration of this patient's condition required the highest level of my preparedness for sudden, emergent intervention.  I provided critical care services, which included medication orders, frequent reevaluations of the patient's condition and response to treatment, ordering and reviewing test results, and discussing the case with various consultants.  The critical care time associated with the care of the patient was 40 minutes. Review chart for interventions. This time is exclusive of any other billable procedures.     FINAL IMPRESSION  1. Cerebrovascular accident (CVA) due to occlusion of left middle cerebral artery (HCC)    2. Unilateral paralysis due to acute cerebrovascular accident (CVA) (HCC)    The critical care time associated with the care of the patient was 40 minutes.      Bill PALACIOS (Chris), am scribing for, and in the presence of, aRdha Andrews M.D..    Electronically signed by: Bill Lauren (Chris), 2/20/2021    Radha PALACIOS M.D. personally performed the services described in this documentation, as scribed by Bill Lauren in my presence, and it is both accurate and complete.    The note accurately reflects work and decisions made by me.  Radha Andrews M.D.  2/20/2021  10:36 PM

## 2021-02-21 ENCOUNTER — APPOINTMENT (OUTPATIENT)
Dept: RADIOLOGY | Facility: MEDICAL CENTER | Age: 61
DRG: 024 | End: 2021-02-21
Attending: NURSE PRACTITIONER
Payer: COMMERCIAL

## 2021-02-21 PROBLEM — N30.00 ACUTE CYSTITIS WITHOUT HEMATURIA: Status: ACTIVE | Noted: 2021-02-21

## 2021-02-21 PROBLEM — F15.10 METHAMPHETAMINE ABUSE (HCC): Status: ACTIVE | Noted: 2021-02-21

## 2021-02-21 PROBLEM — R93.1 ABNORMAL ECHOCARDIOGRAM: Status: ACTIVE | Noted: 2021-02-21

## 2021-02-21 PROBLEM — F17.200 TOBACCO DEPENDENCE: Status: ACTIVE | Noted: 2021-02-21

## 2021-02-21 LAB
LV EJECT FRACT  99904: 25
LV EJECT FRACT MOD 2C 99903: 33.92
LV EJECT FRACT MOD 4C 99902: 48.02
LV EJECT FRACT MOD BP 99901: 42.16

## 2021-02-21 PROCEDURE — 700102 HCHG RX REV CODE 250 W/ 637 OVERRIDE(OP): Performed by: INTERNAL MEDICINE

## 2021-02-21 PROCEDURE — 70551 MRI BRAIN STEM W/O DYE: CPT

## 2021-02-21 PROCEDURE — 700102 HCHG RX REV CODE 250 W/ 637 OVERRIDE(OP): Performed by: NURSE PRACTITIONER

## 2021-02-21 PROCEDURE — 99291 CRITICAL CARE FIRST HOUR: CPT | Performed by: NURSE PRACTITIONER

## 2021-02-21 PROCEDURE — 74018 RADEX ABDOMEN 1 VIEW: CPT

## 2021-02-21 PROCEDURE — A9270 NON-COVERED ITEM OR SERVICE: HCPCS | Performed by: INTERNAL MEDICINE

## 2021-02-21 PROCEDURE — A9270 NON-COVERED ITEM OR SERVICE: HCPCS | Performed by: NURSE PRACTITIONER

## 2021-02-21 PROCEDURE — 92610 EVALUATE SWALLOWING FUNCTION: CPT

## 2021-02-21 PROCEDURE — 99232 SBSQ HOSP IP/OBS MODERATE 35: CPT | Performed by: NURSE PRACTITIONER

## 2021-02-21 PROCEDURE — 93306 TTE W/DOPPLER COMPLETE: CPT | Mod: 26 | Performed by: INTERNAL MEDICINE

## 2021-02-21 PROCEDURE — 71045 X-RAY EXAM CHEST 1 VIEW: CPT

## 2021-02-21 PROCEDURE — 770022 HCHG ROOM/CARE - ICU (200)

## 2021-02-21 RX ORDER — ASPIRIN 81 MG/1
324 TABLET, CHEWABLE ORAL DAILY
Status: DISCONTINUED | OUTPATIENT
Start: 2021-02-21 | End: 2021-02-23

## 2021-02-21 RX ORDER — ASPIRIN 325 MG
325 TABLET ORAL DAILY
Status: DISCONTINUED | OUTPATIENT
Start: 2021-02-21 | End: 2021-02-23

## 2021-02-21 RX ORDER — ASPIRIN 300 MG/1
300 SUPPOSITORY RECTAL DAILY
Status: DISCONTINUED | OUTPATIENT
Start: 2021-02-21 | End: 2021-02-23

## 2021-02-21 RX ADMIN — ATORVASTATIN CALCIUM 80 MG: 80 TABLET, FILM COATED ORAL at 18:48

## 2021-02-21 RX ADMIN — ASPIRIN 324 MG: 81 TABLET, CHEWABLE ORAL at 19:56

## 2021-02-21 ASSESSMENT — PATIENT HEALTH QUESTIONNAIRE - PHQ9
2. FEELING DOWN, DEPRESSED, IRRITABLE, OR HOPELESS: NOT AT ALL
1. LITTLE INTEREST OR PLEASURE IN DOING THINGS: NOT AT ALL
SUM OF ALL RESPONSES TO PHQ9 QUESTIONS 1 AND 2: 0

## 2021-02-21 ASSESSMENT — PAIN DESCRIPTION - PAIN TYPE: TYPE: ACUTE PAIN
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
## 2021-02-21 NOTE — PROGRESS NOTES
Bedside report and neurocheck completed with DAYDAY Reza. Patient to R106 from IR via hospital bed on monitor with ACLS RN and CCT.     2 RN skin check complete with DAYDAY Kovacs  Devices in place SPO2 monitor, BP cuff, EKG leads.  Skin assessed under devices every 2 hours.  Heels are red, flaky and blanching. Sacrum is red and blanching.

## 2021-02-21 NOTE — PROGRESS NOTES
Patient's boyfriend Joni called unit for update on pt. As per pt's approval, Joni was updated of pt being in the ICU post stroke with TPA and Thrombectomy. Made aware that pt's BP is slightly high but that she is otherwise stable. Aware of neuro deficits at this time. Joni asking writer when he can come visit. Visitor policy explained in extensive detail. Emergency contact info updated and Joni listed as pt visitor. Joni thanked writer and understandable with above info.

## 2021-02-21 NOTE — OR SURGEON
Immediate Post- Operative Note        PostOp Diagnosis:Left m1 occlusion      Procedure(s): Mechanical thrombectomy     TICI 2 b flow       Estimated Blood Loss: Less than 5 ml        Complications: None            2/20/2021     5:12 PM     Kris Lau M.D.

## 2021-02-21 NOTE — ASSESSMENT & PLAN NOTE
-Imaging showed occlusion of the left M1 artery.  She received TPA without improvement  -Status post mechanical thrombectomy with removal of clots.  Postprocedure TICI 2b  -Goal SBP <160  -NIH 12  -Nicardipine drip as needed.  Currently not requiring  -As needed IV antihypertensives with parameters  -PT OT SLP  -Neurology following  -Statin  -Repeat imaging pending  -Sinew telemetry monitoring.  No events of atrial arrhythmias noted

## 2021-02-21 NOTE — ASSESSMENT & PLAN NOTE
-Previously seen in the ED 2/11/2021 for nausea/vomiting/diarrhea.  CT chest/abdomen/pelvis at that time showed heterogeneous liver with decreased attenuation and edema, extensive solid infiltrate mass from lower pole right kidney measuring 14 mm, small bilateral pleural effusions and cardiomegaly.  Work-up was incomplete as patient left AGAINST MEDICAL ADVICE

## 2021-02-21 NOTE — ASSESSMENT & PLAN NOTE
Status post alteplase and thrombectomy left M1 with  TICI 2 b  Continue telemetry monitoring   Apixaban 5mg BID  Neurology recommended cardiology eval for loop recorder Dr. Stein contacted recommended continue telemetry monitoring as inpatient and Holter monitor on discharge  PT/OT/SLP  Counseled on methamphetamine cessation and smoking cessation

## 2021-02-21 NOTE — ASSESSMENT & PLAN NOTE
-Urine drug screen positive for methamphetamines this hospitalization  -Echo with reduced EF 25%  -Now with acute stroke

## 2021-02-21 NOTE — ED NOTES
Pt from CT to room red #1. Pt unable to speak. RUE spastic. Hands bi-lat appear mottled. Pt has bruising to rt forearm area. Pt eyes are open. Pt withdraws to pain. Pt has left sided deviation. Pt unable to follow commands. Pt has spastic motion in all extremities. Neurology et pharmacy @ bedside prepping TPA @ this time.

## 2021-02-21 NOTE — ASSESSMENT & PLAN NOTE
-UA (+) nitrites, small leuk esterase, 100+ WBC's, (+) bacteria  -asymptomatic, afebrile, no leukocytosis  -hold off on ABX therapy

## 2021-02-21 NOTE — CONSULTS
Critical Care Consultation    Date of consult: 2/20/2021    Referring Physician  Naun Burnett M.D.    Reason for Consultation  CVA    History of Presenting Illness  60 y.o. female who presented 2/20/2021 with R sided weakness and inability to speak.  CT was performed and showed L MCA occlusion.  Pt was a candidate for tPA which was given w/o improvement, so pt went to IR for intervention.    Code Status  Full Code    Review of Systems  Review of Systems   Unable to perform ROS: Medical condition       Past Medical History   has a past medical history of Allergy, Arthritis, Depression (3/17/2010), Headache(784.0), Hyperlipidemia, Other and unspecified hyperlipidemia (4/20/2010), Renal cell carcinoma (10/21/2011), Renal mass, Unilateral paralysis due to acute cerebrovascular accident (CVA) (HCC) (2/20/2021), and Unspecified vitamin D deficiency (4/20/2010). She also has no past medical history of ASTHMA, Blood transfusion, CATARACT, CHF (congestive heart failure) (HCC), Diabetes, EMPHYSEMA, Glaucoma, Heart attack (HCC), Heart murmur, HIV (human immunodeficiency virus infection), Hypertension, IBD (inflammatory bowel disease), OSTEOPOROSIS, Seizure (HCC), Thyroid disease, Tuberculosis, or Ulcer.    Surgical History   has a past surgical history that includes other abdominal surgery; primary c section; abdominal exploration (1975); tubal coagulation laparoscopic bilateral; and recovery (10/4/2012).    Family History  family history includes Cancer in her sister; Heart Disease in her sister; Hypertension in her father and mother; Other in her daughter.    Social History   reports that she has been smoking cigarettes. She has a 17.50 pack-year smoking history. She has never used smokeless tobacco. She reports that she does not drink alcohol and does not use drugs.    Medications  Home Medications     Reviewed by Giovanna Saul (Pharmacy Tech) on 02/20/21 at 1638  Med List Status: Complete   Medication Last Dose  Status   cyclobenzaprine (FLEXERIL) 10 mg Tab Not Taking Active   ondansetron (ZOFRAN ODT) 4 MG TABLET DISPERSIBLE Not Taking Active              Current Facility-Administered Medications   Medication Dose Route Frequency Provider Last Rate Last Admin   • senna-docusate (PERICOLACE or SENOKOT S) 8.6-50 MG per tablet 2 tablet  2 tablet Oral BID Naun Burnett M.D.        And   • polyethylene glycol/lytes (MIRALAX) PACKET 1 Packet  1 Packet Oral QDAY PRN Naun Burnett M.D.        And   • magnesium hydroxide (MILK OF MAGNESIA) suspension 30 mL  30 mL Oral QDAY PRN Naun Burnett M.D.        And   • bisacodyl (DULCOLAX) suppository 10 mg  10 mg Rectal QDAY PRN Naun Burnett M.D.       • atorvastatin (LIPITOR) tablet 80 mg  80 mg Oral Q EVENING Naun Burnett M.D.       • NS (BOLUS) infusion 500 mL  500 mL Intravenous Once PRN Kris Lau M.D.       • fentaNYL (SUBLIMAZE) injection 12.5-50 mcg  12.5-50 mcg Intravenous PRN Kris Lau M.D.   50 mcg at 02/20/21 1658   • midazolam (VERSED) injection 0.5-2 mg  0.5-2 mg Intravenous PRN Kris Lau M.D.   1 mg at 02/20/21 1702   • HYDRALAZINE HCL 20 MG/ML INJ SOLN            • niCARdipine (CARDENE) 25 mg in  mL Infusion  0-15 mg/hr Intravenous Continuous Kris Lau M.D.           Allergies  Allergies   Allergen Reactions   • Codeine Itching and Vomiting   • Pcn [Penicillins] Itching     Tolerates cephalosporins       Vital Signs last 24 hours  Temp:  [36.4 °C (97.5 °F)] (P) 36.4 °C (97.5 °F)  Pulse:  [104-116] 107  Resp:  [16-22] 16  BP: (130-180)/() 152/89  SpO2:  [96 %-100 %] 96 %    Physical Exam  Physical Exam  Constitutional:       Appearance: She is ill-appearing.   HENT:      Head: Normocephalic and atraumatic.      Nose: Nose normal.      Mouth/Throat:      Mouth: Mucous membranes are dry.      Pharynx: Oropharynx is clear.   Eyes:      Pupils: Pupils are equal, round, and reactive to light.    Cardiovascular:      Rate and Rhythm: Regular rhythm. Tachycardia present.      Pulses: Normal pulses.      Heart sounds: Normal heart sounds.   Pulmonary:      Effort: Pulmonary effort is normal. No respiratory distress.      Breath sounds: Rhonchi present.   Abdominal:      General: Abdomen is flat. There is no distension.      Palpations: Abdomen is soft.      Tenderness: There is no abdominal tenderness. There is no guarding.   Musculoskeletal:      Cervical back: Normal range of motion.      Right lower leg: No edema.      Left lower leg: No edema.   Skin:     General: Skin is warm and dry.      Capillary Refill: Capillary refill takes less than 2 seconds.   Neurological:      Mental Status: She is alert.      Comments: CN 1 - Not tested  CN 2- intact to confrontation  CN 3, 4, & 6 - EOMI intact to left and down, did not look right or up.  CN 5 - Not able to follow commands  CN 7 - slight lip droop right, not able to stick out tongue, puff checks or close eyelids.  CN 8 - Hearing intact to conversation  CN 9, 10 - Not able to follow commands  CN 11 - Not able to follow commands     GCS - 11         Fluids  No intake or output data in the 24 hours ending 02/20/21 1804    Laboratory  Recent Results (from the past 48 hour(s))   SARS-COV Antigen AUGUST    Collection Time: 02/20/21 11:30 AM   Result Value Ref Range    SARS-CoV-2 Source Nasal Swab     SARS-COV ANTIGEN AUGUST NotDetected Not-Detected   CBC WITH DIFFERENTIAL    Collection Time: 02/20/21  3:51 PM   Result Value Ref Range    WBC 7.0 4.8 - 10.8 K/uL    RBC 3.93 (L) 4.20 - 5.40 M/uL    Hemoglobin 11.8 (L) 12.0 - 16.0 g/dL    Hematocrit 38.0 37.0 - 47.0 %    MCV 96.7 81.4 - 97.8 fL    MCH 30.0 27.0 - 33.0 pg    MCHC 31.1 (L) 33.6 - 35.0 g/dL    RDW 51.4 (H) 35.9 - 50.0 fL    Platelet Count 443 164 - 446 K/uL    MPV 10.3 9.0 - 12.9 fL    Neutrophils-Polys 71.40 44.00 - 72.00 %    Lymphocytes 19.20 (L) 22.00 - 41.00 %    Monocytes 8.10 0.00 - 13.40 %     Eosinophils 0.40 0.00 - 6.90 %    Basophils 0.60 0.00 - 1.80 %    Immature Granulocytes 0.30 0.00 - 0.90 %    Nucleated RBC 0.00 /100 WBC    Neutrophils (Absolute) 5.02 2.00 - 7.15 K/uL    Lymphs (Absolute) 1.35 1.00 - 4.80 K/uL    Monos (Absolute) 0.57 0.00 - 0.85 K/uL    Eos (Absolute) 0.03 0.00 - 0.51 K/uL    Baso (Absolute) 0.04 0.00 - 0.12 K/uL    Immature Granulocytes (abs) 0.02 0.00 - 0.11 K/uL    NRBC (Absolute) 0.00 K/uL   COMP METABOLIC PANEL    Collection Time: 02/20/21  3:51 PM   Result Value Ref Range    Sodium 139 135 - 145 mmol/L    Potassium 4.0 3.6 - 5.5 mmol/L    Chloride 106 96 - 112 mmol/L    Co2 21 20 - 33 mmol/L    Anion Gap 12.0 7.0 - 16.0    Glucose 122 (H) 65 - 99 mg/dL    Bun 18 8 - 22 mg/dL    Creatinine 0.78 0.50 - 1.40 mg/dL    Calcium 8.6 8.5 - 10.5 mg/dL    AST(SGOT) 28 12 - 45 U/L    ALT(SGPT) 41 2 - 50 U/L    Alkaline Phosphatase 90 30 - 99 U/L    Total Bilirubin 0.7 0.1 - 1.5 mg/dL    Albumin 3.3 3.2 - 4.9 g/dL    Total Protein 6.2 6.0 - 8.2 g/dL    Globulin 2.9 1.9 - 3.5 g/dL    A-G Ratio 1.1 g/dL   PROTHROMBIN TIME    Collection Time: 02/20/21  3:51 PM   Result Value Ref Range    PT 15.5 (H) 12.0 - 14.6 sec    INR 1.19 (H) 0.87 - 1.13   APTT    Collection Time: 02/20/21  3:51 PM   Result Value Ref Range    APTT 26.9 24.7 - 36.0 sec   COD (ADULT)    Collection Time: 02/20/21  3:51 PM   Result Value Ref Range    ABO Grouping Only A     Rh Grouping Only POS     Antibody Screen-Cod NEG    TROPONIN    Collection Time: 02/20/21  3:51 PM   Result Value Ref Range    Troponin T 26 (H) 6 - 19 ng/L   D-DIMER    Collection Time: 02/20/21  3:51 PM   Result Value Ref Range    D-Dimer Screen 1.23 (H) 0.00 - 0.50 ug/mL (FEU)   ESTIMATED GFR    Collection Time: 02/20/21  3:51 PM   Result Value Ref Range    GFR If African American >60 >60 mL/min/1.73 m 2    GFR If Non African American >60 >60 mL/min/1.73 m 2   Lipid Panel    Collection Time: 02/20/21  3:51 PM   Result Value Ref Range     Cholesterol,Tot 183 100 - 199 mg/dL    Triglycerides 113 0 - 149 mg/dL    HDL 36 (A) >=40 mg/dL     (H) <100 mg/dL   ABO Rh Confirm    Collection Time: 02/20/21  3:51 PM   Result Value Ref Range    ABO Rh Confirm A POS    URINE DRUG SCREEN    Collection Time: 02/20/21  4:20 PM   Result Value Ref Range    Amphetamines Urine Positive (A) Negative    Barbiturates Negative Negative    Benzodiazepines Negative Negative    Cocaine Metabolite Negative Negative    Methadone Negative Negative    Opiates Negative Negative    Oxycodone Negative Negative    Phencyclidine -Pcp Negative Negative    Propoxyphene Negative Negative    Cannabinoid Metab Negative Negative   URINALYSIS    Collection Time: 02/20/21  4:20 PM    Specimen: Urine   Result Value Ref Range    Color Yellow     Character Clear     Specific Gravity 1.025 <1.035    Ph 6.0 5.0 - 8.0    Glucose Negative Negative mg/dL    Ketones Negative Negative mg/dL    Protein 100 (A) Negative mg/dL    Bilirubin Negative Negative    Urobilinogen, Urine 0.2 Negative    Nitrite Positive (A) Negative    Leukocyte Esterase Small (A) Negative    Occult Blood Negative Negative    Micro Urine Req Microscopic    URINE MICROSCOPIC (W/UA)    Collection Time: 02/20/21  4:20 PM   Result Value Ref Range    -150 (A) /hpf    RBC 0-2 /hpf    Bacteria Many (A) None /hpf    Epithelial Cells Negative /hpf   COV-2, FLU A/B, AND RSV BY PCR (2-4 HOURS CEPHEID): Collect NP swab in VTM    Collection Time: 02/20/21  4:25 PM    Specimen: Nasopharyngeal; Respirate   Result Value Ref Range    Influenza virus A RNA Negative Negative    Influenza virus B, PCR Negative Negative    RSV, PCR Negative Negative    SARS-CoV-2 by PCR NotDetected     SARS-CoV-2 Source NP Swab        Imaging  CT-CEREBRAL PERFUSION ANALYSIS   Final Result      1.  Cerebral blood flow less than 30% likely representing completed infarct = 0 mL.      2.  T Max more than 6 seconds likely representing combination of completed  infarct and ischemia = 142 mL.      3.  Mismatched volume likely representing ischemic brain/penumbra = 142 mL      4.  Please note that the cerebral perfusion was performed on the limited brain tissue around the basal ganglia region. Infarct/ischemia outside the CT perfusion sections can be missed in this study.      CT-CTA NECK WITH & W/O-POST PROCESSING   Final Result      1.  Atherosclerotic disease as described. Resulting stenosis is less than 50%.      2.  Bilateral pleural effusions layer posteriorly. Adjacent airspace disease likely atelectasis.      CT-CTA HEAD WITH & W/O-POST PROCESS   Final Result      Occlusion of the left M1 artery.      BRITTANY YE was aware of this abnormality at 4:15 PM on 2/20/2021.      CT-HEAD W/O   Final Result      1.  No acute intracranial findings.      2.  Mild diffuse atrophy. Periventricular white matter changes consistent with chronic small vessel disease.         IR-THROMBECTOMY ARTERY SECONDARY    (Results Pending)   EC-ECHOCARDIOGRAM COMPLETE W/O CONT    (Results Pending)       Assessment/Plan  * CVA (cerebral vascular accident) (HCC)  Assessment & Plan  - Pt had CVA w/ occlusion of L M1 artery.  Pt had tPA w/o improvement  - Pt went for IR mechanical thrombectomy with removal of clot and Tiki 2  - Nicardipine gtt for BP mgt  - R leg straight s/p R femoral artery puncture  - CT Head for acute change of neuro status      Discussed patient condition and risk of morbidity and/or mortality with Hospitalist and RN.    The patient remains critically ill.  Critical care time = 51 minutes in directly providing and coordinating critical care and extensive data review.  No time overlap and excludes procedures.

## 2021-02-21 NOTE — PROGRESS NOTES
Critical Care Progress Note    Date of admission  2/20/2021    Chief Complaint  Weakness and speech difficulties    Hospital Course  Ms. Stephen is a 60-year-old female with PMH significant for methamphetamine abuse, tobacco dependence, Crohn's disease, chronic pain and depression who presented 2/20/2021 with right-sided weakness and difficulty speaking.  Per report, patient was found on bedroom floor and was brought to the ED via EMS.  Stroke code called for left MCA M1 thrombus.  She received TPA in the ED at 1615 and went to IR for thrombectomy with post procedure TICI 2B flow.  UDS positive amphetamines.  ECHO showed severely reduced LV function with EF 25%, mild MR, moderate MS with no evidence of right to left shunt.  No previous ECHO on file for comparison.  Goal SBP <160    Interval Problem Update  Reviewed last 24 hour events:              - acute events overnight: No acute events overnight.              - Tm: 98.3              - HR:  sinus rhythm/sinus tach              - SBP: 110s              - Neuro: Sleepy/lethargic, aphasia.  Follows commands              - GI: Tolerating puréed diet              - I/O: 200/3500 (-3300 since admit)              - Carter: None              - Lines: None              - PPx: Not applicable              - CXR (personally reviewed): No acute cardiopulmonary processes.  Bibasilar atelectasis.              - Antibiotic Day: Not applicable   - SAT/SBT: Not applicable   - Mobility: 1    Review of Systems  Review of Systems   Unable to perform ROS: Acuity of condition        Vital Signs for last 24 hours   Temp:  [36 °C (96.8 °F)-36.8 °C (98.3 °F)] 36.5 °C (97.7 °F)  Pulse:  [] 101  Resp:  [12-32] 20  BP: ()/() 138/66  SpO2:  [84 %-100 %] 96 %    Hemodynamic parameters for last 24 hours       Respiratory Information for the last 24 hours       Physical Exam   Physical Exam  Vitals and nursing note reviewed. Exam conducted with a chaperone present.  "  Constitutional:       General: She is sleeping.      Appearance: She is ill-appearing.      Comments: Thin/frail/unkempt   HENT:      Head: Normocephalic.      Right Ear: Decreased hearing noted.      Left Ear: Decreased hearing noted.      Nose: Nose normal.      Mouth/Throat:      Lips: Pink.      Mouth: Mucous membranes are moist.   Eyes:      General: Visual field deficit present.   Cardiovascular:      Rate and Rhythm: Normal rate.      Pulses: Decreased pulses.           Posterior tibial pulses are 1+ on the right side and 1+ on the left side.      Heart sounds: Normal heart sounds.   Pulmonary:      Effort: Pulmonary effort is normal.      Breath sounds: Normal breath sounds. No wheezing.   Abdominal:      General: Bowel sounds are normal.      Palpations: Abdomen is soft.      Tenderness: There is no abdominal tenderness.   Musculoskeletal:      Right lower leg: No edema.      Left lower leg: No edema.      Comments: Moves all extremities independently   Skin:     General: Skin is warm and dry.   Neurological:      Mental Status: She is lethargic.      Cranial Nerves: Cranial nerve deficit and dysarthria present.      Motor: Weakness, atrophy and abnormal muscle tone present.      Coordination: Coordination abnormal. Finger-Nose-Finger Test abnormal.      Comments: LEANDER 5/5  Aphasia - will say \"yes/ok\"  Follow commands    Unable to answer orientation questions  Obeys some commands  Cannot squeeze eyes shut or show teeth  Sensory appears intact           Psychiatric:         Speech: Speech is delayed.         Behavior: Behavior is cooperative.      Comments: Unable to fully assess cognition due to aphasia         Medications  Current Facility-Administered Medications   Medication Dose Route Frequency Provider Last Rate Last Admin   • aspirin (ASA) tablet 325 mg  325 mg Oral DAILY MILLY Valdez.P.R.N.        Or   • aspirin (ASA) chewable tab 324 mg  324 mg Oral DAILY Belkis Hardy, A.P.R.N.        Or   • " aspirin (ASA) suppository 300 mg  300 mg Rectal DAILY CRYSTAL Valdez.       • senna-docusate (PERICOLACE or SENOKOT S) 8.6-50 MG per tablet 2 tablet  2 tablet Oral BID Naun Burnett M.D.   Stopped at 02/20/21 1800    And   • polyethylene glycol/lytes (MIRALAX) PACKET 1 Packet  1 Packet Oral QDAY PRN Naun Burnett M.D.        And   • magnesium hydroxide (MILK OF MAGNESIA) suspension 30 mL  30 mL Oral QDAY PRN Naun Burnett M.D.        And   • bisacodyl (DULCOLAX) suppository 10 mg  10 mg Rectal QDAY PRN Naun Burnett M.D.       • atorvastatin (LIPITOR) tablet 80 mg  80 mg Oral Q EVENING Naun Burnett M.D.   Stopped at 02/20/21 1800   • niCARdipine (CARDENE) 25 mg in  mL Infusion  0-15 mg/hr Intravenous Continuous Kris Lau M.D.   Stopped at 02/21/21 0105       Fluids    Intake/Output Summary (Last 24 hours) at 2/21/2021 1321  Last data filed at 2/21/2021 1200  Gross per 24 hour   Intake 193.74 ml   Output 4000 ml   Net -3806.26 ml       Laboratory          Recent Labs     02/20/21  1551   SODIUM 139   POTASSIUM 4.0   CHLORIDE 106   CO2 21   BUN 18   CREATININE 0.78   CALCIUM 8.6     Recent Labs     02/20/21  1551   ALTSGPT 41   ASTSGOT 28   ALKPHOSPHAT 90   TBILIRUBIN 0.7   GLUCOSE 122*     Recent Labs     02/20/21  1551   WBC 7.0   NEUTSPOLYS 71.40   LYMPHOCYTES 19.20*   MONOCYTES 8.10   EOSINOPHILS 0.40   BASOPHILS 0.60   ASTSGOT 28   ALTSGPT 41   ALKPHOSPHAT 90   TBILIRUBIN 0.7     Recent Labs     02/20/21  1551   RBC 3.93*   HEMOGLOBIN 11.8*   HEMATOCRIT 38.0   PLATELETCT 443   PROTHROMBTM 15.5*   APTT 26.9   INR 1.19*       Imaging  X-Ray:  I have personally reviewed the images and compared with prior images.  EKG:  I have personally reviewed the images and compared with prior images.  CT:    Reviewed  Echo:   Reviewed    Assessment/Plan  * CVA (cerebral vascular accident) (HCC)- (present on admission)  Assessment & Plan  -Imaging showed occlusion of the left  M1 artery.  She received TPA without improvement  -Status post mechanical thrombectomy with removal of clots.  Postprocedure TICI 2b  -Goal SBP <160  -NIH 12  -Nicardipine drip as needed.  Currently not requiring  -As needed IV antihypertensives with parameters  -PT OT SLP  -Neurology following  -Statin  -Repeat imaging pending  -Sinew telemetry monitoring.  No events of atrial arrhythmias noted    Received intravenous tissue plasminogen activator (tPA) in emergency department  Assessment & Plan  -1617 on 2/20    Abnormal chest CT- (present on admission)  Assessment & Plan  -Previously seen in the ED 2/11/2021 for nausea/vomiting/diarrhea.  CT chest/abdomen/pelvis at that time showed heterogeneous liver with decreased attenuation and edema, extensive solid infiltrate mass from lower pole right kidney measuring 14 mm, small bilateral pleural effusions and cardiomegaly.  Work-up was incomplete as patient left AGAINST MEDICAL ADVICE    Pleural effusion, bilateral- (present on admission)  Assessment & Plan  -Noted on CT chest from previous hospital presentation 2/11/2021    Abnormal echocardiogram- (present on admission)  Assessment & Plan  -No previous ECHO on file  -History of methamphetamine abuse  -ECHO this hospitalization showing 25% EF  -Now with acute stroke -likely embolic  -No acute signs/symptoms of heart failure  -Consider starting ACE/ARB, beta-blocker and diuretic when taking p.o. and permissive hypertension completed  -Follow-up with cardiology    Acute cystitis without hematuria- (present on admission)  Assessment & Plan  -UA (+) nitrites, small leuk esterase, 100+ WBC's, (+) bacteria  -asymptomatic, afebrile, no leukocytosis  -hold off on ABX therapy     Tobacco dependence  Assessment & Plan  -cessation counseling    Methamphetamine abuse (HCC)- (present on admission)  Assessment & Plan  -Urine drug screen positive for methamphetamines this hospitalization  -Echo with reduced EF 25%  -Now with acute  stroke      Crohn disease (HCC)- (present on admission)  Assessment & Plan  -History of  -Not currently on medication    Depression- (present on admission)  Assessment & Plan  -History of  -Not currently on medications  -Stable, in remission       VTE:  Not Indicated  Ulcer: Not Indicated  Lines: None    I have performed a physical exam and reviewed and updated ROS and Plan today (2/21/2021). In review of yesterday's note (2/20/2021), there are no changes except as documented above.     Discussed patient condition and risk of morbidity and/or mortality with RN, RT, Pharmacy, Charge nurse / hot rounds and Patient  The patient remains critically ill.  Critical care time = 39 minutes in directly providing and coordinating critical care and extensive data review.  No time overlap and excludes procedures.    CRYSTAL Valdez.

## 2021-02-21 NOTE — H&P
Hospital Medicine History & Physical Note    Date of Service  2/20/2021    Primary Care Physician  Pcp Pt States None    Consultants  Neurology  Critical Care - Dr. Salazar  IR - Dr. Lau    Code Status  Full Code    Chief Complaint  Right sided weakness and difficulty speaking    History of Presenting Illness  60F PMH anxiety, depression, Vit D def, Crohn's disease, tobacco use admit 2/20/21 for acute left stroke, right sided weakness, dysphagia, right neglect.  As per ED note, patient was found on bedroom floor. Timing onset 3771-8163.  Patient arrived to ED shortly after.  Stroke code called for Left MCA M1 thrombus, patient received TPA in ED 1615. CT incidental findings of multiple bilateral small hypodense thyroid nodules and B/L pleural effusions. Was present 2/11/21, left AMA, came for abdominal pain, N/V.    Attempted to evaluate patients in the ER read 1, patient went straight to IR for embolectomy after TPA.  Patient admitted to critical care.  Spoke with ED physician about patient's case, critical care, IR and neurology were consulted.    Review of Systems  Review of Systems   Unable to perform ROS: Other   Patient in emergent procedure    Past Medical History   has a past medical history of Allergy, Arthritis, Depression (3/17/2010), Headache(784.0), Hyperlipidemia, Other and unspecified hyperlipidemia (4/20/2010), Renal cell carcinoma (10/21/2011), Renal mass, Unilateral paralysis due to acute cerebrovascular accident (CVA) (HCC) (2/20/2021), and Unspecified vitamin D deficiency (4/20/2010).    Surgical History   has a past surgical history that includes other abdominal surgery; primary c section; abdominal exploration (1975); tubal coagulation laparoscopic bilateral; and recovery (10/4/2012).     Family History  family history includes Cancer in her sister; Heart Disease in her sister; Hypertension in her father and mother; Other in her daughter.     Social History   reports that she has been  smoking cigarettes. She has a 17.50 pack-year smoking history. She has never used smokeless tobacco. She reports that she does not drink alcohol and does not use drugs.    Allergies  Allergies   Allergen Reactions   • Codeine Itching and Vomiting   • Pcn [Penicillins] Itching     Tolerates cephalosporins       Medications  Prior to Admission Medications   Prescriptions Last Dose Informant Patient Reported? Taking?   cyclobenzaprine (FLEXERIL) 10 mg Tab Not Taking at Unknown time Patient No No   Sig: Take 1 Tab by mouth 3 times a day as needed.   Patient not taking: Reported on 2/20/2021   ondansetron (ZOFRAN ODT) 4 MG TABLET DISPERSIBLE Not Taking at Unknown time Patient No No   Sig: Take 1 tablet by mouth every 6 hours as needed.   Patient not taking: Reported on 2/20/2021      Facility-Administered Medications: None       Physical Exam  Temp:  [36.4 °C (97.5 °F)] 36.4 °C (97.5 °F)  Pulse:  [101-116] 105  Resp:  [15-29] 29  BP: (130-209)/() 158/77  SpO2:  [96 %-100 %] 96 %    Physical Exam  Vitals and nursing note reviewed.     Unable to complete physical exam as patient went straight for emergency procedure    Laboratory:  Recent Labs     02/20/21  1551   WBC 7.0   RBC 3.93*   HEMOGLOBIN 11.8*   HEMATOCRIT 38.0   MCV 96.7   MCH 30.0   MCHC 31.1*   RDW 51.4*   PLATELETCT 443   MPV 10.3     Recent Labs     02/20/21  1551   SODIUM 139   POTASSIUM 4.0   CHLORIDE 106   CO2 21   GLUCOSE 122*   BUN 18   CREATININE 0.78   CALCIUM 8.6     Recent Labs     02/20/21  1551   ALTSGPT 41   ASTSGOT 28   ALKPHOSPHAT 90   TBILIRUBIN 0.7   GLUCOSE 122*     Recent Labs     02/20/21  1551   APTT 26.9   INR 1.19*     No results for input(s): NTPROBNP in the last 72 hours.  Recent Labs     02/20/21  1551   TRIGLYCERIDE 113   HDL 36*   *     Recent Labs     02/20/21  1551   TROPONINT 26*       Imaging:  CT-CEREBRAL PERFUSION ANALYSIS   Final Result      1.  Cerebral blood flow less than 30% likely representing completed  infarct = 0 mL.      2.  T Max more than 6 seconds likely representing combination of completed infarct and ischemia = 142 mL.      3.  Mismatched volume likely representing ischemic brain/penumbra = 142 mL      4.  Please note that the cerebral perfusion was performed on the limited brain tissue around the basal ganglia region. Infarct/ischemia outside the CT perfusion sections can be missed in this study.      CT-CTA NECK WITH & W/O-POST PROCESSING   Final Result      1.  Atherosclerotic disease as described. Resulting stenosis is less than 50%.      2.  Bilateral pleural effusions layer posteriorly. Adjacent airspace disease likely atelectasis.      CT-CTA HEAD WITH & W/O-POST PROCESS   Final Result      Occlusion of the left M1 artery.      BRITTANY EPHRAIM was aware of this abnormality at 4:15 PM on 2/20/2021.      CT-HEAD W/O   Final Result      1.  No acute intracranial findings.      2.  Mild diffuse atrophy. Periventricular white matter changes consistent with chronic small vessel disease.         IR-THROMBECTOMY ARTERY SECONDARY    (Results Pending)   EC-ECHOCARDIOGRAM COMPLETE W/O CONT    (Results Pending)       Assessment/Plan:  I anticipate this patient will require at least two midnights for appropriate medical management, necessitating inpatient admission.    * CVA (cerebral vascular accident) (HCC)- (present on admission)  Assessment & Plan  Patient's symptoms of right sided weakness, dysarthria, right sided neglect, onset around 1500 on day of admission.  Left MCA M1 territory thrombus    Stroke protocol ordered  TPA given in ED  Admit to ICU, EDP spoke with Dr. Salazar  Pending cerebral angiography  Keep NPO  Stroke workup to be completed, neurology following  PT/OT once stable  UDS    Received intravenous tissue plasminogen activator (tPA) in emergency department  Assessment & Plan  Post-TPA protocol initiated  Do not re-stick patient post TPA    Abnormal chest CT- (present on admission)  Assessment &  Plan  CT of the head and neck showing incidental pulmonary findings.  Thorough chest CT warranted once patient is stabilized from her acute stroke    Pleural effusion, bilateral- (present on admission)  Assessment & Plan  Patient CT showed incidental finding of bilateral pleural effusions  Patient was present on 2/11/2021 he did complain of shortness of breath, but left the ED AMA  Checking patient for COVID-19 infection  If patient has persistent SOB, will need to consider thoracentesis for diagnosis    Crohn disease (HCC)- (present on admission)  Assessment & Plan  Patient has known disease.    Vitamin D deficiency- (present on admission)  Assessment & Plan  Patient will need to be checked once labs are allowed to have blood draws    Depression- (present on admission)  Assessment & Plan  Pending home medication reconciliation    DVT Prophylaxis: SCDs, contraindication for chemoprophylaxis acute CVA.

## 2021-02-21 NOTE — ASSESSMENT & PLAN NOTE
-No previous ECHO on file  -History of methamphetamine abuse  -ECHO this hospitalization showing 25% EF  -Now with acute stroke -likely embolic  -No acute signs/symptoms of heart failure  -Consider starting ACE/ARB, beta-blocker and diuretic when taking p.o. and permissive hypertension completed  -Follow-up with cardiology

## 2021-02-21 NOTE — PROGRESS NOTES
IR RN note    Patient underwent a Mechanical thrombectomy  by Dr. Lau.  Procedure site was verified by MD using imaging guidance. Emergency Consent was signed.  IR mague Hitchcock and Diana assisted. Patient was placed in a supine position.  Vitals were taken every 5 minutes and remained stable during procedure (see doc flow sheet for results).  CO2 waveform capnography was monitored throughout procedure, see chart.  Right groin access site.   An Angio Seal, gauze and tegaderm dressing was placed over surgical site. Report given to Ludivina NAYLOR. Pt transported by ralfreda with RN to R106, with monitor .   Reviewed sedation orders with MD PACHECO procedure ended at 1708      AngioSeal 6F @ 1706  REF # [id number]  LOT # [id number]  Exp. 10-

## 2021-02-21 NOTE — ED NOTES
Med rec updated and complete. Allergies reviewed.  Pt was unable to participate in an interview.   Placed call to listed home pharmacy [facility]   Pt was given a prescription for Zofran on 02/11/21 but never picked it up.      Listed home pharmacy [facility].

## 2021-02-21 NOTE — PROGRESS NOTES
Status post mechanical thrombectomy of left M1 occlusion.    TICI 2b flow     Neuro interventional recommendations:    Please keep the systolic blood pressure less than 160.

## 2021-02-21 NOTE — THERAPY
"Speech Language Pathology   Clinical Swallow Evaluation     Patient Name: Imani Stephen  AGE:  60 y.o., SEX:  female  Medical Record #: [id number]  Today's Date: 2/21/2021     Precautions  Precautions: (P) Swallow Precautions ( See Comments)  Comments: (P) puree/mildly thick    Assessment  61 YO female admitted 2/20 2/2 acute left sided stroke, right sided weakness, R neglect and dysphagia. PMHx: crohn's disease, anxiety, depression. CMHx: CVA s/p TPA in ED, underwent thrombectomy 2/20, abnormal chest CT, plearal effusion, crohn's disease. No acute findings on head CT 2/20. Head CTA 2/20 \"Occlusion of the left M1 artery\".    Pt seen this date for clinical swallow evaluation 2/2 CVA s/p tPA and thrombectomy. Informal oral mech exam completed given absent verbal direction following. With visual models pt able to mimic clinician and demonstrated WNL lingual and labial strength and ROM. Pt is edentulous. PO trials of ice, MTL, liquidized, puree, soft and bite sized and thins assessed. Hyolaryngeal elevation palpated as complete, timely to 1-2 second delayed initiation of swallow appreciated and vocal quality remained clear throughout. Audible gulping appreciated in 50% of trials of MTL. Suspect inadequate mastication and pt swallowed soft solid trial whole. Immediate cough with coughing fit lasting >30 seconds appreciated with trials of thins by tsp, which is concerning for possible penetration/aspiration.     Recommend initiation of a puree/mildly thick liquid diet given 1:1 feeding/supervision, slow rate, small bites, no straws and HOB at 90*. Please crush meds in puree. Please stop PO with any difficulty or change in status. SLP following.     Plan    Recommend Speech Therapy 5 times per week until therapy goals are met for the following treatments:  Dysphagia Training and Patient / Family / Caregiver Education.    Discharge Recommendations: (P) Recommend post-acute placement for additional speech therapy services " prior to discharge home    Subjective    Pt was awake, answered concrete Y/N questions consistently, and was able to follow simple directions with visual model.      Objective       02/21/21 0847   Oral Motor Eval    Is Patient Able to Complete Oral Motor Eval Yes, Within Normal Limits   Laryngeal Function   Voice Quality Within Functional Limits   Volutional Cough Not Tested   Excursion Upon Swallow Complete   Oral Food Presentation   Ice Chips Within Functional Limits   Single Swallow Mildly Thick (2) - (Nectar Thick)  Within Functional Limits   Serial Swallow Mildly Thick (2) - (Nectar Thick) Within Functional Limits   Single Swallow Thin (0) Severe  (immediate coughing fit, lasting >30 seconds)   Liquidised (3) Within Functional Limits   Pureed (4) Within Functional Limits   Soft & Bite-Sized (6) - (Dysphagia III) Minimal   Self Feeding Needs Assistance   Tracheostomy   Tracheostomy  No   Dysphagia Strategies / Recommendations   Strategies / Interventions Recommended (Yes / No) Yes   Compensatory Strategies Strict 1:1 Feeding;Head of Bed 90 Degrees During Eating / Drinking;No Straws;Single Sips / Bites   Diet / Liquid Recommendation Mildly Thick (2) - (Nectar Thick);Puree (4)   Medication Administration  Crush all Medications in Puree   Therapy Interventions Dysphagia Therapy By Speech Language Pathologist   Dysphagia Rating   Nutritional Liquid Intake Rating Scale Thickened beverages (mildly thick unless otherwise specified)   Nutritional Food Intake Rating Scale Total oral diet of a single consistency   Patient / Family Goals   Patient / Family Goal #1 nodded 'yes' for wanting TV on   Short Term Goals   Short Term Goal # 1 Pt will consume a diet of puree/mildly thick liquids with no s/sx of aspiration and min cues.

## 2021-02-22 PROBLEM — I42.9 CARDIOMYOPATHY (HCC): Status: ACTIVE | Noted: 2021-02-22

## 2021-02-22 PROBLEM — I05.0 MITRAL STENOSIS: Status: ACTIVE | Noted: 2021-02-22

## 2021-02-22 LAB
ANION GAP SERPL CALC-SCNC: 8 MMOL/L (ref 7–16)
BASOPHILS # BLD AUTO: 0.6 % (ref 0–1.8)
BASOPHILS # BLD: 0.04 K/UL (ref 0–0.12)
BUN SERPL-MCNC: 14 MG/DL (ref 8–22)
CALCIUM SERPL-MCNC: 9.1 MG/DL (ref 8.5–10.5)
CHLORIDE SERPL-SCNC: 103 MMOL/L (ref 96–112)
CO2 SERPL-SCNC: 25 MMOL/L (ref 20–33)
CREAT SERPL-MCNC: 0.69 MG/DL (ref 0.5–1.4)
EOSINOPHIL # BLD AUTO: 0.15 K/UL (ref 0–0.51)
EOSINOPHIL NFR BLD: 2.1 % (ref 0–6.9)
ERYTHROCYTE [DISTWIDTH] IN BLOOD BY AUTOMATED COUNT: 49.5 FL (ref 35.9–50)
EST. AVERAGE GLUCOSE BLD GHB EST-MCNC: 120 MG/DL
GLUCOSE SERPL-MCNC: 97 MG/DL (ref 65–99)
HBA1C MFR BLD: 5.8 % (ref 0–5.6)
HCT VFR BLD AUTO: 34.7 % (ref 37–47)
HGB BLD-MCNC: 10.9 G/DL (ref 12–16)
IMM GRANULOCYTES # BLD AUTO: 0.02 K/UL (ref 0–0.11)
IMM GRANULOCYTES NFR BLD AUTO: 0.3 % (ref 0–0.9)
LYMPHOCYTES # BLD AUTO: 1.61 K/UL (ref 1–4.8)
LYMPHOCYTES NFR BLD: 22.2 % (ref 22–41)
MAGNESIUM SERPL-MCNC: 1.8 MG/DL (ref 1.5–2.5)
MCH RBC QN AUTO: 30.3 PG (ref 27–33)
MCHC RBC AUTO-ENTMCNC: 31.4 G/DL (ref 33.6–35)
MCV RBC AUTO: 96.4 FL (ref 81.4–97.8)
MONOCYTES # BLD AUTO: 0.72 K/UL (ref 0–0.85)
MONOCYTES NFR BLD AUTO: 9.9 % (ref 0–13.4)
NEUTROPHILS # BLD AUTO: 4.72 K/UL (ref 2–7.15)
NEUTROPHILS NFR BLD: 64.9 % (ref 44–72)
NRBC # BLD AUTO: 0 K/UL
NRBC BLD-RTO: 0 /100 WBC
PLATELET # BLD AUTO: 280 K/UL (ref 164–446)
PMV BLD AUTO: 10.1 FL (ref 9–12.9)
POTASSIUM SERPL-SCNC: 3.6 MMOL/L (ref 3.6–5.5)
RBC # BLD AUTO: 3.6 M/UL (ref 4.2–5.4)
SODIUM SERPL-SCNC: 136 MMOL/L (ref 135–145)
WBC # BLD AUTO: 7.3 K/UL (ref 4.8–10.8)

## 2021-02-22 PROCEDURE — 83735 ASSAY OF MAGNESIUM: CPT

## 2021-02-22 PROCEDURE — 700102 HCHG RX REV CODE 250 W/ 637 OVERRIDE(OP): Performed by: HOSPITALIST

## 2021-02-22 PROCEDURE — 770020 HCHG ROOM/CARE - TELE (206)

## 2021-02-22 PROCEDURE — A9270 NON-COVERED ITEM OR SERVICE: HCPCS | Performed by: INTERNAL MEDICINE

## 2021-02-22 PROCEDURE — 83036 HEMOGLOBIN GLYCOSYLATED A1C: CPT

## 2021-02-22 PROCEDURE — A9270 NON-COVERED ITEM OR SERVICE: HCPCS | Performed by: HOSPITALIST

## 2021-02-22 PROCEDURE — 92526 ORAL FUNCTION THERAPY: CPT

## 2021-02-22 PROCEDURE — 85025 COMPLETE CBC W/AUTO DIFF WBC: CPT

## 2021-02-22 PROCEDURE — A9270 NON-COVERED ITEM OR SERVICE: HCPCS | Performed by: NURSE PRACTITIONER

## 2021-02-22 PROCEDURE — 700102 HCHG RX REV CODE 250 W/ 637 OVERRIDE(OP): Performed by: INTERNAL MEDICINE

## 2021-02-22 PROCEDURE — 97162 PT EVAL MOD COMPLEX 30 MIN: CPT

## 2021-02-22 PROCEDURE — 80048 BASIC METABOLIC PNL TOTAL CA: CPT

## 2021-02-22 PROCEDURE — 99254 IP/OBS CNSLTJ NEW/EST MOD 60: CPT | Performed by: PHYSICAL MEDICINE & REHABILITATION

## 2021-02-22 PROCEDURE — 700102 HCHG RX REV CODE 250 W/ 637 OVERRIDE(OP): Performed by: NURSE PRACTITIONER

## 2021-02-22 PROCEDURE — 99232 SBSQ HOSP IP/OBS MODERATE 35: CPT | Performed by: NURSE PRACTITIONER

## 2021-02-22 PROCEDURE — 99233 SBSQ HOSP IP/OBS HIGH 50: CPT | Performed by: HOSPITALIST

## 2021-02-22 PROCEDURE — 97165 OT EVAL LOW COMPLEX 30 MIN: CPT

## 2021-02-22 PROCEDURE — 700111 HCHG RX REV CODE 636 W/ 250 OVERRIDE (IP): Performed by: HOSPITALIST

## 2021-02-22 RX ORDER — FUROSEMIDE 10 MG/ML
20 INJECTION INTRAMUSCULAR; INTRAVENOUS
Status: DISCONTINUED | OUTPATIENT
Start: 2021-02-22 | End: 2021-02-25 | Stop reason: HOSPADM

## 2021-02-22 RX ORDER — POTASSIUM CHLORIDE 20 MEQ/1
20 TABLET, EXTENDED RELEASE ORAL DAILY
Status: DISCONTINUED | OUTPATIENT
Start: 2021-02-22 | End: 2021-02-25 | Stop reason: HOSPADM

## 2021-02-22 RX ORDER — MAGNESIUM SULFATE HEPTAHYDRATE 40 MG/ML
2 INJECTION, SOLUTION INTRAVENOUS ONCE
Status: COMPLETED | OUTPATIENT
Start: 2021-02-22 | End: 2021-02-22

## 2021-02-22 RX ORDER — ERGOCALCIFEROL 1.25 MG/1
50000 CAPSULE ORAL
Status: DISCONTINUED | OUTPATIENT
Start: 2021-02-22 | End: 2021-02-25 | Stop reason: HOSPADM

## 2021-02-22 RX ADMIN — MAGNESIUM SULFATE 2 G: 2 INJECTION INTRAVENOUS at 14:26

## 2021-02-22 RX ADMIN — ERGOCALCIFEROL 50000 UNITS: 1.25 CAPSULE ORAL at 14:55

## 2021-02-22 RX ADMIN — ENOXAPARIN SODIUM 40 MG: 40 INJECTION SUBCUTANEOUS at 14:26

## 2021-02-22 RX ADMIN — FUROSEMIDE 20 MG: 10 INJECTION, SOLUTION INTRAMUSCULAR; INTRAVENOUS at 14:27

## 2021-02-22 RX ADMIN — ASPIRIN 324 MG: 81 TABLET, CHEWABLE ORAL at 05:43

## 2021-02-22 RX ADMIN — POTASSIUM CHLORIDE 20 MEQ: 1500 TABLET, EXTENDED RELEASE ORAL at 14:26

## 2021-02-22 RX ADMIN — ATORVASTATIN CALCIUM 80 MG: 80 TABLET, FILM COATED ORAL at 18:04

## 2021-02-22 ASSESSMENT — ACTIVITIES OF DAILY LIVING (ADL): TOILETING: INDEPENDENT

## 2021-02-22 ASSESSMENT — COGNITIVE AND FUNCTIONAL STATUS - GENERAL
PERSONAL GROOMING: A LITTLE
HELP NEEDED FOR BATHING: A LOT
MOBILITY SCORE: 19
DRESSING REGULAR LOWER BODY CLOTHING: A LITTLE
TOILETING: A LOT
TOILETING: A LITTLE
PERSONAL GROOMING: A LITTLE
MOBILITY SCORE: 12
MOVING TO AND FROM BED TO CHAIR: A LITTLE
SUGGESTED CMS G CODE MODIFIER DAILY ACTIVITY: CK
MOVING TO AND FROM BED TO CHAIR: A LITTLE
WALKING IN HOSPITAL ROOM: TOTAL
DAILY ACTIVITIY SCORE: 19
DAILY ACTIVITIY SCORE: 15
CLIMB 3 TO 5 STEPS WITH RAILING: TOTAL
DRESSING REGULAR UPPER BODY CLOTHING: A LITTLE
SUGGESTED CMS G CODE MODIFIER DAILY ACTIVITY: CK
DRESSING REGULAR LOWER BODY CLOTHING: A LOT
CLIMB 3 TO 5 STEPS WITH RAILING: A LITTLE
HELP NEEDED FOR BATHING: A LITTLE
STANDING UP FROM CHAIR USING ARMS: A LITTLE
SUGGESTED CMS G CODE MODIFIER MOBILITY: CL
MOVING FROM LYING ON BACK TO SITTING ON SIDE OF FLAT BED: A LOT
MOVING FROM LYING ON BACK TO SITTING ON SIDE OF FLAT BED: A LITTLE
WALKING IN HOSPITAL ROOM: A LITTLE
STANDING UP FROM CHAIR USING ARMS: TOTAL
SUGGESTED CMS G CODE MODIFIER MOBILITY: CK
DRESSING REGULAR UPPER BODY CLOTHING: A LOT

## 2021-02-22 ASSESSMENT — LIFESTYLE VARIABLES
TOTAL SCORE: 0
TOTAL SCORE: 0
AVERAGE NUMBER OF DAYS PER WEEK YOU HAVE A DRINK CONTAINING ALCOHOL: 0
CONSUMPTION TOTAL: NEGATIVE
HAVE YOU EVER FELT YOU SHOULD CUT DOWN ON YOUR DRINKING: NO
DOES PATIENT WANT TO STOP DRINKING: CANNOT ASSESS
HAVE PEOPLE ANNOYED YOU BY CRITICIZING YOUR DRINKING: NO
EVER HAD A DRINK FIRST THING IN THE MORNING TO STEADY YOUR NERVES TO GET RID OF A HANGOVER: NO
REASON UNABLE TO ASSESS: CVA
TOTAL SCORE: 0
ALCOHOL_USE: NO
EVER FELT BAD OR GUILTY ABOUT YOUR DRINKING: NO
ON A TYPICAL DAY WHEN YOU DRINK ALCOHOL HOW MANY DRINKS DO YOU HAVE: 0
HOW MANY TIMES IN THE PAST YEAR HAVE YOU HAD 5 OR MORE DRINKS IN A DAY: 0

## 2021-02-22 ASSESSMENT — PATIENT HEALTH QUESTIONNAIRE - PHQ9
1. LITTLE INTEREST OR PLEASURE IN DOING THINGS: NOT AT ALL
2. FEELING DOWN, DEPRESSED, IRRITABLE, OR HOPELESS: NOT AT ALL
1. LITTLE INTEREST OR PLEASURE IN DOING THINGS: NOT AT ALL
SUM OF ALL RESPONSES TO PHQ9 QUESTIONS 1 AND 2: 0
SUM OF ALL RESPONSES TO PHQ9 QUESTIONS 1 AND 2: 0
2. FEELING DOWN, DEPRESSED, IRRITABLE, OR HOPELESS: NOT AT ALL

## 2021-02-22 ASSESSMENT — GAIT ASSESSMENTS
GAIT LEVEL OF ASSIST: MINIMAL ASSIST
DISTANCE (FEET): 15

## 2021-02-22 ASSESSMENT — PAIN DESCRIPTION - PAIN TYPE
TYPE: ACUTE PAIN

## 2021-02-22 NOTE — PROGRESS NOTES
Hospital Medicine Daily Progress Note    Date of Service  2/22/2021    Chief Complaint  60 y.o. female admitted 2/20/2021 with right-sided weakness and aphasia    Hospital Course  60-year-old female with history of methamphetamine abuse Crohn's disease presented with right-sided weakness and difficulty with speech.  She received alteplase and subsequent left M1 thrombectomy with TICI 2B flow.  She was noted to have cardiomyopathy with an EF of 25% moderate MS.    Interval Problem Update    Patient is alert continues to have expressive aphasia  She is afebrile  Currently in sinus rhythm    Consultants/Specialty  Neurology  Critical care    Code Status  Full Code    Disposition  Rehab versus SNF    Review of Systems  Review of Systems   Unable to perform ROS: Medical condition        Physical Exam  Temp:  [36.4 °C (97.5 °F)-36.8 °C (98.2 °F)] 36.5 °C (97.7 °F)  Pulse:  [] 106  Resp:  [14-62] 25  BP: (116-145)/() 139/84  SpO2:  [88 %-100 %] 99 %    Physical Exam  Vitals and nursing note reviewed.   Constitutional:       General: She is not in acute distress.     Comments: Thin female   HENT:      Head: Normocephalic and atraumatic.      Nose: Nose normal.      Mouth/Throat:      Pharynx: No oropharyngeal exudate or posterior oropharyngeal erythema.   Eyes:      General:         Right eye: No discharge.         Left eye: No discharge.   Cardiovascular:      Rate and Rhythm: Normal rate and regular rhythm.      Heart sounds: Murmur present. No friction rub. No gallop.    Pulmonary:      Effort: Pulmonary effort is normal. No respiratory distress.      Breath sounds: No stridor. No rhonchi or rales.   Chest:      Chest wall: No tenderness.   Abdominal:      General: Bowel sounds are normal. There is no distension.      Palpations: Abdomen is soft. There is no mass.      Tenderness: There is no abdominal tenderness. There is no guarding.   Musculoskeletal:         General: No swelling or tenderness.       Cervical back: Neck supple.   Skin:     General: Skin is warm and dry.      Coloration: Skin is not cyanotic.      Nails: There is no clubbing.   Neurological:      General: No focal deficit present.      Mental Status: She is alert and oriented to person, place, and time.      Cranial Nerves: Cranial nerve deficit present.      Motor: No weakness.      Comments: Expressive aphasia   Psychiatric:         Behavior: Behavior normal.         Fluids    Intake/Output Summary (Last 24 hours) at 2/22/2021 1404  Last data filed at 2/22/2021 0600  Gross per 24 hour   Intake 120 ml   Output 515 ml   Net -395 ml       Laboratory  Recent Labs     02/20/21  1551 02/22/21  0430   WBC 7.0 7.3   RBC 3.93* 3.60*   HEMOGLOBIN 11.8* 10.9*   HEMATOCRIT 38.0 34.7*   MCV 96.7 96.4   MCH 30.0 30.3   MCHC 31.1* 31.4*   RDW 51.4* 49.5   PLATELETCT 443 280   MPV 10.3 10.1     Recent Labs     02/20/21  1551 02/22/21  0430   SODIUM 139 136   POTASSIUM 4.0 3.6   CHLORIDE 106 103   CO2 21 25   GLUCOSE 122* 97   BUN 18 14   CREATININE 0.78 0.69   CALCIUM 8.6 9.1     Recent Labs     02/20/21  1551   APTT 26.9   INR 1.19*         Recent Labs     02/20/21  1551   TRIGLYCERIDE 113   HDL 36*   *       Imaging  MR-BRAIN-W/O   Final Result      1.  Small to moderate sized acute infarct involving the left MCA territory with no evidence of hemorrhagic transformation.   2.  Mild diffuse cerebral substance loss.   3.  Mild microangiopathic ischemic change versus demyelination or gliosis.      DR-YNHLUNR-6 VIEW   Final Result      No evidence of an implanted electronic device within the abdomen or pelvis which would preclude MRI scanning.      DX-CHEST-PORTABLE (1 VIEW)   Final Result      Bilateral interstitial infiltrates.      EC-ECHOCARDIOGRAM COMPLETE W/O CONT   Final Result      IR-THROMBECTOMY ARTERY SECONDARY   Final Result      1.  Occluded left M1 segment.   2.  Mechanical thrombectomy.   3.  TICI 2b flow.      CT-CEREBRAL PERFUSION ANALYSIS    Final Result      1.  Cerebral blood flow less than 30% likely representing completed infarct = 0 mL.      2.  T Max more than 6 seconds likely representing combination of completed infarct and ischemia = 142 mL.      3.  Mismatched volume likely representing ischemic brain/penumbra = 142 mL      4.  Please note that the cerebral perfusion was performed on the limited brain tissue around the basal ganglia region. Infarct/ischemia outside the CT perfusion sections can be missed in this study.      CT-CTA NECK WITH & W/O-POST PROCESSING   Final Result      1.  Atherosclerotic disease as described. Resulting stenosis is less than 50%.      2.  Bilateral pleural effusions layer posteriorly. Adjacent airspace disease likely atelectasis.      CT-CTA HEAD WITH & W/O-POST PROCESS   Final Result      Occlusion of the left M1 artery.      BRITTANY YE was aware of this abnormality at 4:15 PM on 2/20/2021.      CT-HEAD W/O   Final Result      1.  No acute intracranial findings.      2.  Mild diffuse atrophy. Periventricular white matter changes consistent with chronic small vessel disease.              Assessment/Plan  * CVA (cerebral vascular accident) (HCC)- (present on admission)  Assessment & Plan  Status post alteplase and thrombectomy left M1 with  TICI 2 b    Continue telemetry monitoring   Continue aspirin and statin  Neurology recommended cardiology eval for loop recorder Dr. Stein contacted recommended continue telemetry monitoring as inpatient and Holter monitor on discharge  PT/OT/SLP  Counseled on methamphetamine cessation and smoking cessation    Abnormal chest CT- (present on admission)  Assessment & Plan  Bilateral pleural effusions  Diurese as  tolerated    Pleural effusion, bilateral- (present on admission)  Assessment & Plan  Likely related to her cardiomyopathy  We will start Lasix    Mitral stenosis  Assessment & Plan  Need outpatient follow-up with cardiology    Cardiomyopathy ([facility])  Assessment &  Plan  Likely secondary to methamphetamine use  Start patient on Lasix  Blood pressure tolerates diuresis start low-dose ACE inhibitor and beta-blocker and titrate as tolerated  She will need follow-up with cardiology after she recovers from her acute hospitalization for further work-up and monitoring    Acute cystitis without hematuria- (present on admission)  Assessment & Plan  Pyuria patient is asymptomatic  Monitor off antibiotics    Methamphetamine abuse (HCC)- (present on admission)  Assessment & Plan  Counseled on cessation    Crohn disease (HCC)- (present on admission)  Assessment & Plan  Patient has known disease.    Vitamin D deficiency  Assessment & Plan  P.o. repletion       VTE prophylaxis: Lovenox
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
## 2021-02-22 NOTE — THERAPY
Speech Language Pathology  Daily Treatment     Patient Name: Imani Stephen  Age:  60 y.o., Sex:  female  Medical Record #: [id number]  Today's Date: 2/22/2021     Precautions  Precautions: Swallow Precautions ( See Comments)  Comments: (P) Aphasic     Assessment  Patient seen this date for dysphagia tx session, s/p request for high f/u from previous clinician. Patient awake, alert, and seen with PU4/MT2 breakfast tray. Patient awake, alert, and noted to have expressive language deficits. Patient with flat affect, but pleasant and cooperative during session. Patient consumed 100% of breakfast tray with direct feeding from this clinician. Patient had cough x1 on large sip of MTL, as she is impulsive at times and requires cues to reduce bite/sip size and rate. When cued, patient reduced same and patient had no further s/sx of aspiration during entire meal. Patient nodded head when asked if she ate softer foods at baseline d/t edentulous state.     At this time, recommend patient continue PU4/MT2 diet with 1:1 supervision and assistance as needed. Crush meds in puree. No straws. SLP will continue to follow for dysphagia tx and complete aphasia evaluation as able and appropriate.     Plan  Continue current treatment plan.    Discharge Recommendations: Recommend post-acute placement for additional speech therapy services prior to discharge home     Objective     02/22/21 1105   Precautions   Precautions Swallow Precautions ( See Comments)   Comments Aphasic    Vitals   O2 (LPM) 2   O2 Delivery Device Nasal Cannula   Dysphagia    Positioning / Behavior Modification Self Monitoring;Modulate Rate or Bite Size;Multiple Swallows   Diet / Liquid Recommendation Puree (4);Mildly Thick (2) - (Nectar Thick)   Recommended Route of Medication Administration   Medication Administration  Crush all Medications in Puree   Short Term Goals   Short Term Goal # 1 Pt will consume a diet of puree/mildly thick liquids with no s/sx of  aspiration and min cues.    Goal Outcome # 1 Progressing as expected   Anticipated Discharge Needs   Discharge Recommendations Recommend post-acute placement for additional speech therapy services prior to discharge home

## 2021-02-22 NOTE — THERAPY
"Occupational Therapy   Initial Evaluation     Patient Name: Imani Stephen  Age:  60 y.o., Sex:  female  Medical Record #: [id number]  Today's Date: 2/22/2021     Precautions  Precautions: Swallow Precautions ( See Comments)  Comments: Aphasic     Assessment  Patient is 60 y.o. female who presents to acute due to new onset of R sided weakness, received tPA and is s/p thrombectomy. Pt appears to be primarily limited by aphasia and unable to provide PLOF information. Pt performed LB dressing w/ SPV and required min A for standing grooming requiring v/c's for initiation of task. Pt demo'd impaired balance, functional mobility, and activity tolerance impacting functional independence.     Plan    Recommend Occupational Therapy 3 times per week until therapy goals are met for the following treatments:  Adaptive Equipment, Neuro Re-Education / Balance, Self Care/Activities of Daily Living, Therapeutic Activities and Therapeutic Exercises.    DC Equipment Recommendations: (P) Unable to determine at this time  Discharge Recommendations: (P) Recommend post-acute placement for additional occupational therapy services prior to discharge home(anticipate quick progression while in house)     Subjective    \"This bed\"     Objective       02/22/21 1014   Total Time Spent   Total Time Spent (Mins) 24   Charge Group   OT Evaluation OT Evaluation Low   Initial Contact Note    Initial Contact Note Order Received and Verified, Occupational Therapy Evaluation in Progress with Full Report to Follow.   Prior Living Situation   Prior Services Unable To Determine At This Time   Comments pt appears to be an unreliable historian, reports she lives in her bed in the hospital   Prior Level of ADL Function   Self Feeding Independent   Grooming / Hygiene Independent   Bathing Independent   Dressing Independent   Toileting Independent   Precautions   Precautions Fall Risk;Swallow Precautions ( See Comments)   Comments aphasic    Vitals   O2 (LPM) 2 "   O2 Delivery Device Nasal Cannula   Pain 0 - 10 Group   Therapist Pain Assessment Post Activity Pain Same as Prior to Activity;Nurse Notified  (no c/o or indications of pain during session)   Cognition    Cognition / Consciousness X   Speech/ Communication Delayed Responses;Word Finding Impairment   Level of Consciousness Alert   Safety Awareness Impaired;Impulsive   Initiation Impaired   Comments pleasent, cooperative, increased processing time, minimal verbal output   Active ROM Upper Body   Active ROM Upper Body  WDL   Strength Upper Body   Upper Body Strength  WDL   Coordination Upper Body   Coordination WDL   Balance Assessment   Sitting Balance (Static) Good   Sitting Balance (Dynamic) Fair +   Standing Balance (Static) Fair   Standing Balance (Dynamic) Fair -   Weight Shift Sitting Good   Weight Shift Standing Fair   Comments no AD, no moinka LOB   Bed Mobility    Supine to Sit Supervised   Sit to Supine   (NT in chair post)   Scooting Supervised   ADL Assessment   Grooming Minimal Assist;Standing  (oral care at sink)   Upper Body Dressing Minimal Assist   Lower Body Dressing Supervision  (socks)   Toileting   (ulloa in place)   How much help from another person does the patient currently need...   Putting on and taking off regular lower body clothing? 3   Bathing (including washing, rinsing, and drying)? 3   Toileting, which includes using a toilet, bedpan, or urinal? 3   Putting on and taking off regular upper body clothing? 3   Taking care of personal grooming such as brushing teeth? 3   Eating meals? 4   6 Clicks Daily Activity Score 19   Functional Mobility   Sit to Stand Minimal Assist   Bed, Chair, Wheelchair Transfer Minimal Assist   Mobility within room, no AD   ICU Target Mobility Level   ICU Mobility - Targeted Level Level 4   Activity Tolerance   Sitting in Chair 10+ min (up post)   Sitting Edge of Bed 5 min   Standing 8 min   Patient / Family Goals   Patient / Family Goal #1 unable to state    Short Term Goals   Short Term Goal # 1 Pt will demo toilet txf w/ SPV   Short Term Goal # 2 Pt will demo standing grooming w/ SPV   Short Term Goal # 3 Pt will demo toilet hygiene w/ SPV   Education Group   Role of Occupational Therapist Patient Response Patient;Acceptance;Explanation;Demonstration;Verbal Demonstration   Problem List   Problem List Decreased Active Daily Living Skills;Decreased Homemaking Skills;Decreased Functional Mobility;Decreased Activity Tolerance;Safety Awareness Deficits / Cognition;Impaired Postural Control / Balance   Anticipated Discharge Equipment and Recommendations   DC Equipment Recommendations Unable to determine at this time   Discharge Recommendations Recommend post-acute placement for additional occupational therapy services prior to discharge home  (anticipate quick progression while in house)   Interdisciplinary Plan of Care Collaboration   IDT Collaboration with  Nursing   Patient Position at End of Therapy Seated;Chair Alarm On;Call Light within Reach;Tray Table within Reach;Phone within Reach   Collaboration Comments report given   Session Information   Date / Session Number  2/22, 1 (1/3, 2/28)   Priority 2

## 2021-02-22 NOTE — PROGRESS NOTES
Neurology Progress Note  Neurohospitalist Service, [facility]    Referring Physician: Hakeem Almaguer M.D.    CC: Right sided weakness and hemineglect    HPI: Refer to initial documented Neurology H&P, as detailed in the patient's chart.    Interval History 2/22/2021: No acute events overnight. Patient is currently sitting up in bed, awake, alert, following commands with some difficulty to complex commands, and oriented to self, but disoriented to place, time, and situation likely complicated by mixed expressive and slight receptive aphasia. She denies headache, vision changes, facial weakness, focal extremity weakness, numbness, tingling, or abnormal movements.     Past Medical History:   Past Medical History:   Diagnosis Date   • Allergy    • Arthritis    • Depression 3/17/2010   • Headache(784.0)    • Hyperlipidemia    • Other and unspecified hyperlipidemia 4/20/2010   • Renal cell carcinoma 10/21/2011   • Renal mass    • Unilateral paralysis due to acute cerebrovascular accident (CVA) ([facility]) 2/20/2021   • Unspecified vitamin D deficiency 4/20/2010        FHx:  Family History   Problem Relation Age of Onset   • Hypertension Mother    • Hypertension Father    • Heart Disease Sister    • Cancer Sister    • Other Daughter         Seizure   • Lung Disease Neg Hx    • Diabetes Neg Hx    • Stroke Neg Hx         SHx:  Social History     Socioeconomic History   • Marital status: Single     Spouse name: Not on file   • Number of children: Not on file   • Years of education: Not on file   • Highest education level: Not on file   Occupational History   • Not on file   Tobacco Use   • Smoking status: Current Every Day Smoker     Packs/day: 0.50     Years: 35.00     Pack years: 17.50     Types: Cigarettes   • Smokeless tobacco: Never Used   Substance and Sexual Activity   • Alcohol use: No   • Drug use: No   • Sexual activity: Yes     Birth control/protection: Post-Menopausal   Other Topics Concern    •  Service Not Asked   • Blood Transfusions Not Asked   • Caffeine Concern Not Asked   • Occupational Exposure Yes     Comment: Cleaning solutions   • Hobby Hazards Not Asked   • Sleep Concern Not Asked   • Stress Concern Not Asked   • Weight Concern Not Asked   • Special Diet Not Asked   • Back Care Not Asked   • Exercise Not Asked   • Bike Helmet Not Asked   • Seat Belt Not Asked   • Self-Exams Not Asked   Social History Narrative    Occupation: unemployed, previously worked as      Social Determinants of Health     Financial Resource Strain:    • Difficulty of Paying Living Expenses:    Food Insecurity:    • Worried About Running Out of Food in the Last Year:    • Ran Out of Food in the Last Year:    Transportation Needs:    • Lack of Transportation (Medical):    • Lack of Transportation (Non-Medical):    Physical Activity:    • Days of Exercise per Week:    • Minutes of Exercise per Session:    Stress:    • Feeling of Stress :    Social Connections:    • Frequency of Communication with Friends and Family:    • Frequency of Social Gatherings with Friends and Family:    • Attends Mu-ism Services:    • Active Member of Clubs or Organizations:    • Attends Club or Organization Meetings:    • Marital Status:    Intimate Partner Violence:    • Fear of Current or Ex-Partner:    • Emotionally Abused:    • Physically Abused:    • Sexually Abused:         Medications:    Current Facility-Administered Medications:   •  aspirin (ASA) tablet 325 mg, 325 mg, Oral, DAILY **OR** aspirin (ASA) chewable tab 324 mg, 324 mg, Oral, DAILY, 324 mg at 02/22/21 0543 **OR** aspirin (ASA) suppository 300 mg, 300 mg, Rectal, DAILY, Belkis Hardy, A.P.R.N.  •  senna-docusate (PERICOLACE or SENOKOT S) 8.6-50 MG per tablet 2 tablet, 2 tablet, Oral, BID, Stopped at 02/20/21 1800 **AND** polyethylene glycol/lytes (MIRALAX) PACKET 1 Packet, 1 Packet, Oral, QDAY PRN **AND** magnesium hydroxide (MILK OF MAGNESIA)  "suspension 30 mL, 30 mL, Oral, QDAY PRN **AND** bisacodyl (DULCOLAX) suppository 10 mg, 10 mg, Rectal, QDAY PRN, Naun Burnett M.D.  •  atorvastatin (LIPITOR) tablet 80 mg, 80 mg, Oral, Q EVENING, Naun Burnett M.D., 80 mg at 02/21/21 4168    Allergies:  Allergies   Allergen Reactions   • Codeine Itching and Vomiting   • Pcn [Penicillins] Itching     Tolerates cephalosporins        Review of systems: In addition to what is detailed in the interval history above, all other systems reviewed and are negative.      Physical Examination:   Vitals:    02/22/21 0800 02/22/21 0900 02/22/21 1000 02/22/21 1100   BP: 129/89 127/87 136/85 139/84   Pulse: 98 (!) 103 (!) 101 (!) 106   Resp: 18 (!) 24 (!) 24 (!) 25   Temp: 36.5 °C (97.7 °F)      TempSrc: Temporal      SpO2: 100% 98% 100% 99%   Weight:       Height:         General: Patient in no acute distress, pleasant and cooperative.  HEENT: Normocephalic, no signs of acute trauma.   Neck: Supple, no meningeal signs. There is normal range of motion. No tenderness on exam.   Chest: Clear to auscultation. No cough.   CV: RRR, no murmurs.   Skin: No signs of acute rashes or trauma.   Musculoskeletal: Joints exhibit full range of motion, without any pain to palpation. There are no signs of joint or muscle swelling. There is no tenderness to deep palpation of muscles.   Psychiatric: No hallucinatory behavior. Denies symptoms of depression or suicidal ideation. Mood and affect appear appropriate on exam, becoming tearful when discussing MRI brain results, denying social support, and stating, \"I want to go.\"     NEUROLOGICAL EXAM:   Mental status, orientation: Awake, alert, following simple commands with difficulty with complex commands, and oriented to self, but not place, time, or situation.    Speech and language: Speech is limited with mild to moderate dysarthria and disjointed with mixed expressive more so than receptive aphasia. The patient is able to name 6/9 items " with 3 paraphasic errors, repeat and read 8/10 with 2 errors, and comprehend all simple commands but difficulty comprehending complex commands 2-step commands.   Memory: There is impaired recollection of recent and remote events.   Cranial nerve exam: Pupils are 3-4 mm bilaterally and equally reactive to light and accommodation. Visual fields are full to field test. There is no nystagmus on primary or secondary gaze. Intact full EOM in all directions of gaze. Face appears asymmetric with minor left nasolabial fold flattening. Sensation in the face is intact to light touch. Tongue is midline and without any signs of tongue biting or fasciculations. Sternocleidomastoid muscles exhibit is normal strength bilaterally. Shoulder shrug is intact bilaterally.   Motor exam: Strength is 5/5 in all extremities. Tone is normal. No abnormal movements were seen on exam.   Sensory exam Reveals normal sense of light touch, temperature, and pinprick in all extremities.   Deep tendon reflexes:  2+ throughout. Plantar responses are flexor on the left, mute on the right. There is no clonus.   Coordination: Shows a normal finger-nose-finger and heel-down-shin with some difficulty comprehending commands. Normal rapidly alternating movements.   Gait: Deferred given high fall risk.     NIH Stroke Scale  2/22/21    1a. Level of Consciousness (Alert, drowsy, etc): 0= Alert    1b. LOC Questions (Month, age): 2= Incorrect    1c. LOC Commands (Open/close eyes make fist/let go): 0= Obeys both correctly    2.   Best Gaze (Eyes open - patient follows examiner's finger on face): 0= Normal    3.   Visual Fields (introduce visual stimulus/threat to patient's field quadrants): 0= No visual loss    4.   Facial Paresis (Show teeth, raise eyebrows and squeeze eyes shut): 1= Minor     5a. Motor Arm - Left (Elevate arm to 90 degrees if patient is sitting, 45 degrees if  supine): 0= No drift    5b. Motor Arm - Right (Elevate arm to 90 degrees if patient is  sitting, 45 degrees if supine): 0= No drift    6a. Motor Leg - Left (Elevate leg 30 degrees with patient supine): 0= No drift    6b. Motor Leg - Right  (Elevate leg 30 degrees with patient supine): 0= No drift    7.   Limb Ataxia (Finger-nose, heel down shin): 0= No ataxia    8.   Sensory (Pin prick to face, arm, trunk and leg - compare side to side): 0= Normal    9.  Best Language (Name item, describe a picture and read sentences): 2= Severe aphasia    10. Dysarthria (Evaluate speech clarity by patient repeating listed words): 1= Mild to moderate slurring    11. Extinction and Inattention (Use information from prior testing to identify neglect or  double simultaneous stimuli testing): 0= No neglect    Total NIH Score: 6      Ancillary Data Reviewed:    Labs:  Lab Results   Component Value Date/Time    PROTHROMBTM 15.5 (H) 02/20/2021 03:51 PM    INR 1.19 (H) 02/20/2021 03:51 PM      Lab Results   Component Value Date/Time    WBC 7.3 02/22/2021 04:30 AM    RBC 3.60 (L) 02/22/2021 04:30 AM    HEMOGLOBIN 10.9 (L) 02/22/2021 04:30 AM    HEMATOCRIT 34.7 (L) 02/22/2021 04:30 AM    MCV 96.4 02/22/2021 04:30 AM    MCH 30.3 02/22/2021 04:30 AM    MCHC 31.4 (L) 02/22/2021 04:30 AM    MPV 10.1 02/22/2021 04:30 AM    NEUTSPOLYS 64.90 02/22/2021 04:30 AM    LYMPHOCYTES 22.20 02/22/2021 04:30 AM    MONOCYTES 9.90 02/22/2021 04:30 AM    EOSINOPHILS 2.10 02/22/2021 04:30 AM    BASOPHILS 0.60 02/22/2021 04:30 AM      Lab Results   Component Value Date/Time    SODIUM 136 02/22/2021 04:30 AM    POTASSIUM 3.6 02/22/2021 04:30 AM    CHLORIDE 103 02/22/2021 04:30 AM    CO2 25 02/22/2021 04:30 AM    GLUCOSE 97 02/22/2021 04:30 AM    BUN 14 02/22/2021 04:30 AM    CREATININE 0.69 02/22/2021 04:30 AM      Lab Results   Component Value Date/Time    CHOLSTRLTOT 183 02/20/2021 03:51 PM     (H) 02/20/2021 03:51 PM    HDL 36 (A) 02/20/2021 03:51 PM    TRIGLYCERIDE 113 02/20/2021 03:51 PM       Lab Results   Component Value Date/Time     ALKPHOSPHAT 90 02/20/2021 03:51 PM    ASTSGOT 28 02/20/2021 03:51 PM    ALTSGPT 41 02/20/2021 03:51 PM    TBILIRUBIN 0.7 02/20/2021 03:51 PM        Imaging/Testing:    I interpreted and/or reviewed the patient's neuroimaging    MR-BRAIN-W/O   Final Result      1.  Small to moderate sized acute infarct involving the left MCA territory with no evidence of hemorrhagic transformation.   2.  Mild diffuse cerebral substance loss.   3.  Mild microangiopathic ischemic change versus demyelination or gliosis.      HE-WIYRYYX-7 VIEW   Final Result      No evidence of an implanted electronic device within the abdomen or pelvis which would preclude MRI scanning.      DX-CHEST-PORTABLE (1 VIEW)   Final Result      Bilateral interstitial infiltrates.      EC-ECHOCARDIOGRAM COMPLETE W/O CONT   Final Result      IR-THROMBECTOMY ARTERY SECONDARY   Final Result      1.  Occluded left M1 segment.   2.  Mechanical thrombectomy.   3.  TICI 2b flow.      CT-CEREBRAL PERFUSION ANALYSIS   Final Result      1.  Cerebral blood flow less than 30% likely representing completed infarct = 0 mL.      2.  T Max more than 6 seconds likely representing combination of completed infarct and ischemia = 142 mL.      3.  Mismatched volume likely representing ischemic brain/penumbra = 142 mL      4.  Please note that the cerebral perfusion was performed on the limited brain tissue around the basal ganglia region. Infarct/ischemia outside the CT perfusion sections can be missed in this study.      CT-CTA NECK WITH & W/O-POST PROCESSING   Final Result      1.  Atherosclerotic disease as described. Resulting stenosis is less than 50%.      2.  Bilateral pleural effusions layer posteriorly. Adjacent airspace disease likely atelectasis.      CT-CTA HEAD WITH & W/O-POST PROCESS   Final Result      Occlusion of the left M1 artery.      BRITTANY YE was aware of this abnormality at 4:15 PM on 2/20/2021.      CT-HEAD W/O   Final Result      1.  No acute  intracranial findings.      2.  Mild diffuse atrophy. Periventricular white matter changes consistent with chronic small vessel disease.             Assessment:    Imani Stephen is a 60 y.o. female with relevant history of anxiety, depression, dyslipidemia, vitamin D deficiency, Crohn's disease, renal cell carcinoma, arthritis, and methamphetamine abuse who presented on 2/20/21 for right sided weakness and quang-neglect whom neurology was consulted to address acute stroke. On arrival, NIHSS was 23 significant for drowsiness, disorientation, inability to follow commands, gaze preference, right visual neglect/vision field cut, left facial droop, dysarthria, paucity of speech, and right hemiparesis and quang-neglect.  CT head revealed no acute intracranial abnormality; however, CTA head and neck revealed left MCA M1 occlusion.  Patient received IV TPA at 1615 on 2/20/21 and underwent left MCA thrombectomy with TICI 2b reperfusion on 2/20/21. MRI brain without contrast on 2/21/21 revealed a moderate sized acute infarct involving the left MCA territory with no hemorrhagic transformation. Neurological exam continues to improve following TPA and thrombectomy, with NIHSS now 6 significant for disorientation, mixed aphasia, mild dysarthria, and left nasolabial fold flattening.  Etiology of stroke is likely cardioembolic with concomitant cardiomyopathy secondary to methamphetamine abuse given TTE with bubble study revealed EF 25%, severely dilated left atrium, but no shunt.    Plan:    -q4h and PRN neuro assessment. VS per nursing/unit protocol.   -SBP goal < 160 per TICI guidelines for TICI 2b reperfusion. Antihypertensives per primary team.   -Telemetry; currently tachycardic. Screen for Afib/arrhythmia.    -Recommend Cardiology consult and outpatient extended cardiac event monitoring for Afib (eg. Zio patch vs. Loop)  -Continue  mg PO q day   -Continue Atorvastatin 80 mg PO q HS. Note lipid panel  (goal<70)  and HDL 36 (goal>40).   -Recommend aggressive BG management per primary team. Avoid IVF with Dextrose. BG goal 140-180. Check hemoglobin [id number] as it was not completed on 2/20/21.   -PT/OT/SLP eval and treat. PT pending. SLP and OT recommending post acute placement  -Physiatry consulted.  -Counseled patient at length regarding life style and risk factor modification for secondary stroke prevention.  -Follow up outpatient at [facility]. Referral placed.    -All other medical management per primary team.   -DVT PPX: SCDs and enoxaparin SQ.      No further recommendations or further studies from a neurological standpoint at this time. Please re-consult if you have further questions or there is a change in status.    The evaluation of the patient, and recommended management, was discussed with Dr. Marianne Moore, Dr. Sanjay Almaguer, and bedside RN. I have performed a physical exam and reviewed and updated ROS and Plan today (2/22/2021). In review of yesterday's note (2/21/2021), there are no changes except as documented above.    CRYSTAL Cyr.   Nurse Practitioner, Neurohospitalist  [facility]  t) [phone] (f) [phone]

## 2021-02-22 NOTE — THERAPY
"Physical Therapy   Initial Evaluation     Patient Name: Imani Stephen  Age:  60 y.o., Sex:  female  Medical Record #: [id number]  Today's Date: 2/22/2021     Precautions: Swallow Precautions ( See Comments)    Assessment  Patient is 60 y.o. female that presented to acute with complaint of R sided weakness. Medical dx of CVA with occlusion of L M1 artery, s/p TPA without improvement and s/p thrombectomy. PMHx significant for anxiety, depression, DLD, Crohn's disease, EF 25%, substance abuse. She presented to PT with apparent impaired cognition, impaired attention and arousal, impaired balance, and decreased activity tolerance which are limiting her ability to safely perform mobility at OF. Anticipate she will progress well with continued mobilization however primary concern for safety and self care given cognition. Recommend patient mobilize to chair 3x/day at meal times and to commode for toileting with RN staff to progress OOB activity and independence with mobility. Will follow.    Plan    Recommend Physical Therapy 3 times per week until therapy goals are met for the following treatments:  Bed Mobility, Community Re-integration, Equipment, Gait Training, Manual Therapy, Neuro Re-Education / Balance, Self Care/Home Evaluation, Stair Training, Therapeutic Activities and Therapeutic Exercises    DC Equipment Recommendations: Unable to determine at this time  Discharge Recommendations: (defer to other disciplines, anticipate progress with mobilit)       Subjective    \"hospital\" (when asked where she lives)     Objective       02/22/21 1016   Total Time Spent   Total Time Spent (Mins) 20   Charge Group   PT Evaluation PT Evaluation Mod   Initial Contact Note    Initial Contact Note Order Received and Verified, Physical Therapy Evaluation in Progress with Full Report to Follow.   Precautions   Precautions Fall Risk;Swallow Precautions ( See Comments)   Comments aphasic   Vitals   O2 (LPM) 2   O2 Delivery Device " "Silicone Nasal Cannula   Pain 0 - 10 Group   Therapist Pain Assessment   (no pain complaint)   Prior Living Situation   Prior Services Unable To Determine At This Time   Comments Unable to obtain social/PLOF, patient reported \"hospital\" when asked where she lived   Prior Level of Functional Mobility   Bed Mobility Independent   Transfer Status Independent   Ambulation Independent   Distance Ambulation (Feet)   (community)   Assistive Devices Used None   Comments above assumed based on function; unable to determine   History of Falls   History of Falls   (unable to ascertain)   Cognition    Cognition / Consciousness X   Speech/ Communication Delayed Responses;Word Finding Impairment   Level of Consciousness Alert   Ability To Follow Commands 1 Step   Safety Awareness Impaired;Impulsive   Attention Impaired   Initiation Impaired   Comments cooperative; gross delay and limited verbalizations   Passive ROM Lower Body   Passive ROM Lower Body WDL   Comments not formally tested, WFL for mobility   Active ROM Lower Body    Active ROM Lower Body  WDL   Comments as above   Strength Lower Body   Lower Body Strength  WDL   Comments as above   Sensation Lower Body   Lower Extremity Sensation   Not Tested   Lower Body Muscle Tone   Lower Body Muscle Tone  WDL   Neurological Concerns   Neurological Concerns Yes   Comments given cognition   Coordination Lower Body    Coordination Lower Body  WDL   Balance Assessment   Sitting Balance (Static) Good   Sitting Balance (Dynamic) Fair +   Standing Balance (Static) Fair   Standing Balance (Dynamic) Fair   Weight Shift Sitting Good   Weight Shift Standing Fair   Comments no AD, no LOB   Gait Analysis   Gait Level Of Assist Minimal Assist  (for safety given cognition)   Assistive Device None   Distance (Feet) 15   # of Times Distance was Traveled 2   Deviation   (decreased akira, step length)   # of Stairs Climbed 0   Weight Bearing Status no restrictions   Vision Deficits Impacting " Mobility NT   Bed Mobility    Supine to Sit Supervised   Sit to Supine   (NT, left in chair)   Scooting Supervised   Functional Mobility   Sit to Stand Minimal Assist   Bed, Chair, Wheelchair Transfer Minimal Assist   Transfer Method Stand Step   ICU Target Mobility Level   ICU Mobility - Targeted Level Level 4   How much difficulty does the patient currently have...   Turning over in bed (including adjusting bedclothes, sheets and blankets)? 4   Sitting down on and standing up from a chair with arms (e.g., wheelchair, bedside commode, etc.) 3   Moving from lying on back to sitting on the side of the bed? 3   How much help from another person does the patient currently need...   Moving to and from a bed to a chair (including a wheelchair)? 3   Need to walk in a hospital room? 3   Climbing 3-5 steps with a railing? 3   6 clicks Mobility Score 19   Activity Tolerance   Sitting in Chair left in chair   Sitting Edge of Bed 5 min   Standing 8 min   Comments no overt pain, fatigue, SOB, dizziness. Limited by PT given cognition   Edema / Skin Assessment   Edema / Skin  Not Assessed   Patient / Family Goals    Patient / Family Goal #1 not able to state   Short Term Goals    Short Term Goal # 1 Patient will perform transfers with supervision within 6tx in order to achieve functional transfer and progress independence   Short Term Goal # 2 Patient will ambulate 150ft with supervision within 6tx in order to access environment at [facility]   Education Group   Education Provided Role of Physical Therapist   Role of Physical Therapist Patient Response Patient;Acceptance;Explanation;No Learning Evidence   Problem List    Problems Impaired Transfers;Impaired Ambulation;Decreased Activity Tolerance;Safety Awareness Deficits / Cognition   Anticipated Discharge Equipment and Recommendations   DC Equipment Recommendations Unable to determine at this time   Discharge Recommendations   (defer to other disciplines, anticipate progress with  mobilit)   Interdisciplinary Plan of Care Collaboration   IDT Collaboration with  Nursing;Occupational Therapist   Patient Position at End of Therapy Seated;Chair Alarm On;Call Light within Reach;Tray Table within Reach;Phone within Reach   Collaboration Comments RN aware of visit, response   Session Information   Date / Session Number  2/22-1 (1/3, 2/28)   Priority 2

## 2021-02-22 NOTE — ASSESSMENT & PLAN NOTE
Likely secondary to methamphetamine use  Lasix 20mg IV daily, lisinopril 5mg daily, coreg 6.25mg BID  Blood pressure tolerates diuresis start low-dose ACE inhibitor and beta-blocker and titrate as tolerated  She will need follow-up with cardiology after she recovers from her acute hospitalization for further work-up and monitoring

## 2021-02-22 NOTE — DISCHARGE PLANNING
[facility] Rehabilitation Transitional Care Coordination     Referral from:  Dr. Mynor Jean Baptiste  Facesheet indicates: [employer]  Potential Rehab Diagnosis: CVA    Chart review indicates patient has on going medical management, anticipate therapy needs to possibly meet inpatient rehab facility criteria with the goal of returning to community.    D/C support:  Significant Other - to be verified     Physiatry to consult.  Right-sided weakness.  Left M 1 occlusion - received tPA.  S/P mechanical IR thrombectomy.  Would welcome PT/OT as clinically appropriate.      Last Covid test date:  2/20/2021 - COVID negative      Thank you for the referral.

## 2021-02-23 LAB
ANION GAP SERPL CALC-SCNC: 9 MMOL/L (ref 7–16)
BASOPHILS # BLD AUTO: 0.6 % (ref 0–1.8)
BASOPHILS # BLD: 0.04 K/UL (ref 0–0.12)
BUN SERPL-MCNC: 12 MG/DL (ref 8–22)
CALCIUM SERPL-MCNC: 8.9 MG/DL (ref 8.5–10.5)
CHLORIDE SERPL-SCNC: 105 MMOL/L (ref 96–112)
CO2 SERPL-SCNC: 26 MMOL/L (ref 20–33)
CREAT SERPL-MCNC: 0.65 MG/DL (ref 0.5–1.4)
EOSINOPHIL # BLD AUTO: 0.13 K/UL (ref 0–0.51)
EOSINOPHIL NFR BLD: 1.8 % (ref 0–6.9)
ERYTHROCYTE [DISTWIDTH] IN BLOOD BY AUTOMATED COUNT: 48.7 FL (ref 35.9–50)
GLUCOSE SERPL-MCNC: 100 MG/DL (ref 65–99)
HCT VFR BLD AUTO: 35.8 % (ref 37–47)
HGB BLD-MCNC: 11.4 G/DL (ref 12–16)
IMM GRANULOCYTES # BLD AUTO: 0.02 K/UL (ref 0–0.11)
IMM GRANULOCYTES NFR BLD AUTO: 0.3 % (ref 0–0.9)
LYMPHOCYTES # BLD AUTO: 1.76 K/UL (ref 1–4.8)
LYMPHOCYTES NFR BLD: 24.8 % (ref 22–41)
MAGNESIUM SERPL-MCNC: 2.1 MG/DL (ref 1.5–2.5)
MCH RBC QN AUTO: 30.4 PG (ref 27–33)
MCHC RBC AUTO-ENTMCNC: 31.8 G/DL (ref 33.6–35)
MCV RBC AUTO: 95.5 FL (ref 81.4–97.8)
MONOCYTES # BLD AUTO: 0.67 K/UL (ref 0–0.85)
MONOCYTES NFR BLD AUTO: 9.4 % (ref 0–13.4)
NEUTROPHILS # BLD AUTO: 4.48 K/UL (ref 2–7.15)
NEUTROPHILS NFR BLD: 63.1 % (ref 44–72)
NRBC # BLD AUTO: 0 K/UL
NRBC BLD-RTO: 0 /100 WBC
PLATELET # BLD AUTO: 312 K/UL (ref 164–446)
PMV BLD AUTO: 10.1 FL (ref 9–12.9)
POTASSIUM SERPL-SCNC: 4.1 MMOL/L (ref 3.6–5.5)
RBC # BLD AUTO: 3.75 M/UL (ref 4.2–5.4)
SODIUM SERPL-SCNC: 140 MMOL/L (ref 135–145)
WBC # BLD AUTO: 7.1 K/UL (ref 4.8–10.8)

## 2021-02-23 PROCEDURE — A9270 NON-COVERED ITEM OR SERVICE: HCPCS | Performed by: INTERNAL MEDICINE

## 2021-02-23 PROCEDURE — 770020 HCHG ROOM/CARE - TELE (206)

## 2021-02-23 PROCEDURE — 700102 HCHG RX REV CODE 250 W/ 637 OVERRIDE(OP): Performed by: HOSPITALIST

## 2021-02-23 PROCEDURE — 700102 HCHG RX REV CODE 250 W/ 637 OVERRIDE(OP): Performed by: INTERNAL MEDICINE

## 2021-02-23 PROCEDURE — 85025 COMPLETE CBC W/AUTO DIFF WBC: CPT

## 2021-02-23 PROCEDURE — A9270 NON-COVERED ITEM OR SERVICE: HCPCS | Performed by: HOSPITALIST

## 2021-02-23 PROCEDURE — 700102 HCHG RX REV CODE 250 W/ 637 OVERRIDE(OP): Performed by: NURSE PRACTITIONER

## 2021-02-23 PROCEDURE — A9270 NON-COVERED ITEM OR SERVICE: HCPCS | Performed by: NURSE PRACTITIONER

## 2021-02-23 PROCEDURE — 700111 HCHG RX REV CODE 636 W/ 250 OVERRIDE (IP): Performed by: HOSPITALIST

## 2021-02-23 PROCEDURE — 80048 BASIC METABOLIC PNL TOTAL CA: CPT

## 2021-02-23 PROCEDURE — 83735 ASSAY OF MAGNESIUM: CPT

## 2021-02-23 PROCEDURE — 99254 IP/OBS CNSLTJ NEW/EST MOD 60: CPT | Mod: GC | Performed by: INTERNAL MEDICINE

## 2021-02-23 PROCEDURE — 96105 ASSESSMENT OF APHASIA: CPT

## 2021-02-23 PROCEDURE — 99232 SBSQ HOSP IP/OBS MODERATE 35: CPT | Performed by: HOSPITALIST

## 2021-02-23 RX ORDER — LISINOPRIL 5 MG/1
5 TABLET ORAL
Status: DISCONTINUED | OUTPATIENT
Start: 2021-02-23 | End: 2021-02-25 | Stop reason: HOSPADM

## 2021-02-23 RX ORDER — CARVEDILOL 6.25 MG/1
6.25 TABLET ORAL 2 TIMES DAILY WITH MEALS
Status: DISCONTINUED | OUTPATIENT
Start: 2021-02-23 | End: 2021-02-24

## 2021-02-23 RX ADMIN — ATORVASTATIN CALCIUM 80 MG: 80 TABLET, FILM COATED ORAL at 17:22

## 2021-02-23 RX ADMIN — DOCUSATE SODIUM 50 MG AND SENNOSIDES 8.6 MG 2 TABLET: 8.6; 5 TABLET, FILM COATED ORAL at 17:21

## 2021-02-23 RX ADMIN — ENOXAPARIN SODIUM 40 MG: 40 INJECTION SUBCUTANEOUS at 06:18

## 2021-02-23 RX ADMIN — CARVEDILOL 6.25 MG: 6.25 TABLET, FILM COATED ORAL at 17:22

## 2021-02-23 RX ADMIN — FUROSEMIDE 20 MG: 10 INJECTION, SOLUTION INTRAMUSCULAR; INTRAVENOUS at 06:17

## 2021-02-23 RX ADMIN — ASPIRIN 325 MG: 325 TABLET, FILM COATED ORAL at 06:18

## 2021-02-23 RX ADMIN — POTASSIUM CHLORIDE 20 MEQ: 1500 TABLET, EXTENDED RELEASE ORAL at 06:18

## 2021-02-23 RX ADMIN — LISINOPRIL 5 MG: 5 TABLET ORAL at 15:53

## 2021-02-23 ASSESSMENT — ENCOUNTER SYMPTOMS
NAUSEA: 0
VOMITING: 0
BACK PAIN: 0
PALPITATIONS: 0
COUGH: 0
CHILLS: 0
STRIDOR: 0
SPEECH CHANGE: 0
DIARRHEA: 0
NERVOUS/ANXIOUS: 0
FEVER: 0
SHORTNESS OF BREATH: 0
SENSORY CHANGE: 0
SORE THROAT: 0
EYE DISCHARGE: 0
DIZZINESS: 0
ABDOMINAL PAIN: 0
HEADACHES: 0

## 2021-02-23 ASSESSMENT — PATIENT HEALTH QUESTIONNAIRE - PHQ9
1. LITTLE INTEREST OR PLEASURE IN DOING THINGS: NOT AT ALL
2. FEELING DOWN, DEPRESSED, IRRITABLE, OR HOPELESS: NOT AT ALL
SUM OF ALL RESPONSES TO PHQ9 QUESTIONS 1 AND 2: 0

## 2021-02-23 NOTE — THERAPY
Speech Language Pathology   Initial Assessment     Patient Name: Imani Stephen  AGE:  60 y.o., SEX:  female  Medical Record #: [id number]  Today's Date: 2/23/2021     Precautions  Precautions: Fall Risk, Swallow Precautions ( See Comments)  Comments: (P) Expressive aphasia     Assessment  Patient seen this date for aphasia evaluation. Patient with flat affect, but pleasant and cooperative during evaluation. Patient was administered the Bedside Western Aphasia Battery-Revised. Please see chart below for full details. Patient presents with non-fluent Broca's-type aphasia, evidenced by reduced verbal output, limited utterances, and language characterized by halting and moderate-severe word-finding difficulty. Patient also has minimal dysarthria as well, but this did not reduce intelligibility. Patient's strength was in receptive language, repeating short words and phrases, and naming objects and pictures. Patient had significant difficulty with generative naming tasks, evidenced by only being able to name 5 items from a given category in one minute (WNL is 20-25). Patient was able to read a paragraph, but had moderate deficits in reading comprehension and was able to write, but wrote address incomplete.     At this time, recommend patient continue acute and post-acute SLP services for aphasia. SLP is following for dysphagia and aphasia tx.     Plan  Recommend Speech Therapy 5 times per week until therapy goals are met for the following treatments:  Dysphagia Training, Comprehension Training, Expression Training, Cognitive-Linguistic Training and Patient / Family / Caregiver Education.    Discharge Recommendations: Recommend post-acute placement for additional speech therapy services prior to discharge home     Objective     02/23/21 1425   Precautions   Precautions Fall Risk;Swallow Precautions ( See Comments)   Comments Expressive aphasia    Vitals   O2 (LPM) 2   O2 Delivery Device Silicone Nasal Cannula   Verbal  Expression   Vocal Quality Clear   Verbal Output Automatic Within Functional Limits (6-7)   Verbal Output: Phrases Within Functional Limits (6-7)   Verbal Output Conversation Minimal (4)   Verbal Output Functional Moderate (3)   Repetition: Single Words Within Functional Limits (6-7)   Repetition: Phrases Minimal (4)   Repetition: Sentences Severe (2)   Naming Moderate (3)   Word Finding Deficits Moderate (3)   Auditory Comprehension   Yes / No Questions: Personal Information Within Functional Limits (6-7)   Yes / No Questions: General Information Within Functional Limits (6-7)   Yes / No Questions: Abstract Within Functional Limits (6-7)   Identifies Objects Within Functional Limits (6-7)   Identifies Pictures Within Functional Limits (6-7)   Identifies Body Parts Within Functional Limits (6-7)   Follows One Unit Commands Within Functional Limits (6-7)   Follows Two Unit Commands Moderate (3)   Follows Three Unit Commands Severe (2)   Understands Paragraph Not Tested   Understands Simple, Structured Conversation  Within Functional Limits (6-7)   Understands Complex Conversation Moderate (3)   Reading Comprehension   Reading Words Within Functional Limits (6-7)   Reading Phrases Within Functional Limits (6-7)   Reading Sentences Within Functional Limits (6-7)   Reading Short Paragraphs  Moderate (3)   Written Expression   Functional Writing: Name Within Functional Limits (6-7)   Functional Writing Dictation: Word Within Functional Limits (6-7)   Functional Writing to Dictation: Sentence Within Functional Limits (6-7)   Formulates: Sentence Within Functional Limits (6-7)   Overall Legibility Within Functional Limits (6-7)   Social / Pragmatic Communication   Comments Pleasant and cooperative, but flat affect    Aphasia Compensatory Strategies   Compensatory Strategies Used To Communicate Yes / No Responses;Response Elaboration;Limiting Utterance Lengths;Circumlocution   Outcome Measures   Outcome Measures Utilized  WAB-Bedside   WAB - Bedside (Western Aphasia Battery)   Spontaneous Speech: Content  6   Spontaneous Speech: Fluency 5   Auditory Comprehension 8   Sequential Commands 4   Repetition Score  7   Object Naming 10   Reading 7   Writing 6   Apraxia  8   Bedside Asphasia Score 66.67   Bedside Language Score 66.25   Short Term Goals   Short Term Goal # 2 Patient will name 10 items from a given category in one minute with moderate verbal cues from SLP.    Short Term Goal # 3 Patient will use strategies to describe a picture with fluent, meaningful speech.    Anticipated Discharge Needs   Discharge Recommendations Recommend post-acute placement for additional speech therapy services prior to discharge home

## 2021-02-23 NOTE — PROGRESS NOTES
Hospital Medicine Daily Progress Note    Date of Service  2/23/2021    Chief Complaint  Right side weakness and aphasia    Hospital Course  60 y.o. female w/ hx of methamphetamine abuse, Crohn's disease admitted 2/20/2021 with right sided weakness and difficulty with speech.  She received Alteplase and a left M1 thrombectomy. MRI brain from 2/21 confirms small to moderate left MCA acute infarct.  Echocardiogram showed an EF:25% w/ moderate MS.      Interval Problem Update  Awake and aware she is here due to CVA  Discussed smoking cessation  Emotionally labile and crying when I discussed cessation of methamphetamine and explaning her heart failure.    Consultants/Specialty  Neurology  Critical Care    Code Status  Full Code    Disposition  Rehab vs SNF  Transfer to neurology floor 2/23/2021    Review of Systems  Review of Systems   Constitutional: Negative for chills and fever.   HENT: Negative for sore throat.    Eyes: Negative for discharge.   Respiratory: Negative for cough, shortness of breath and stridor.    Cardiovascular: Negative for chest pain, palpitations and leg swelling.   Gastrointestinal: Negative for abdominal pain, diarrhea, nausea and vomiting.   Genitourinary: Negative for dysuria and hematuria.   Musculoskeletal: Negative for back pain and joint pain.   Neurological: Negative for dizziness, sensory change, speech change and headaches.   Psychiatric/Behavioral: The patient is not nervous/anxious.         Physical Exam  Temp:  [36.5 °C (97.7 °F)-37 °C (98.6 °F)] 37 °C (98.6 °F)  Pulse:  [] 94  Resp:  [12-26] 18  BP: (120-168)/(66-96) 145/85  SpO2:  [95 %-100 %] 99 %    Physical Exam  Vitals reviewed.   Constitutional:       Appearance: Normal appearance. She is not diaphoretic.   HENT:      Head: Normocephalic and atraumatic.      Nose: Nose normal.      Mouth/Throat:      Mouth: Mucous membranes are moist.      Pharynx: No oropharyngeal exudate.   Eyes:      General: No scleral icterus.         Right eye: No discharge.         Left eye: No discharge.      Extraocular Movements: Extraocular movements intact.      Conjunctiva/sclera: Conjunctivae normal.      Pupils: Pupils are equal, round, and reactive to light.   Cardiovascular:      Rate and Rhythm: Normal rate and regular rhythm.      Pulses:           Radial pulses are 2+ on the right side and 2+ on the left side.        Dorsalis pedis pulses are 2+ on the right side and 2+ on the left side.      Heart sounds: Murmur present.   Pulmonary:      Effort: Pulmonary effort is normal. No respiratory distress.      Breath sounds: Normal breath sounds. No wheezing or rales.   Abdominal:      General: Bowel sounds are normal. There is no distension.      Palpations: Abdomen is soft.   Musculoskeletal:         General: No swelling or tenderness.      Cervical back: No muscular tenderness.      Right lower leg: No edema.      Left lower leg: No edema.   Lymphadenopathy:      Cervical: No cervical adenopathy.   Skin:     Coloration: Skin is not jaundiced or pale.   Neurological:      General: No focal deficit present.      Mental Status: She is alert and oriented to person, place, and time. Mental status is at baseline.      Cranial Nerves: Cranial nerve deficit present.   Psychiatric:         Mood and Affect: Mood normal.         Behavior: Behavior normal.         Fluids    Intake/Output Summary (Last 24 hours) at 2/23/2021 1508  Last data filed at 2/23/2021 1300  Gross per 24 hour   Intake 50 ml   Output 2800 ml   Net -2750 ml       Laboratory  Recent Labs     02/20/21  1551 02/22/21  0430 02/23/21  0410   WBC 7.0 7.3 7.1   RBC 3.93* 3.60* 3.75*   HEMOGLOBIN 11.8* 10.9* 11.4*   HEMATOCRIT 38.0 34.7* 35.8*   MCV 96.7 96.4 95.5   MCH 30.0 30.3 30.4   MCHC 31.1* 31.4* 31.8*   RDW 51.4* 49.5 48.7   PLATELETCT 443 280 312   MPV 10.3 10.1 10.1     Recent Labs     02/20/21  1551 02/22/21  0430 02/23/21  0410   SODIUM 139 136 140   POTASSIUM 4.0 3.6 4.1   CHLORIDE 106  103 105   CO2 21 25 26   GLUCOSE 122* 97 100*   BUN 18 14 12   CREATININE 0.78 0.69 0.65   CALCIUM 8.6 9.1 8.9     Recent Labs     02/20/21  1551   APTT 26.9   INR 1.19*         Recent Labs     02/20/21  1551   TRIGLYCERIDE 113   HDL 36*   *       Imaging  MR-BRAIN-W/O   Final Result      1.  Small to moderate sized acute infarct involving the left MCA territory with no evidence of hemorrhagic transformation.   2.  Mild diffuse cerebral substance loss.   3.  Mild microangiopathic ischemic change versus demyelination or gliosis.      [id number] VIEW   Final Result      No evidence of an implanted electronic device within the abdomen or pelvis which would preclude MRI scanning.      DX-CHEST-PORTABLE (1 VIEW)   Final Result      Bilateral interstitial infiltrates.      EC-ECHOCARDIOGRAM COMPLETE W/O CONT   Final Result      IR-THROMBECTOMY ARTERY SECONDARY   Final Result      1.  Occluded left M1 segment.   2.  Mechanical thrombectomy.   3.  TICI 2b flow.      CT-CEREBRAL PERFUSION ANALYSIS   Final Result      1.  Cerebral blood flow less than 30% likely representing completed infarct = 0 mL.      2.  T Max more than 6 seconds likely representing combination of completed infarct and ischemia = 142 mL.      3.  Mismatched volume likely representing ischemic brain/penumbra = 142 mL      4.  Please note that the cerebral perfusion was performed on the limited brain tissue around the basal ganglia region. Infarct/ischemia outside the CT perfusion sections can be missed in this study.      CT-CTA NECK WITH & W/O-POST PROCESSING   Final Result      1.  Atherosclerotic disease as described. Resulting stenosis is less than 50%.      2.  Bilateral pleural effusions layer posteriorly. Adjacent airspace disease likely atelectasis.      CT-CTA HEAD WITH & W/O-POST PROCESS   Final Result      Occlusion of the left M1 artery.      BRITTANY YE was aware of this abnormality at 4:15 PM on 2/20/2021.      CT-HEAD W/O   Final  Result      1.  No acute intracranial findings.      2.  Mild diffuse atrophy. Periventricular white matter changes consistent with chronic small vessel disease.              Assessment/Plan  * CVA (cerebral vascular accident) ([facility])- (present on admission)  Assessment & Plan  Status post alteplase and thrombectomy left M1 with  TICI 2 b  Continue telemetry monitoring   Apixaban 5mg BID  Neurology recommended cardiology eval for loop recorder Dr. Stein contacted recommended continue telemetry monitoring as inpatient and Holter monitor on discharge  PT/OT/SLP  Counseled on methamphetamine cessation and smoking cessation    Abnormal chest CT- (present on admission)  Assessment & Plan  2/11/2021 Bilateral pleural effusions on CT abd/pelvis  Diurese as  tolerated    Abnormal echocardiogram- (present on admission)  Assessment & Plan  2/20/2021 Echo:   Normal left ventricular chamber size.  Normal left ventricular wall thickness.  Severely reduced left ventricular systolic function.  Left ventricular ejection fraction is visually estimated to be 25%.  Mild mitral regurgitation.  Moderate mitral stenosis.  Aortic sclerosis without stenosis.  No evidence of right to left shunt by agitated saline challenge.    Pleural effusion, bilateral- (present on admission)  Assessment & Plan  Likely related to her cardiomyopathy  Lasix    Mitral stenosis  Assessment & Plan  Need outpatient follow-up with cardiology    Cardiomyopathy ([facility])  Assessment & Plan  Likely secondary to methamphetamine use  Lasix 20mg IV daily, lisinopril 5mg daily, coreg 6.25mg BID  Blood pressure tolerates diuresis start low-dose ACE inhibitor and beta-blocker and titrate as tolerated  She will need follow-up with cardiology after she recovers from her acute hospitalization for further work-up and monitoring    Acute cystitis without hematuria- (present on admission)  Assessment & Plan  Pyuria patient is asymptomatic  Monitor off antibiotics  Normal WBC,  afebrile as of 2/23    Tobacco dependence  Assessment & Plan  Cessation encouraged 2/23    Methamphetamine abuse ([facility])- (present on admission)  Assessment & Plan  Counseled on cessation    Crohn disease ([facility])- (present on admission)  Assessment & Plan  Patient has known disease.    Vitamin D deficiency  Assessment & Plan  P.o. repletion       VTE prophylaxis: lovenox 40mg daily

## 2021-02-23 NOTE — CONSULTS
Cardiology New Consult Note    Date of note:    2/23/2021      Patient ID:    Name:   Imani Stephen   YOB: 1960  Age:   60 y.o.  female   MRN:   [id number]      Consult question: Anticoagulation and possible A-fib    HPI:  Imani Stephen is a 60 -year-old female with hx of crohn's disease, tobacco use, anxiety and depression admitted 2/20/21 for right sided weakness, and dysphagia found to have a stroke. Patient was treated with TPA after a left MCA thrombus was found on MRI. Mechanical thrombectomy was done on day of admission. Patient was admitted to ICU. Echocardiogram showed EF of 25% and mitral regurgitation. Because of concern for cardioembolic stroke, anticoagulation was initiated with Eliquis and cardiology was consulted. Patient has been in sinus rhythm throughout her hospitalization. Patient has no signs of heart failure, denies shortness of breath, and denies lower extremity swelling. She denies palpitations, abnormal heart rhythms, or chest pain. Her drug screen on admission was positive for methamphetamines.    ROS: All others reviewed and negative.    PMH   Crohn's Disease  Anxiety  Depression    Outpatient Medications:     Medications Prior to Admission   Medication Sig Dispense Refill Last Dose   • ondansetron (ZOFRAN ODT) 4 MG TABLET DISPERSIBLE Take 1 tablet by mouth every 6 hours as needed. (Patient not taking: Reported on 2/20/2021) 8 tablet 0 Not Taking at Unknown time   • cyclobenzaprine (FLEXERIL) 10 mg Tab Take 1 Tab by mouth 3 times a day as needed. (Patient not taking: Reported on 2/20/2021) 30 Tab 0 Not Taking at Unknown time     Allergies: Codeine and Pcn [penicillins]    Family History: family history includes Cancer in her sister; Heart Disease in her sister; Hypertension in her father and mother; Other in her daughter.    Social History:  reports that she has been smoking cigarettes. She has a 17.50 pack-year smoking history. She has never used smokeless tobacco. She  "reports that she does not drink alcohol and does not use drugs. Patient denies drug use or alcohol use, but UDS on admission was positive for methamphetamines.    Physical Exam    Body mass index is 21.26 kg/m².  /95   Pulse 94   Temp 37 °C (98.6 °F) (Temporal)   Resp 12   Ht 1.549 m (5' 0.98\")   Wt 51 kg (112 lb 7 oz)   SpO2 96%   Vitals:    02/23/21 0500 02/23/21 0600 02/23/21 0700 02/23/21 0800   BP: 147/89 130/82 143/96 142/95   Pulse: 90 98 93 94   Resp: 12 16 18 12   Temp:  36.9 °C (98.4 °F)  37 °C (98.6 °F)   TempSrc:  Temporal  Temporal   SpO2: 97% 96% 95% 96%   Weight:       Height:         Oxygen Therapy:  Pulse Oximetry: 96 %, O2 (LPM): 2, O2 Delivery Device: Silicone Nasal Cannula    General: thin female resting in bed  HEENT: No jugular venous distension sitting upright, pupils equal, round and reactive to light, extraocular movements intact  Heart: Normal rate, regular rhythm, systolic murmur  Lungs: Clear to auscultation bilaterally  Abdomen: Bowel sounds positive, non tender, non distended, no masses   Extremities: No lower extremity edema, no clubbing, no cyanosis  Neurological: Alert and oriented x 3, expressive aphasia  Skin: no rashes    Labs (reviewed and notable for):     CBC, 2/23/2021 remarkable for hemoglobin of 11.4  Chemistry panel, 2/23/2021 unremarkable    Echo: 2/20/21   Normal left ventricular chamber size.  Normal left ventricular wall thickness.  Severely reduced left ventricular systolic function.  Left ventricular ejection fraction is visually estimated to be 25%.  Mild mitral regurgitation.  Moderate mitral stenosis.  Aortic sclerosis without stenosis.  No evidence of right to left shunt by agitated saline challenge.    CXR:  2/21/21: Bilateral interstitial infiltrates.    MRI Brain 2/21: Small to moderate sized acute infarct involving the left MCA territory with no evidence of hemorrhagic transformation.  Mild diffuse cerebral substance loss. Mild microangiopathic " ischemic change versus demyelination or gliosis.    IR Thrombectomy 21: Occluded left M1 segment.  Mechanical thrombectomy. TICI 2b flow.     Telemetry: Sinus rhythm    Impression and Medical Decision Makin. Left MCA stroke, likely Embolic  2. Systolic Heart failure, EF 25%  Ms. Stephen is a 61 yo female admitted for left MCA stroke. She was treated with TPA and thrombectomy. Stroke was likley to be cardio-embolic due to the clinical picture and the angiogram findings. Patient with systolic heart failure with EF of 25% which puts her at risk for atrial fibrillation. Heart failure is likely due to methamphetamine use. Because of the likely rivera of cardioembolic stroke, will start on Eliquis for stroke prevention when neurology allows for anticoagulation. There is no need for a loop recorder or Holter monitor at this time because patient is being treated with anticoagulation. Recommend repeating echocardiogram in 2-3 months outpatient to monitor for improvement in EF. Discussed the importance of stopping methamphetamine use with the patient. Follow up in cardiology outpatient office in 1 month. Please start ACE/ARB when patient's BP allows.     Plan:  - start Eliquis  - No need for loop recorder or Holter monitor  - start ACE/ARB when BP allows  - repeat echo in 2-3 months   - Follow up with cardiology clinic in 1 month  - Methamphetamine cessation       Thank you for allowing me to participate in the care of this patient.  Please call if any clarification will be helpful.    Ismael Garay MD, PGY-2    Case discussed with the attending cardiologist, Dr. Stein.

## 2021-02-23 NOTE — CARE PLAN
Problem: Safety  Goal: Will remain free from falls  Outcome: PROGRESSING AS EXPECTED  Intervention: Implement fall precautions  Flowsheets  Taken 2/22/2021 2259  Bed Alarm: Yes - Alarm On  Taken 2/22/2021 2200  Environmental Precautions:   Treaded Slipper Socks on Patient   Personal Belongings, Wastebasket, Call Bell etc. in Easy Reach   Report Given to Other Health Care Providers Regarding Fall Risk   Bed in Low Position   Communication Sign for Patients & Families   Mobility Assessed & Appropriate Sign Placed     Problem: Communication  Goal: The ability to communicate needs accurately and effectively will improve  Outcome: PROGRESSING SLOWER THAN EXPECTED  Intervention: Educate patient and significant other/support system about the plan of care, procedures, treatments, medications and allow for questions  Flowsheets (Taken 2/22/2021 2259)  Pt & Family Have Been Educated on Methods Available to Report Concerns Related to Care, Treatment, Services, and Patient Safety Issues: Yes  Note: Slight global aphasia present. Patient oriented x2. Intermittently follows commands. Updated on plan of care. Reinforcement needed

## 2021-02-23 NOTE — PROGRESS NOTES
Pt arrived to unit, oriented to room. Pt axo x3, updated on POC. Tele monitor placed, confirmed with Millie in monitor room. NIHSS of 4.

## 2021-02-23 NOTE — PROGRESS NOTES
2 RN skin check   Preformed with DAYDAY Ramsey groin site with dressing clean dry and soft. BL heels dry and cracking. BL knees dry. Sacrum red and blanching. Education provided on activity and mobilization encouraged.

## 2021-02-23 NOTE — PROGRESS NOTES
Brief Neurology Note    The patient's chart has been reviewed. Case discussed with Dr. Marianne Moore, Dr. Francois, and Dr. Sanjay Almaguer.     Patient received IV TPA at 1615 on 2/20/21 and underwent left MCA thrombectomy with TICI 2b reperfusion on 2/20/21. MRI brain without contrast on 2/21/21 revealed a moderate sized acute infarct involving the left MCA territory with no hemorrhagic transformation.  Etiology of stroke is likely cardioembolic with concomitant cardiomyopathy secondary to methamphetamine abuse given TTE with bubble study revealed EF 25%, severely dilated left atrium, but no shunt.    I discussed the TTE findings and likelihood that the etiology of the ischemic stroke was cardioembolic given her reduced EF, cardiomyopathy, and methamphetamine abuse with Cardiologist Dr. Francois. Dr. Francois stated anticoagulation was indicated from a cardiology standpoint.     Updated recommendations:  -Discontinue ASA and enoxaparin SQ  -Eliquis 5mg PO BID, first dose tomorrow morning given moderate sized infarct with no hemorrhagic transformation  -No other changes to previously documented plan in progress note dated 2/22/21    No further recommendations or further studies from a neurological standpoint at this time. Please re-consult if you have further questions or there is a change in status.    Jemal Castellanos, MILLY.P.R.N.   Nurse Practitioner, Neurohospitalist  [facility]  t) [phone] (f) [phone]

## 2021-02-23 NOTE — DISCHARGE PLANNING
[facility]/  Transitional Care Coordination  • Discussed receiving referral for [facility]hab [facility] Consult spoke with Imani who provided the below mentioned information:        Prior level of function:   OK independent     Home living situation:     [facility] [address]    Prior services:   None     DME at home:   None     Patient/family goals:  Return home     Level of assistance available from family/support systems:   Per Imani SO is able to assist     Patient goal   RACHEL with mobility and self care SO can drive.       Discussed option for post acute services [place] was agreeable to Home Health if needed as her number one choice. Will follow for medical clearance, and need for post acute services. At this time Imani is not interested in an inpatient post acute program.      Discussed with the following:     SOPHIE/RAY

## 2021-02-23 NOTE — ASSESSMENT & PLAN NOTE
2/20/2021 Echo:   Normal left ventricular chamber size.  Normal left ventricular wall thickness.  Severely reduced left ventricular systolic function.  Left ventricular ejection fraction is visually estimated to be 25%.  Mild mitral regurgitation.  Moderate mitral stenosis.  Aortic sclerosis without stenosis.  No evidence of right to left shunt by agitated saline challenge.

## 2021-02-24 PROBLEM — R47.01 APHASIA: Status: ACTIVE | Noted: 2021-02-24

## 2021-02-24 PROBLEM — J90 PLEURAL EFFUSION, BILATERAL: Status: RESOLVED | Noted: 2021-02-20 | Resolved: 2021-02-24

## 2021-02-24 PROBLEM — E04.2 MULTIPLE THYROID NODULES: Status: ACTIVE | Noted: 2021-02-24

## 2021-02-24 PROCEDURE — A9270 NON-COVERED ITEM OR SERVICE: HCPCS | Performed by: HOSPITALIST

## 2021-02-24 PROCEDURE — 770020 HCHG ROOM/CARE - TELE (206)

## 2021-02-24 PROCEDURE — A9270 NON-COVERED ITEM OR SERVICE: HCPCS | Performed by: INTERNAL MEDICINE

## 2021-02-24 PROCEDURE — 700102 HCHG RX REV CODE 250 W/ 637 OVERRIDE(OP): Performed by: INTERNAL MEDICINE

## 2021-02-24 PROCEDURE — 97116 GAIT TRAINING THERAPY: CPT

## 2021-02-24 PROCEDURE — 97530 THERAPEUTIC ACTIVITIES: CPT

## 2021-02-24 PROCEDURE — 99232 SBSQ HOSP IP/OBS MODERATE 35: CPT | Performed by: STUDENT IN AN ORGANIZED HEALTH CARE EDUCATION/TRAINING PROGRAM

## 2021-02-24 PROCEDURE — 94760 N-INVAS EAR/PLS OXIMETRY 1: CPT

## 2021-02-24 PROCEDURE — 97535 SELF CARE MNGMENT TRAINING: CPT | Mod: CO

## 2021-02-24 PROCEDURE — 700102 HCHG RX REV CODE 250 W/ 637 OVERRIDE(OP): Performed by: HOSPITALIST

## 2021-02-24 PROCEDURE — 700102 HCHG RX REV CODE 250 W/ 637 OVERRIDE(OP): Performed by: NURSE PRACTITIONER

## 2021-02-24 PROCEDURE — 92526 ORAL FUNCTION THERAPY: CPT

## 2021-02-24 PROCEDURE — 700111 HCHG RX REV CODE 636 W/ 250 OVERRIDE (IP): Performed by: HOSPITALIST

## 2021-02-24 PROCEDURE — 99232 SBSQ HOSP IP/OBS MODERATE 35: CPT | Mod: GC | Performed by: INTERNAL MEDICINE

## 2021-02-24 PROCEDURE — A9270 NON-COVERED ITEM OR SERVICE: HCPCS | Performed by: NURSE PRACTITIONER

## 2021-02-24 RX ORDER — CARVEDILOL 6.25 MG/1
3.12 TABLET ORAL 2 TIMES DAILY WITH MEALS
Status: DISCONTINUED | OUTPATIENT
Start: 2021-02-24 | End: 2021-02-25 | Stop reason: HOSPADM

## 2021-02-24 RX ADMIN — DOCUSATE SODIUM 50 MG AND SENNOSIDES 8.6 MG 2 TABLET: 8.6; 5 TABLET, FILM COATED ORAL at 18:14

## 2021-02-24 RX ADMIN — FUROSEMIDE 20 MG: 10 INJECTION, SOLUTION INTRAMUSCULAR; INTRAVENOUS at 05:23

## 2021-02-24 RX ADMIN — APIXABAN 5 MG: 5 TABLET, FILM COATED ORAL at 05:23

## 2021-02-24 RX ADMIN — LISINOPRIL 5 MG: 5 TABLET ORAL at 05:22

## 2021-02-24 RX ADMIN — ATORVASTATIN CALCIUM 80 MG: 80 TABLET, FILM COATED ORAL at 18:14

## 2021-02-24 RX ADMIN — APIXABAN 5 MG: 5 TABLET, FILM COATED ORAL at 18:14

## 2021-02-24 RX ADMIN — POTASSIUM CHLORIDE 20 MEQ: 1500 TABLET, EXTENDED RELEASE ORAL at 05:23

## 2021-02-24 RX ADMIN — CARVEDILOL 6.25 MG: 6.25 TABLET, FILM COATED ORAL at 09:10

## 2021-02-24 ASSESSMENT — COGNITIVE AND FUNCTIONAL STATUS - GENERAL
CLIMB 3 TO 5 STEPS WITH RAILING: A LITTLE
HELP NEEDED FOR BATHING: A LITTLE
WALKING IN HOSPITAL ROOM: A LITTLE
MOVING FROM LYING ON BACK TO SITTING ON SIDE OF FLAT BED: A LITTLE
DRESSING REGULAR LOWER BODY CLOTHING: A LITTLE
STANDING UP FROM CHAIR USING ARMS: A LITTLE
PERSONAL GROOMING: A LITTLE
DRESSING REGULAR UPPER BODY CLOTHING: A LITTLE
SUGGESTED CMS G CODE MODIFIER MOBILITY: CK
MOBILITY SCORE: 19
MOVING TO AND FROM BED TO CHAIR: A LITTLE
TOILETING: A LITTLE
DAILY ACTIVITIY SCORE: 19
SUGGESTED CMS G CODE MODIFIER DAILY ACTIVITY: CK

## 2021-02-24 ASSESSMENT — ENCOUNTER SYMPTOMS
BACK PAIN: 0
SENSORY CHANGE: 0
COUGH: 0
PALPITATIONS: 0
CHILLS: 0
HEADACHES: 0
ABDOMINAL PAIN: 0
EYE DISCHARGE: 0
SPEECH CHANGE: 0
FEVER: 0
NAUSEA: 0
SORE THROAT: 0
VOMITING: 0
DIZZINESS: 0
FOCAL WEAKNESS: 1
SHORTNESS OF BREATH: 0
DIARRHEA: 0
NERVOUS/ANXIOUS: 0
STRIDOR: 0

## 2021-02-24 ASSESSMENT — GAIT ASSESSMENTS
GAIT LEVEL OF ASSIST: MINIMAL ASSIST
DISTANCE (FEET): 25

## 2021-02-24 NOTE — CARE PLAN
Problem: Communication  Goal: The ability to communicate needs accurately and effectively will improve  Outcome: PROGRESSING AS EXPECTED  Note: Educated pt on stroke POC, medications, and answered any questions the pt had. Reinforced the use of the call light. Encouraged pt to voice any concerns or needs. Will continue to monitor.        Problem: Safety  Goal: Will remain free from injury  Note: Educated pt on stroke POC and fall risk. Assessed pts understanding of using call light for assistance. Education for stroke provided. Fall precautions in place. Call light within reach, appropriate signage placed, treaded socks and bed alarm on.

## 2021-02-24 NOTE — PROGRESS NOTES
Hospital Medicine Daily Progress Note    Date of Service  2/24/2021    Chief Complaint  Right side weakness and aphasia    Hospital Course  60 y.o. female w/ hx of methamphetamine abuse, Crohn's disease admitted 2/20/2021 with right sided weakness and difficulty with speech.  She received Alteplase and a left M1 thrombectomy. MRI brain from 2/21 confirms small to moderate left MCA acute infarct.  Echocardiogram showed an EF:25% w/ moderate MS.    Transfer to neurology floor 2/23/2021    Interval Problem Update  I evaluated and examined the patient at the bedside.  Labs and imaging were reviewed. Care plan was discussed with the patient and bedside nurse.    No overnight events. patient was seen sitting on the chair and having lunch.  Pending discharge to SNF.    carvedilol decreased to 3.125 mg BID, up-titrate every 1-2 weeks as BP allows per cardiology.  Spironolactone is not started due to BP in the lower range.    Consultants/Specialty  Neurology  Critical Care    Code Status  Full Code    Disposition  SNF    Review of Systems  Review of Systems   Constitutional: Negative for chills and fever.   HENT: Negative for sore throat.    Eyes: Negative for discharge.   Respiratory: Negative for cough, shortness of breath and stridor.    Cardiovascular: Negative for chest pain, palpitations and leg swelling.   Gastrointestinal: Negative for abdominal pain, diarrhea, nausea and vomiting.   Genitourinary: Negative for dysuria and hematuria.   Musculoskeletal: Negative for back pain and joint pain.   Neurological: Negative for dizziness, sensory change, speech change and headaches.   Psychiatric/Behavioral: The patient is not nervous/anxious.         Physical Exam  Temp:  [36.1 °C (97 °F)-36.8 °C (98.2 °F)] 36.7 °C (98 °F)  Pulse:  [] 87  Resp:  [16-18] 16  BP: ()/(49-83) 110/71  SpO2:  [94 %-99 %] 96 %    Physical Exam  Vitals reviewed.   Constitutional:       Appearance: Normal appearance. She is not  diaphoretic.   HENT:      Head: Normocephalic and atraumatic.      Nose: Nose normal.      Mouth/Throat:      Mouth: Mucous membranes are moist.      Pharynx: No oropharyngeal exudate.   Eyes:      General: No scleral icterus.        Right eye: No discharge.         Left eye: No discharge.      Extraocular Movements: Extraocular movements intact.      Conjunctiva/sclera: Conjunctivae normal.      Pupils: Pupils are equal, round, and reactive to light.   Cardiovascular:      Rate and Rhythm: Normal rate and regular rhythm.      Pulses:           Radial pulses are 2+ on the right side and 2+ on the left side.        Dorsalis pedis pulses are 2+ on the right side and 2+ on the left side.      Heart sounds: Murmur present.   Pulmonary:      Effort: Pulmonary effort is normal. No respiratory distress.      Breath sounds: Normal breath sounds. No wheezing or rales.   Abdominal:      General: Bowel sounds are normal. There is no distension.      Palpations: Abdomen is soft.   Musculoskeletal:         General: No swelling or tenderness.      Cervical back: No muscular tenderness.      Right lower leg: No edema.      Left lower leg: No edema.   Lymphadenopathy:      Cervical: No cervical adenopathy.   Skin:     Coloration: Skin is not jaundiced or pale.   Neurological:      General: No focal deficit present.      Mental Status: She is alert and oriented to person, place, and time. Mental status is at baseline.      Cranial Nerves: Cranial nerve deficit present.   Psychiatric:         Mood and Affect: Mood normal.         Behavior: Behavior normal.         Fluids  No intake or output data in the 24 hours ending 02/24/21 1427    Laboratory  Recent Labs     02/22/21  0430 02/23/21  0410   WBC 7.3 7.1   RBC 3.60* 3.75*   HEMOGLOBIN 10.9* 11.4*   HEMATOCRIT 34.7* 35.8*   MCV 96.4 95.5   MCH 30.3 30.4   MCHC 31.4* 31.8*   RDW 49.5 48.7   PLATELETCT 280 312   MPV 10.1 10.1     Recent Labs     02/22/21  0430 02/23/21  0410   SODIUM  136 140   POTASSIUM 3.6 4.1   CHLORIDE 103 105   CO2 25 26   GLUCOSE 97 100*   BUN 14 12   CREATININE 0.69 0.65   CALCIUM 9.1 8.9                   Imaging  MR-BRAIN-W/O   Final Result      1.  Small to moderate sized acute infarct involving the left MCA territory with no evidence of hemorrhagic transformation.   2.  Mild diffuse cerebral substance loss.   3.  Mild microangiopathic ischemic change versus demyelination or gliosis.      [id number] VIEW   Final Result      No evidence of an implanted electronic device within the abdomen or pelvis which would preclude MRI scanning.      DX-CHEST-PORTABLE (1 VIEW)   Final Result      Bilateral interstitial infiltrates.      EC-ECHOCARDIOGRAM COMPLETE W/O CONT   Final Result      IR-THROMBECTOMY ARTERY SECONDARY   Final Result      1.  Occluded left M1 segment.   2.  Mechanical thrombectomy.   3.  TICI 2b flow.      CT-CEREBRAL PERFUSION ANALYSIS   Final Result      1.  Cerebral blood flow less than 30% likely representing completed infarct = 0 mL.      2.  T Max more than 6 seconds likely representing combination of completed infarct and ischemia = 142 mL.      3.  Mismatched volume likely representing ischemic brain/penumbra = 142 mL      4.  Please note that the cerebral perfusion was performed on the limited brain tissue around the basal ganglia region. Infarct/ischemia outside the CT perfusion sections can be missed in this study.      CT-CTA NECK WITH & W/O-POST PROCESSING   Final Result      1.  Atherosclerotic disease as described. Resulting stenosis is less than 50%.      2.  Bilateral pleural effusions layer posteriorly. Adjacent airspace disease likely atelectasis.      CT-CTA HEAD WITH & W/O-POST PROCESS   Final Result      Occlusion of the left M1 artery.      BRITTANY YE was aware of this abnormality at 4:15 PM on 2/20/2021.      CT-HEAD W/O   Final Result      1.  No acute intracranial findings.      2.  Mild diffuse atrophy. Periventricular white matter  changes consistent with chronic small vessel disease.              Assessment/Plan  * CVA (cerebral vascular accident) (HCC)- (present on admission)  Assessment & Plan  Status post alteplase and thrombectomy left M1 with  TICI 2 b  Continue telemetry monitoring   Apixaban 5mg BID  Neurology recommended cardiology eval for loop recorder Dr. Stein contacted recommended continue telemetry monitoring as inpatient and Holter monitor on discharge  PT/OT/SLP  Counseled on methamphetamine cessation and smoking cessation    Abnormal chest CT- (present on admission)  Assessment & Plan  2/11/2021 Bilateral pleural effusions on CT abd/pelvis  Diurese as  tolerated    Abnormal echocardiogram- (present on admission)  Assessment & Plan  2/20/2021 Echo:   Normal left ventricular chamber size.  Normal left ventricular wall thickness.  Severely reduced left ventricular systolic function.  Left ventricular ejection fraction is visually estimated to be 25%.  Mild mitral regurgitation.  Moderate mitral stenosis.  Aortic sclerosis without stenosis.  No evidence of right to left shunt by agitated saline challenge.    Aphasia  Assessment & Plan  Broca aphasia  Continue follow-up with the SLP    Mitral stenosis  Assessment & Plan  Need outpatient follow-up with cardiology    Cardiomyopathy (HCC)  Assessment & Plan  Likely secondary to methamphetamine use  Lasix 20mg IV daily, lisinopril 5mg daily, coreg 6.25mg BID  Blood pressure tolerates diuresis start low-dose ACE inhibitor and beta-blocker and titrate as tolerated  She will need follow-up with cardiology after she recovers from her acute hospitalization for further work-up and monitoring    Acute cystitis without hematuria- (present on admission)  Assessment & Plan  Pyuria patient is asymptomatic  Monitor off antibiotics  Normal WBC, afebrile as of 2/23    Tobacco dependence  Assessment & Plan  Cessation encouraged 2/23    Methamphetamine abuse (HCC)- (present on  admission)  Assessment & Plan  Counseled on cessation    Crohn disease (HCC)- (present on admission)  Assessment & Plan  Patient has known disease.    Vitamin D deficiency  Assessment & Plan  P.o. repletion       VTE prophylaxis: lovenox 40mg daily

## 2021-02-24 NOTE — PROGRESS NOTES
Cardiology Progress Note    Date:  2/24/21    Reason for Consult:  Anticoagulation and possible A-fib     Background:  Ms. Stephen is a 59 yo female with hx of Crohn's disease, tobacco use, methamphetamine use, anxiety and depression aditted 2/20 for right sided weakness and dysphagia. She was found to have a left MCA stroke and was treated with TPA and thrombectomy. Likely a cardioembolic stroke. Patient found to have an EF of 25%.     Interval Problem:  Patient reports no complaints overnight.   Denies chest pain, palpitations, headache, or dizziness.  Sinus rhythm on telemetry.  No evidence of atrial fibrillation on telemonitoring throughout hospital stay.  Patient was started on Eliquis this AM.    Review of systems:  Review of Systems   Constitutional: Negative for chills and fever.   Respiratory: Negative for cough and shortness of breath.    Cardiovascular: Negative for chest pain, palpitations and leg swelling.   Gastrointestinal: Negative for abdominal pain and vomiting.   Skin: Negative for rash.   Neurological: Positive for focal weakness. Negative for dizziness and headaches.       Physical exam:  Vitals:    02/23/21 2000 02/24/21 0000 02/24/21 0400 02/24/21 0822   BP: 105/62 127/80 128/83 114/76   Pulse: 90 94 94 92   Resp: 17 17 17 16   Temp: 36.1 °C (97 °F) 36.2 °C (97.2 °F) 36.2 °C (97.2 °F) 36.7 °C (98 °F)   TempSrc: Temporal Temporal Temporal Temporal   SpO2: 95% 94% 95% 96%   Weight:       Height:         Physical Exam   Constitutional: She is oriented to person, place, and time and well-developed, well-nourished, and in no distress.   HENT:   Head: Normocephalic and atraumatic.   Cardiovascular: Normal rate, regular rhythm and normal heart sounds. Exam reveals no gallop and no friction rub.   No murmur heard.  Pulmonary/Chest: Effort normal and breath sounds normal. No respiratory distress.   Abdominal: Soft. Bowel sounds are normal.   Neurological: She is alert and oriented to person, place, and  time.   Skin: Skin is warm and dry.       Data:  Laboratory studies personally reviewed by me:  No results found for this or any previous visit (from the past 24 hour(s)).    Imaging:  MR-BRAIN-W/O   Final Result      1.  Small to moderate sized acute infarct involving the left MCA territory with no evidence of hemorrhagic transformation.   2.  Mild diffuse cerebral substance loss.   3.  Mild microangiopathic ischemic change versus demyelination or gliosis.      [id number] VIEW   Final Result      No evidence of an implanted electronic device within the abdomen or pelvis which would preclude MRI scanning.      DX-CHEST-PORTABLE (1 VIEW)   Final Result      Bilateral interstitial infiltrates.      EC-ECHOCARDIOGRAM COMPLETE W/O CONT   Final Result      IR-THROMBECTOMY ARTERY SECONDARY   Final Result      1.  Occluded left M1 segment.   2.  Mechanical thrombectomy.   3.  TICI 2b flow.      CT-CEREBRAL PERFUSION ANALYSIS   Final Result      1.  Cerebral blood flow less than 30% likely representing completed infarct = 0 mL.      2.  T Max more than 6 seconds likely representing combination of completed infarct and ischemia = 142 mL.      3.  Mismatched volume likely representing ischemic brain/penumbra = 142 mL      4.  Please note that the cerebral perfusion was performed on the limited brain tissue around the basal ganglia region. Infarct/ischemia outside the CT perfusion sections can be missed in this study.      CT-CTA NECK WITH & W/O-POST PROCESSING   Final Result      1.  Atherosclerotic disease as described. Resulting stenosis is less than 50%.      2.  Bilateral pleural effusions layer posteriorly. Adjacent airspace disease likely atelectasis.      CT-CTA HEAD WITH & W/O-POST PROCESS   Final Result      Occlusion of the left M1 artery.      BRITTANY YE was aware of this abnormality at 4:15 PM on 2/20/2021.      CT-HEAD W/O   Final Result      1.  No acute intracranial findings.      2.  Mild diffuse atrophy.  Periventricular white matter changes consistent with chronic small vessel disease.           Echo: 2/20/21   Normal left ventricular chamber size.  Normal left ventricular wall thickness.  Severely reduced left ventricular systolic function.  Left ventricular ejection fraction is visually estimated to be 25%.  Mild mitral regurgitation.  Moderate mitral stenosis.  Aortic sclerosis without stenosis.  No evidence of right to left shunt by agitated saline challenge.    CXR:  2/21/21: Bilateral interstitial infiltrates.     MRI Brain 2/21: Small to moderate sized acute infarct involving the left MCA territory with no evidence of hemorrhagic transformation.  Mild diffuse cerebral substance loss. Mild microangiopathic ischemic change versus demyelination or gliosis.     IR Thrombectomy 2/20/21: Occluded left M1 segment.  Mechanical thrombectomy. TICI 2b flow.     Telemetry: Sinus rhythm    Assessment / Plan:  1. Left MCA stroke, likely Embolic  Ms. Stephen is a 61 yo female admitted for left MCA stroke. She was treated with TPA and thrombectomy. Stroke was likley to be cardio-embolic due to the clinical picture and the angiogram findings. Because of the likely rivera of cardioembolic stroke, will start on Eliquis for stroke prevention when neurology allows for anticoagulation. There is no need for a loop recorder or Holter monitor at this time because patient is being treated with anticoagulation.  - continue Eliquis    2. Systolic Heart failure, EF 25%   Patient with systolic heart failure with EF of 25% which puts her at risk for atrial fibrillation. Heart failure is likely due to methamphetamine use.   Recommend repeating echocardiogram in 2-3 months outpatient to monitor for improvement in EF. Discussed the importance of stopping methamphetamine use with the patient. Follow up in cardiology outpatient office in 1 month.  - Continue ACE and BB  - repeat echo in 2-3 months   - Follow up with cardiology clinic in 1 month  -  Methamphetamine cessation counseling     Cardiology team is signing off. Please contact us with any questions.     Thank you for allowing me to participate in the care of this patient. Please call if any clarification will be helpful.     Ismael Garay MD, PGY-2     Case discussed with the attending cardiologist, Dr. Stein.

## 2021-02-24 NOTE — THERAPY
"Speech Language Pathology  Daily Treatment     Patient Name: Imani Stephen  Age:  60 y.o., Sex:  female  Medical Record #: [id number]  Today's Date: 2/24/2021     Precautions  Precautions: (P) Fall Risk, Swallow Precautions ( See Comments)  Comments: (P) expressive aphasia    Assessment    SLP finishing tx with bed two and this pt noted to be coughing during the end of her bfast meal and when drinking thickened milk. Tray moved to the side until this pt could be seen for tx. Pt with 100% eaten of her puree. Pt given sips of NTL by the spoon and cup with one cough and throat clearing x2. Clear voice with two spontaneous verbal \"yes\" responses. SLP collaborated with nurs,  reposted swallow strategies, and modified diet order to recommend 1-1 suprv.     Plan  PU4/MT2 1-1  Continue current treatment plan.    Discharge Recommendations: (P) Recommend post-acute placement for additional speech therapy services prior to discharge home       02/24/21 1010   Voice   Comments low intensity for spontaneous \"yes\" response x2   Dysphagia    Dysphagia X   Positioning / Behavior Modification Modulate Rate or Bite Size;Multiple Swallows;Other (see Comments)  (sitting at 90 degrees)   Diet / Liquid Recommendation Puree (4)   Nutritional Liquid Intake Rating Scale Thickened beverages (mildly thick unless otherwise specified)   Nutritional Food Intake Rating Scale Total oral diet of a single consistency   Nursing Communication Swallow Precaution Sign Posted at Head of Bed   Skilled Intervention Compensatory Strategies;Verbal Cueing  (strategies reposted today)   Recommended Route of Medication Administration   Medication Administration  Crush all Medications in Puree   Patient / Family Goals   Patient / Family Goal #1 nodded 'yes' for wanting TV on   Goal #1 Outcome Goal met   Short Term Goals   Short Term Goal # 1 Pt will consume a diet of puree/mildly thick liquids with no s/sx of aspiration and min cues.    Goal Outcome # 1 " Progressing as expected

## 2021-02-24 NOTE — THERAPY
Occupational Therapy  Daily Treatment     Patient Name: Imani Stephen  Age:  60 y.o., Sex:  female  Medical Record #: [id number]  Today's Date: 2/24/2021       Precautions: Fall Risk, Swallow Precautions ( See Comments)  Comments: aphasic     Assessment    Pt seen for OT tx. Continues to be limited by decreased activity tolerance, balance deficits, inattention, aphasia and poor safety awareness impacting ability to complete ADLs and ADL transfers independently. Able to answer yes or no questions appropriately. Min A for balance and safety amb w/ HHA. Will continue to benefit from OT services while in house.     Plan    Continue current treatment plan.    DC Equipment Recommendations: Unable to determine at this time  Discharge Recommendations: Recommend post-acute placement for additional occupational therapy services prior to discharge home       02/24/21 1101   Cognition    Cognition / Consciousness X   Safety Awareness Impaired;Impulsive   Attention Impaired   Comments pleasant and cooperative. quick w/ movement and easily distracted    Balance   Sitting Balance (Static) Good   Sitting Balance (Dynamic) Fair +   Standing Balance (Static) Fair -   Standing Balance (Dynamic) Fair -   Weight Shift Sitting Good   Weight Shift Standing Fair   Bed Mobility    Supine to Sit Supervised   Sit to Supine Supervised   Scooting Supervised   Rolling Supervised   Activities of Daily Living   Grooming Minimal Assist;Standing   Upper Body Dressing Minimal Assist   Lower Body Dressing Supervision   Toileting Minimal Assist   Functional Mobility   Sit to Stand Minimal Assist   Bed, Chair, Wheelchair Transfer Minimal Assist   Toilet Transfers Minimal Assist   Short Term Goals   Short Term Goal # 1 Pt will demo toilet txf w/ SPV   Goal Outcome # 1 Progressing as expected   Short Term Goal # 2 Pt will demo standing grooming w/ SPV   Goal Outcome # 2 Progressing as expected   Short Term Goal # 3 Pt will demo toilet hygiene w/ SPV    Goal Outcome # 3 Progressing as expected   Anticipated Discharge Equipment and Recommendations   DC Equipment Recommendations Unable to determine at this time   Discharge Recommendations Recommend post-acute placement for additional occupational therapy services prior to discharge home

## 2021-02-24 NOTE — PROGRESS NOTES
Monitor summary: SR 87-99, NE .12, QRS .08, QT .36 with rare PACs per strip from monitor room.

## 2021-02-24 NOTE — DISCHARGE PLANNING
Agency/Facility Name: [facility]  Spoke To: Don  Outcome: Patient agreeable to IRF. Will get insurance auth.

## 2021-02-24 NOTE — THERAPY
Physical Therapy   Daily Treatment     Patient Name: Imani Stephen  Age:  60 y.o., Sex:  female  Medical Record #: [id number]  Today's Date: 2/24/2021     Precautions: (P) Fall Risk, Swallow Precautions ( See Comments)    Assessment    Patient progress with functional mobility evidenced by ability to increased ambulation distance, however continues require Rashawn for transfers and ambulation. Patient limited by weakness, impaired balance, decreased safety awareness, and decreased activity tolerance. Patient's primary concern for safety and self care given cognition- continue to defer to other disciplines regarding discharge recommendations.     Plan    Continue current treatment plan.    DC Equipment Recommendations: (P) Unable to determine at this time  Discharge Recommendations: (P) Other -(cont to defer to OT and SLP)       Objective       02/24/21 1410   Gait Analysis   Gait Level Of Assist Minimal Assist   Assistive Device None   Distance (Feet) 25   # of Times Distance was Traveled 2   # of Stairs Climbed 0   Weight Bearing Status no restrictions   Bed Mobility    Supine to Sit Supervised   Sit to Supine Supervised   Scooting Supervised   Rolling Supervised   Functional Mobility   Sit to Stand Minimal Assist   Bed, Chair, Wheelchair Transfer Minimal Assist   Toilet Transfers Minimal Assist   Transfer Method Stand Step   Mobility no AD   Skilled Intervention Facilitation;Postural Facilitation;Tactile Cuing;Verbal Cuing;Sequencing   Comments poor eccentric control   Short Term Goals    Short Term Goal # 1 Patient will perform transfers with supervision within 6tx in order to achieve functional transfer and progress independence   Goal Outcome # 1 goal not met   Short Term Goal # 2 Patient will ambulate 150ft with supervision within 6tx in order to access environment at PLOF   Goal Outcome # 2 Goal not met   Anticipated Discharge Equipment and Recommendations   DC Equipment Recommendations Unable to determine at  this time   Discharge Recommendations Other -  (cont to defer to OT and SLP)

## 2021-02-24 NOTE — DISCHARGE PLANNING
Follow up for rehab spoke with Joni HITCHCOCK. Joni is able to provide Physical  support as well as 24/7 supervision. Joni is available for family trancing . Joni is agreeable to transfer to [facility] when medically cleared.

## 2021-02-24 NOTE — PREADMISSION SCREENING NOTE
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 Nidia Stein M.D. at 2/23/2021 12:04 PM     Co-morbidities: See above  Potential Risk - Complications: Aphasia, Cognitive Impairment, Contractures, Deep Vein Thrombosis, Dysphagia, Incontinence, Malnutrition, Pain, Paralysis, Perceptual Impairment, Pneumonia, Pressure Ulcer, Seizures and Infection  Level of Risk: High    Ongoing Medical Management Needed (Medical/Nursing Needs):   Patient Active Problem List    Diagnosis Date Noted   • CVA (cerebral vascular accident) ([facility]) 02/20/2021   • Abnormal chest CT 02/20/2021   • Received intravenous tissue plasminogen activator (tPA) in emergency department 02/20/2021   • Pyelonephritis 12/02/2019   • Sepsis ([facility]) 12/02/2019   • Abnormal echocardiogram 02/21/2021   • Pleural effusion, bilateral 02/20/2021   • Crohn disease ([facility]) 02/20/2021   • Vitamin D deficiency 04/20/2010   • Depression 03/17/2010   • Multiple thyroid nodules 02/24/2021   • Cardiomyopathy ([facility]) 02/22/2021   • Mitral stenosis 02/22/2021   • Methamphetamine abuse ([facility]) 02/21/2021   • Tobacco dependence 02/21/2021   • Acute cystitis without hematuria 02/21/2021   • Renal mass, right 10/04/2012   •  "Carpal tunnel syndrome 01/20/2012   • Chronic neck pain 01/20/2012   • Renal mass 01/20/2012   • Neuropathic pain of hand 10/21/2011   • Chronic back pain 10/21/2011   • Allergic rhinitis 10/21/2011   • Nausea & vomiting 10/21/2011   • Arthritis 10/05/2010     Palmer Hatch R.N.   Registered Nurse      Progress Notes   Signed   Date of Service:  2/24/2021  4:36 AM                    Monitor summary: SR 87-99, MI .12, QRS .08, QT .36 with rare PACs per strip from monitor room.              Current Vital Signs:   Temperature: 36.7 °C (98 °F) Pulse: 92 Respiration: 16 Blood Pressure: 114/76  Weight: 51 kg (112 lb 7 oz) Height: 154.9 cm (5' 0.98\")  Pulse Oximetry: 96 % O2 (LPM): 0      Completed Laboratory Reports:  Recent Labs     02/22/21  0430 02/23/21  0410   WBC 7.3 7.1   HEMOGLOBIN 10.9* 11.4*   HEMATOCRIT 34.7* 35.8*   PLATELETCT 280 312   SODIUM 136 140   POTASSIUM 3.6 4.1   BUN 14 12   CREATININE 0.69 0.65   GLUCOSE 97 100*     Additional Labs: Not Applicable    Prior Living Situation:   Housing / Facility: [address]  Lives with - Patient's Self Care Capacity: Significant Other    Prior Level of Function / Living Situation:   Physical Therapy: Prior Services: Home-Independent  Housing / Facility: [address]  Lives with - Patient's Self Care Capacity: Significant Other  Bed Mobility: Independent  Transfer Status: Independent  Ambulation: Independent  Distance Ambulation (Feet): (community)  Assistive Devices Used: None  Current Level of Function:   Gait Level Of Assist: Minimal Assist(for safety given cognition)  Assistive Device: None  Distance (Feet): 15  Deviation: (decreased akira, step length)  # of Stairs Climbed: 0  Weight Bearing Status: no restrictions  Supine to Sit: Supervised  Sit to Supine: (NT, left in chair)  Scooting: Supervised  Sit to Stand: Minimal Assist  Bed, Chair, Wheelchair Transfer: Minimal Assist  Transfer Method: Stand Step  Sitting in Chair: left in chair  Sitting Edge of " Bed: 5 min  Standin min  Occupational Therapy:   Self Feeding: Independent  Grooming / Hygiene: Independent  Bathing: Independent  Dressing: Independent  Toileting: Independent  Prior Services: Home-Independent  Housing / Facility: [address]  Occupation (Pre-Hospital Vocational): Retired Due To Disability  Current Level of Function:   Upper Body Dressing: Minimal Assist  Lower Body Dressing: Supervision(socks)  Toileting: (ulloa in place)  Speech Language Pathology:   Problem List: Aphasia, Dysphagia  Diet / Liquid Recommendation: Kari (4)  Rehabilitation Prognosis/Potential: Good  Estimated Length of Stay: 14-21 days    Nursing:   Orientation : Disoriented to Time, Disoriented to Event  Continent    Scope/Intensity of Services Recommended:  Physical Therapy: 1 hr / day  5 days / week. Therapeutic Interventions Required: Maximize Endurance, Mobility, Strength and Safety  Occupational Therapy: 1 hr / day 5 days / week. Therapeutic Interventions Required: Maximize Self Care, ADLs, IADLs and Energy Conservation  Speech & Language Pathology: 1 hr / day 5 days / week. Therapeutic Interventions Required: Maximize Cognition, Swallowing and Safety  Rehabilitation Nursin/7. Therapeutic Interventions Required: Monitor Pain, Skin, Vital Signs, Intake and Output, Labs, Safety, Aspiration Risk, Family Training and Monitor R groin incision for appropriate healing; DVT Prophylaxis; Bowel & Bladder regimen; Infection control; ADL's.  Rehabilitation Physician: 3 - 5 days / week. Therapeutic Interventions Required: Medical Management  Respiratory Care: Daily. Therapeutic Interventions Required: Pulmonary Toileting, O2 Weaning, Aspiration Risk and Respiratory care per protocol  Dietician: Consult. Therapeutic Interventions Required: Nutritional evaluation with recommendations to promote optimal heatlh/healing.    She requires 24-hour rehabilitation nursing to manage bowel and bladder function, skin care, surgical  incision, nutrition and fluid intake, pulmonary hygiene, pain control, safety, medication management and patient/family goals. In addition, rehabilitation nursing will reiterate and reinforce therapy skills and equipment use, including ADLs, as well as provide education to the patient and family. Imani Stephen is willing to participate in and is able to tolerate the proposed plan of care.    Rehabilitation Goals and Plan (Expected frequency & duration of treatment in the IRF):   Return to the Community, Modified Independent Level of Care and Outpatient Support  Anticipated Date of Rehabilitation Admission: 2/24/2021  Patient/Family oriented IRF level of care/facility/plan: Yes  Patient/Family willing to participate in IRF care/facility/plan: Yes  Patient able to tolerate IRF level of care proposed: Yes  Patient has potential to benefit IRF level of care proposed: Yes  Comments: Not Applicable    Special Needs or Precautions - Medical Necessity:  Safety Concerns/Precautions:  Fall Risk / High Risk for Falls, Balance, Cognition and Bed / Chair Alarm  Complex Wound Care: R groin IR incision  Pain Management  Requires Oxygen     Diet:   DIET ORDERS (From admission to next 24h)     Start     Ordered    02/24/21 1009  Diet Order Diet: Level 4 - Pureed; Liquid level: Level 2 - Mildly Thick; Tray Modifications (optional): No Straws, SLP - 1:1 Supervision by Nursing, SLP - Deliver to Nursing Station  ALL MEALS     Question Answer Comment   Diet: Level 4 - Pureed    Liquid level Level 2 - Mildly Thick    Tray Modifications (optional) No Straws    Tray Modifications (optional) SLP - 1:1 Supervision by Nursing    Tray Modifications (optional) SLP - Deliver to Nursing Station        02/24/21 1009                Anticipated Discharge Destination / Patient/Family Goal:  Destination: Home with Assistance Support System: Significant Other  Anticipated home health services: OT, PT, SLP, Nursing and Social Work  Previously used HH  service/ provider: Not Applicable  Anticipated DME Needs: To be determined  Outpatient Services: To be determined  Alternative resources to address additional identified needs:   Outpatient Stroke Support Group  Pre-Screen Completed: 2/24/2021 10:41 AM Judith Temple R.N.

## 2021-02-24 NOTE — DISCHARGE PLANNING
[facility]     Physiatry Dr. Christianson recommending appropriate for inpatient rehab.  Call out to Hillary Reinoso requesting consideration for IRF level care.  Following for auth determination.

## 2021-02-24 NOTE — DISCHARGE PLANNING
[facility]     Insurance has authorized inpatient rehab.  Limited bed availability [facility] - next earliest bed availability Thursday or Friday.  TCC following.

## 2021-02-25 ENCOUNTER — HOSPITAL ENCOUNTER (INPATIENT)
Facility: REHABILITATION | Age: 61
LOS: 8 days | DRG: 056 | End: 2021-03-05
Attending: PHYSICAL MEDICINE & REHABILITATION | Admitting: PHYSICAL MEDICINE & REHABILITATION
Payer: COMMERCIAL

## 2021-02-25 VITALS
SYSTOLIC BLOOD PRESSURE: 104 MMHG | BODY MASS INDEX: 21.23 KG/M2 | HEIGHT: 61 IN | RESPIRATION RATE: 17 BRPM | DIASTOLIC BLOOD PRESSURE: 66 MMHG | TEMPERATURE: 98.8 F | HEART RATE: 87 BPM | WEIGHT: 112.43 LBS | OXYGEN SATURATION: 98 %

## 2021-02-25 PROBLEM — R18.8 OTHER ASCITES: Status: ACTIVE | Noted: 2021-02-25

## 2021-02-25 PROBLEM — E78.49 OTHER HYPERLIPIDEMIA: Status: ACTIVE | Noted: 2021-02-25

## 2021-02-25 PROBLEM — K76.6 PORTAL HYPERTENSION (HCC): Status: ACTIVE | Noted: 2021-02-25

## 2021-02-25 PROBLEM — I50.21 ACUTE SYSTOLIC CHF (CONGESTIVE HEART FAILURE) (HCC): Status: ACTIVE | Noted: 2021-02-25

## 2021-02-25 PROCEDURE — A9270 NON-COVERED ITEM OR SERVICE: HCPCS | Performed by: INTERNAL MEDICINE

## 2021-02-25 PROCEDURE — 700102 HCHG RX REV CODE 250 W/ 637 OVERRIDE(OP): Performed by: INTERNAL MEDICINE

## 2021-02-25 PROCEDURE — U0003 INFECTIOUS AGENT DETECTION BY NUCLEIC ACID (DNA OR RNA); SEVERE ACUTE RESPIRATORY SYNDROME CORONAVIRUS 2 (SARS-COV-2) (CORONAVIRUS DISEASE [COVID-19]), AMPLIFIED PROBE TECHNIQUE, MAKING USE OF HIGH THROUGHPUT TECHNOLOGIES AS DESCRIBED BY CMS-2020-01-R: HCPCS

## 2021-02-25 PROCEDURE — A9270 NON-COVERED ITEM OR SERVICE: HCPCS | Performed by: HOSPITALIST

## 2021-02-25 PROCEDURE — A9270 NON-COVERED ITEM OR SERVICE: HCPCS | Performed by: NURSE PRACTITIONER

## 2021-02-25 PROCEDURE — 700102 HCHG RX REV CODE 250 W/ 637 OVERRIDE(OP): Performed by: HOSPITALIST

## 2021-02-25 PROCEDURE — 99239 HOSP IP/OBS DSCHRG MGMT >30: CPT | Performed by: STUDENT IN AN ORGANIZED HEALTH CARE EDUCATION/TRAINING PROGRAM

## 2021-02-25 PROCEDURE — 770010 HCHG ROOM/CARE - REHAB SEMI PRIVAT*

## 2021-02-25 PROCEDURE — 700111 HCHG RX REV CODE 636 W/ 250 OVERRIDE (IP): Performed by: HOSPITALIST

## 2021-02-25 PROCEDURE — 700102 HCHG RX REV CODE 250 W/ 637 OVERRIDE(OP): Performed by: NURSE PRACTITIONER

## 2021-02-25 PROCEDURE — 99223 1ST HOSP IP/OBS HIGH 75: CPT | Performed by: PHYSICAL MEDICINE & REHABILITATION

## 2021-02-25 PROCEDURE — U0005 INFEC AGEN DETEC AMPLI PROBE: HCPCS

## 2021-02-25 PROCEDURE — 94760 N-INVAS EAR/PLS OXIMETRY 1: CPT

## 2021-02-25 PROCEDURE — 700102 HCHG RX REV CODE 250 W/ 637 OVERRIDE(OP): Performed by: PHYSICAL MEDICINE & REHABILITATION

## 2021-02-25 PROCEDURE — A9270 NON-COVERED ITEM OR SERVICE: HCPCS | Performed by: PHYSICAL MEDICINE & REHABILITATION

## 2021-02-25 RX ORDER — POLYVINYL ALCOHOL 14 MG/ML
1 SOLUTION/ DROPS OPHTHALMIC PRN
Status: DISCONTINUED | OUTPATIENT
Start: 2021-02-25 | End: 2021-03-05 | Stop reason: HOSPADM

## 2021-02-25 RX ORDER — FUROSEMIDE 20 MG/1
20 TABLET ORAL
Status: DISCONTINUED | OUTPATIENT
Start: 2021-02-26 | End: 2021-02-26

## 2021-02-25 RX ORDER — TRAZODONE HYDROCHLORIDE 50 MG/1
50 TABLET ORAL
Status: DISCONTINUED | OUTPATIENT
Start: 2021-02-25 | End: 2021-03-05 | Stop reason: HOSPADM

## 2021-02-25 RX ORDER — MIDAZOLAM HYDROCHLORIDE 5 MG/ML
5 INJECTION INTRAMUSCULAR; INTRAVENOUS PRN
Status: DISCONTINUED | OUTPATIENT
Start: 2021-02-25 | End: 2021-03-05 | Stop reason: HOSPADM

## 2021-02-25 RX ORDER — ONDANSETRON 2 MG/ML
4 INJECTION INTRAMUSCULAR; INTRAVENOUS 4 TIMES DAILY PRN
Status: DISCONTINUED | OUTPATIENT
Start: 2021-02-25 | End: 2021-03-05 | Stop reason: HOSPADM

## 2021-02-25 RX ORDER — LACTULOSE 20 G/30ML
30 SOLUTION ORAL
Status: DISCONTINUED | OUTPATIENT
Start: 2021-02-25 | End: 2021-03-05 | Stop reason: HOSPADM

## 2021-02-25 RX ORDER — CARVEDILOL 3.12 MG/1
3.12 TABLET ORAL 2 TIMES DAILY WITH MEALS
Status: DISCONTINUED | OUTPATIENT
Start: 2021-02-25 | End: 2021-03-04

## 2021-02-25 RX ORDER — ECHINACEA PURPUREA EXTRACT 125 MG
2 TABLET ORAL PRN
Status: DISCONTINUED | OUTPATIENT
Start: 2021-02-25 | End: 2021-03-05 | Stop reason: HOSPADM

## 2021-02-25 RX ORDER — ALUMINA, MAGNESIA, AND SIMETHICONE 2400; 2400; 240 MG/30ML; MG/30ML; MG/30ML
20 SUSPENSION ORAL
Status: DISCONTINUED | OUTPATIENT
Start: 2021-02-25 | End: 2021-03-05 | Stop reason: HOSPADM

## 2021-02-25 RX ORDER — FUROSEMIDE 10 MG/ML
20 INJECTION INTRAMUSCULAR; INTRAVENOUS DAILY
Qty: 60 ML | Refills: 0 | Status: ON HOLD | OUTPATIENT
Start: 2021-02-26 | End: 2021-03-04

## 2021-02-25 RX ORDER — HYDROXYZINE HYDROCHLORIDE 25 MG/1
50 TABLET, FILM COATED ORAL EVERY 6 HOURS PRN
Status: DISCONTINUED | OUTPATIENT
Start: 2021-02-25 | End: 2021-03-05 | Stop reason: HOSPADM

## 2021-02-25 RX ORDER — ATORVASTATIN CALCIUM 80 MG/1
80 TABLET, FILM COATED ORAL EVERY EVENING
Status: CANCELLED | OUTPATIENT
Start: 2021-02-25

## 2021-02-25 RX ORDER — LANOLIN ALCOHOL/MO/W.PET/CERES
3 CREAM (GRAM) TOPICAL NIGHTLY PRN
Status: DISCONTINUED | OUTPATIENT
Start: 2021-02-25 | End: 2021-03-05 | Stop reason: HOSPADM

## 2021-02-25 RX ORDER — POLYETHYLENE GLYCOL 3350 17 G/17G
1 POWDER, FOR SOLUTION ORAL
Status: DISCONTINUED | OUTPATIENT
Start: 2021-02-25 | End: 2021-03-05 | Stop reason: HOSPADM

## 2021-02-25 RX ORDER — LISINOPRIL 5 MG/1
5 TABLET ORAL
Status: CANCELLED | OUTPATIENT
Start: 2021-02-26

## 2021-02-25 RX ORDER — ENEMA 19; 7 G/133ML; G/133ML
1 ENEMA RECTAL
Status: DISCONTINUED | OUTPATIENT
Start: 2021-02-25 | End: 2021-03-05 | Stop reason: HOSPADM

## 2021-02-25 RX ORDER — CARVEDILOL 3.12 MG/1
3.12 TABLET ORAL 2 TIMES DAILY WITH MEALS
Qty: 60 TABLET | Refills: 0 | Status: ON HOLD | OUTPATIENT
Start: 2021-02-25 | End: 2021-03-04

## 2021-02-25 RX ORDER — ONDANSETRON 4 MG/1
4 TABLET, ORALLY DISINTEGRATING ORAL 4 TIMES DAILY PRN
Status: DISCONTINUED | OUTPATIENT
Start: 2021-02-25 | End: 2021-03-05 | Stop reason: HOSPADM

## 2021-02-25 RX ORDER — ERGOCALCIFEROL 1.25 MG/1
50000 CAPSULE ORAL
Qty: 4 CAPSULE | Refills: 0 | Status: ON HOLD | OUTPATIENT
Start: 2021-03-01 | End: 2021-03-04 | Stop reason: SDUPTHER

## 2021-02-25 RX ORDER — FUROSEMIDE 10 MG/ML
20 INJECTION INTRAMUSCULAR; INTRAVENOUS
Status: DISCONTINUED | OUTPATIENT
Start: 2021-02-26 | End: 2021-02-25

## 2021-02-25 RX ORDER — ERGOCALCIFEROL 1.25 MG/1
50000 CAPSULE ORAL
Status: DISCONTINUED | OUTPATIENT
Start: 2021-03-01 | End: 2021-03-05 | Stop reason: HOSPADM

## 2021-02-25 RX ORDER — AMOXICILLIN 250 MG
2 CAPSULE ORAL 2 TIMES DAILY
Status: CANCELLED | OUTPATIENT
Start: 2021-02-25

## 2021-02-25 RX ORDER — CARVEDILOL 6.25 MG/1
3.12 TABLET ORAL 2 TIMES DAILY WITH MEALS
Status: CANCELLED | OUTPATIENT
Start: 2021-02-25

## 2021-02-25 RX ORDER — POTASSIUM CHLORIDE 20 MEQ/1
20 TABLET, EXTENDED RELEASE ORAL DAILY
Qty: 30 TABLET | Refills: 0 | Status: ON HOLD | OUTPATIENT
Start: 2021-02-26 | End: 2021-03-04

## 2021-02-25 RX ORDER — POTASSIUM CHLORIDE 20 MEQ/1
20 TABLET, EXTENDED RELEASE ORAL DAILY
Status: DISCONTINUED | OUTPATIENT
Start: 2021-02-26 | End: 2021-02-26

## 2021-02-25 RX ORDER — LISINOPRIL 5 MG/1
5 TABLET ORAL DAILY
Qty: 30 TABLET | Refills: 0 | Status: ON HOLD | OUTPATIENT
Start: 2021-02-26 | End: 2021-03-04

## 2021-02-25 RX ORDER — HYDRALAZINE HYDROCHLORIDE 10 MG/1
10 TABLET, FILM COATED ORAL EVERY 8 HOURS PRN
Status: DISCONTINUED | OUTPATIENT
Start: 2021-02-25 | End: 2021-03-05 | Stop reason: HOSPADM

## 2021-02-25 RX ORDER — POLYETHYLENE GLYCOL 3350 17 G/17G
1 POWDER, FOR SOLUTION ORAL
Status: CANCELLED | OUTPATIENT
Start: 2021-02-25

## 2021-02-25 RX ORDER — AMOXICILLIN 250 MG
2 CAPSULE ORAL 2 TIMES DAILY
Status: DISCONTINUED | OUTPATIENT
Start: 2021-02-25 | End: 2021-03-05 | Stop reason: HOSPADM

## 2021-02-25 RX ORDER — ACETAMINOPHEN 325 MG/1
650 TABLET ORAL EVERY 4 HOURS PRN
Status: DISCONTINUED | OUTPATIENT
Start: 2021-02-25 | End: 2021-03-05 | Stop reason: HOSPADM

## 2021-02-25 RX ORDER — ATORVASTATIN CALCIUM 80 MG/1
80 TABLET, FILM COATED ORAL EVERY EVENING
Qty: 30 TABLET | Refills: 0 | Status: ON HOLD | OUTPATIENT
Start: 2021-02-25 | End: 2021-03-04 | Stop reason: SDUPTHER

## 2021-02-25 RX ORDER — ATORVASTATIN CALCIUM 40 MG/1
80 TABLET, FILM COATED ORAL EVERY EVENING
Status: DISCONTINUED | OUTPATIENT
Start: 2021-02-25 | End: 2021-03-05 | Stop reason: HOSPADM

## 2021-02-25 RX ORDER — BISACODYL 10 MG
10 SUPPOSITORY, RECTAL RECTAL
Status: DISCONTINUED | OUTPATIENT
Start: 2021-02-25 | End: 2021-03-05 | Stop reason: HOSPADM

## 2021-02-25 RX ORDER — BISACODYL 10 MG
10 SUPPOSITORY, RECTAL RECTAL
Status: CANCELLED | OUTPATIENT
Start: 2021-02-25

## 2021-02-25 RX ORDER — POTASSIUM CHLORIDE 20 MEQ/1
20 TABLET, EXTENDED RELEASE ORAL DAILY
Status: CANCELLED | OUTPATIENT
Start: 2021-02-26

## 2021-02-25 RX ORDER — LISINOPRIL 5 MG/1
5 TABLET ORAL
Status: DISCONTINUED | OUTPATIENT
Start: 2021-02-26 | End: 2021-02-26

## 2021-02-25 RX ORDER — ERGOCALCIFEROL 1.25 MG/1
50000 CAPSULE ORAL
Status: CANCELLED | OUTPATIENT
Start: 2021-03-01

## 2021-02-25 RX ORDER — FUROSEMIDE 10 MG/ML
20 INJECTION INTRAMUSCULAR; INTRAVENOUS
Status: CANCELLED | OUTPATIENT
Start: 2021-02-26

## 2021-02-25 RX ADMIN — APIXABAN 5 MG: 5 TABLET, FILM COATED ORAL at 06:12

## 2021-02-25 RX ADMIN — DOCUSATE SODIUM 50 MG AND SENNOSIDES 8.6 MG 2 TABLET: 8.6; 5 TABLET, FILM COATED ORAL at 06:12

## 2021-02-25 RX ADMIN — ATORVASTATIN CALCIUM 80 MG: 40 TABLET, FILM COATED ORAL at 20:07

## 2021-02-25 RX ADMIN — SENNOSIDES AND DOCUSATE SODIUM 2 TABLET: 8.6; 5 TABLET ORAL at 20:07

## 2021-02-25 RX ADMIN — FUROSEMIDE 20 MG: 10 INJECTION, SOLUTION INTRAMUSCULAR; INTRAVENOUS at 06:12

## 2021-02-25 RX ADMIN — LISINOPRIL 5 MG: 5 TABLET ORAL at 06:12

## 2021-02-25 RX ADMIN — APIXABAN 5 MG: 5 TABLET, FILM COATED ORAL at 20:07

## 2021-02-25 RX ADMIN — CARVEDILOL 3.12 MG: 3.12 TABLET, FILM COATED ORAL at 18:07

## 2021-02-25 RX ADMIN — POTASSIUM CHLORIDE 20 MEQ: 1500 TABLET, EXTENDED RELEASE ORAL at 06:12

## 2021-02-25 RX ADMIN — CARVEDILOL 3.12 MG: 6.25 TABLET, FILM COATED ORAL at 09:04

## 2021-02-25 ASSESSMENT — FIBROSIS 4 INDEX: FIB4 SCORE: 0.87

## 2021-02-25 ASSESSMENT — LIFESTYLE VARIABLES
ON A TYPICAL DAY WHEN YOU DRINK ALCOHOL HOW MANY DRINKS DO YOU HAVE: 0
CONSUMPTION TOTAL: NEGATIVE
HOW MANY TIMES IN THE PAST YEAR HAVE YOU HAD 5 OR MORE DRINKS IN A DAY: 0
TOTAL SCORE: 0
HAVE YOU EVER FELT YOU SHOULD CUT DOWN ON YOUR DRINKING: NO
EVER_SMOKED: YES
EVER FELT BAD OR GUILTY ABOUT YOUR DRINKING: NO
TOTAL SCORE: 0
EVER HAD A DRINK FIRST THING IN THE MORNING TO STEADY YOUR NERVES TO GET RID OF A HANGOVER: NO
TOTAL SCORE: 0
AVERAGE NUMBER OF DAYS PER WEEK YOU HAVE A DRINK CONTAINING ALCOHOL: 0
HAVE PEOPLE ANNOYED YOU BY CRITICIZING YOUR DRINKING: NO
ALCOHOL_USE: NO

## 2021-02-25 NOTE — DISCHARGE SUMMARY
Discharge Summary    CHIEF COMPLAINT ON ADMISSION  No chief complaint on file.      Reason for Admission  EMS     CODE STATUS  Full Code    HPI & HOSPITAL COURSE  60 y.o. female w/ hx of methamphetamine abuse, Crohn's disease admitted 2/20/2021 with right sided weakness and difficulty with speech.  She received Alteplase and a left M1 thrombectomy. MRI brain from 2/21 confirms small to moderate left MCA acute infarct.  Status post alteplase and thrombectomy left M1 with  TICI 2 b.  Started apixaban 5 mg twice daily, neurology recommended a cardiology evaluation for loop recorder.  Cardiology saw patient, started on Eliquis giving a high risk of A. fib with methamphetamine induced cardiomyopathy.    Echocardiogram showed an EF:25% w/ moderate MS. 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  Carvedilol decreased to 3.125 mg BID, up-titrate every 1-2 weeks as BP allows per cardiology.   Continue lisinopril.  Spironolactone is not started due to BP in the lower range.  She has also been treated with diuretics for bilateral pleural effusion. Educated patient on methamphetamine cessation and smoking cessation. Recommend repeating echocardiogram in 2-3 months outpatient to monitor for improvement in EF    She was also treated for UTI.    Patient will be discharged to SNF per PT OT SLP recommendation    Therefore, she is discharged in good and stable condition to skilled nursing facility.    The patient met 2-midnight criteria for an inpatient stay at the time of discharge.      FOLLOW UP ITEMS POST DISCHARGE  Follow-up with PCP in 1 week  repeat echo in 2-3 months   Follow up with cardiology clinic in 1 month  Follow-up for neurology in a month    DISCHARGE DIAGNOSES  Principal Problem:    CVA (cerebral vascular accident) (HCC) POA: Yes  Active Problems:    Abnormal chest CT POA: Yes    Received intravenous tissue plasminogen activator (tPA) in emergency department POA: No    Abnormal echocardiogram  POA: Yes    Methamphetamine abuse (HCC) POA: Yes    Tobacco dependence POA: Unknown    Acute cystitis without hematuria POA: Yes    Cardiomyopathy (HCC) POA: Unknown    Mitral stenosis POA: Unknown    Multiple thyroid nodules POA: Unknown    Aphasia POA: Unknown    Depression POA: Yes    Crohn disease (HCC) (Chronic) POA: Yes  Resolved Problems:    Pleural effusion, bilateral POA: Yes      FOLLOW UP  No future appointments.  cardiology  Dr. Ismael Garay  In 1 month  Cardiomyopathy follow-up    pcp    In 1 month      Neurology    In 1 month  Recent stroke      MEDICATIONS ON DISCHARGE     Medication List      START taking these medications      Instructions   apixaban 5mg Tabs  Commonly known as: ELIQUIS   Take 1 tablet by mouth 2 Times a Day for 30 days. Indications: Thromboembolism secondary to Atrial Fibrillation  Dose: 5 mg     atorvastatin 80 MG tablet  Commonly known as: LIPITOR   Take 1 tablet by mouth every evening for 30 days.  Dose: 80 mg     carvedilol 3.125 MG Tabs  Commonly known as: COREG   Take 1 tablet by mouth 2 times a day, with meals for 30 days.  Dose: 3.125 mg     furosemide 10 MG/ML Soln  Start taking on: February 26, 2021  Commonly known as: LASIX   Infuse 2 mL into a venous catheter every day for 30 days.  Dose: 20 mg     lisinopril 5 MG Tabs  Start taking on: February 26, 2021  Commonly known as: PRINIVIL   Take 1 tablet by mouth every day for 30 days.  Dose: 5 mg     potassium chloride SA 20 MEQ Tbcr  Start taking on: February 26, 2021  Commonly known as: Kdur   Take 1 tablet by mouth every day for 30 days.  Dose: 20 mEq     vitamin D (Ergocalciferol) 1.25 MG (50475 UT) Caps capsule  Start taking on: March 1, 2021  Commonly known as: DRISDOL   Take 1 capsule by mouth every 7 days for 30 days.  Dose: 50,000 Units        CONTINUE taking these medications      Instructions   cyclobenzaprine 10 mg Tabs  Commonly known as: Flexeril   Take 1 Tab by mouth 3 times a day as needed.  Dose: 10 mg      ondansetron 4 MG Tbdp  Commonly known as: Zofran ODT   Take 1 tablet by mouth every 6 hours as needed.  Dose: 4 mg            Allergies  Allergies   Allergen Reactions   • Codeine Itching and Vomiting   • Pcn [Penicillins] Itching     Tolerates cephalosporins       DIET  Orders Placed This Encounter   Procedures   • Diet Order Diet: Level 4 - Pureed; Liquid level: Level 2 - Mildly Thick; Tray Modifications (optional): No Straws, SLP - 1:1 Supervision by Nursing, SLP - Deliver to Nursing Station     Standing Status:   Standing     Number of Occurrences:   1     Order Specific Question:   Diet:     Answer:   Level 4 - Pureed [25]     Order Specific Question:   Liquid level     Answer:   Level 2 - Mildly Thick     Order Specific Question:   Tray Modifications (optional)     Answer:   No Straws     Order Specific Question:   Tray Modifications (optional)     Answer:   SLP - 1:1 Supervision by Nursing     Order Specific Question:   Tray Modifications (optional)     Answer:   SLP - Deliver to Nursing Station       ACTIVITY  As tolerated.  Weight bearing as tolerated    LINES, DRAINS, AND WOUNDS  This is an automated list. Peripheral IVs will be removed prior to discharge.  Peripheral IV 02/20/21 18 G Left Forearm (Active)   Site Assessment Clean;Dry;Intact 02/24/21 2000   Dressing Type Transparent 02/24/21 2000   Line Status Flushed;Saline locked;Scrubbed the hub prior to access 02/24/21 2000   Dressing Status Intact 02/24/21 2000   Dressing Intervention N/A 02/24/21 2000   Dressing Change Due 02/27/21 02/23/21 0800   Date Primary Tubing Changed 02/20/21 02/20/21 2000   NEXT Primary Tubing Change  02/23/21 02/20/21 2000   Infiltration Grading ([facility]) 0 02/24/21 2000   Phlebitis Scale ([facility]) 0 02/24/21 2000       Peripheral IV 02/20/21 20 G Right Hand (Active)   Site Assessment Clean;Dry;Intact 02/24/21 2000   Dressing Type Transparent 02/24/21 2000   Line Status Flushed;Scrubbed the hub prior to  access;Saline locked 02/24/21 2000   Dressing Status Clean;Dry;Intact 02/24/21 2000   Dressing Intervention N/A 02/24/21 2000   Dressing Change Due 02/27/21 02/23/21 0800   Infiltration Grading ([facility]) 0 02/24/21 2000   Phlebitis Scale ([facility]) 0 02/24/21 2000          Peripheral IV 02/20/21 18 G Left Forearm (Active)   Site Assessment Clean;Dry;Intact 02/24/21 2000   Dressing Type Transparent 02/24/21 2000   Line Status Flushed;Saline locked;Scrubbed the hub prior to access 02/24/21 2000   Dressing Status Intact 02/24/21 2000   Dressing Intervention N/A 02/24/21 2000   Dressing Change Due 02/27/21 02/23/21 0800   Date Primary Tubing Changed 02/20/21 02/20/21 2000   NEXT Primary Tubing Change  02/23/21 02/20/21 2000   Infiltration Grading ([facility]) 0 02/24/21 2000   Phlebitis Scale ([facility]) 0 02/24/21 2000       Peripheral IV 02/20/21 20 G Right Hand (Active)   Site Assessment Clean;Dry;Intact 02/24/21 2000   Dressing Type Transparent 02/24/21 2000   Line Status Flushed;Scrubbed the hub prior to access;Saline locked 02/24/21 2000   Dressing Status Clean;Dry;Intact 02/24/21 2000   Dressing Intervention N/A 02/24/21 2000   Dressing Change Due 02/27/21 02/23/21 0800   Infiltration Grading ([facility]) 0 02/24/21 2000   Phlebitis Scale ([facility]) 0 02/24/21 2000               MENTAL STATUS ON TRANSFER  Level of Consciousness: Alert  Orientation : Disoriented to Event  Speech: Expressive Aphasia    CONSULTATIONS  Neurology  Cardiology    PROCEDURES  TPA    LABORATORY  Lab Results   Component Value Date    SODIUM 140 02/23/2021    POTASSIUM 4.1 02/23/2021    CHLORIDE 105 02/23/2021    CO2 26 02/23/2021    GLUCOSE 100 (H) 02/23/2021    BUN 12 02/23/2021    CREATININE 0.65 02/23/2021        Lab Results   Component Value Date    WBC 7.1 02/23/2021    HEMOGLOBIN 11.4 (L) 02/23/2021    HEMATOCRIT 35.8 (L) 02/23/2021    PLATELETCT 312 02/23/2021        Total time of the discharge process exceeds 32  thousand awaiting minutes.

## 2021-02-25 NOTE — CARE PLAN
Problem: Safety  Goal: Will remain free from falls  Outcome: PROGRESSING AS EXPECTED  Note: Bed alarm on. Bed in lowest, locked position. Call light and belongings within reach. Will continue hourly rounding.      Problem: Venous Thromboembolism (VTW)/Deep Vein Thrombosis (DVT) Prevention:  Goal: Patient will participate in Venous Thrombosis (VTE)/Deep Vein Thrombosis (DVT)Prevention Measures  Outcome: PROGRESSING AS EXPECTED  Note: Pt ambulates often. Apixaban on board. Will continue to monitor.

## 2021-02-25 NOTE — PROGRESS NOTES
Pt discharged to [facility] via transport. All belongings accounted for at time of discharge including clothes, shoes and ID. PIVs removed, dressing applied. Discharge education provided to pt, pt verbalized understanding. Report given to DAYDAY Dudley.

## 2021-02-25 NOTE — PROGRESS NOTES
Monitor summary: SR 89-96, MO .14, QRS .08, QT .36 with rare PACs per strip from monitor room.

## 2021-02-25 NOTE — PROGRESS NOTES
Monitor Summary: SR 84-98, AR -0.14, QRS -0.08, QT -0.42, with rare PVC per strip from the monitor room.

## 2021-02-25 NOTE — PROGRESS NOTES
Patient admitted to facility at 1410 via w/c, accompanied by hospital transport.  Patient assisted to room and positioned in bed for comfort and safety; call light within reach.  Patient assisted with stowing belongings and oriented to room and facility.  Admission assessment performed and documented in computer. Patient received and reviewed education binder. Admission paperwork completed; signed copies placed in chart.  Will continue to monitor.

## 2021-02-25 NOTE — DISCHARGE PLANNING
Dr. Bolanos has medically cleared.  Will discuss this case further with the Admissions Team this am for an anticipated admission.      1022-Dr. Flanagan has accepted Imani.  Transport has been arranged for 1300. Msg placed to Dr. Bolanos.  Marcy TANN, Joni benitez & Christianne SHIN are aware.

## 2021-02-25 NOTE — DISCHARGE INSTRUCTIONS
"Follow-up with PCP in 1 week  repeat echo in 2-3 months   Follow up with cardiology clinic in 1 month  Follow-up for neurology in a month      Discharge Instructions    Discharged to other by medical transportation with escort. Discharged via wheelchair, hospital escort: Yes.  Special equipment needed: Not Applicable    Be sure to schedule a follow-up appointment with your primary care doctor or any specialists as instructed.     Discharge Plan:   Diet Plan: Discussed  Activity Level: Discussed  Confirmed Follow up Appointment: Patient to Call and Schedule Appointment  Confirmed Symptoms Management: Discussed  Medication Reconciliation Updated: Yes  Influenza Vaccine Indication: Patient Refuses    I understand that a diet low in cholesterol, fat, and sodium is recommended for good health. Unless I have been given specific instructions below for another diet, I accept this instruction as my diet prescription.   Other diet: Puree, Nectar thick liquid      Special Instructions:     Stroke/CVA/TIA/Hemorrhagic Ischemia Discharge Instructions  You have had a stroke. Your risk factors have been identified as follows:  Age - Over 55  High blood pressure  Previous TIAs or \"mini strokes\"  Smoking  High Cholesterol and lipids  It is important that you reduce your risk factors to avoid another stroke in the future. Here are some general guidelines to follow:  · Eat healthy - avoid food high in fat.  · Get regular exercise.  · Maintain a healthy weight.  · Avoid smoking.  · Avoid alcohol and illegal drug use.  · Take your medications as directed.  For more information regarding risk factors, refer to pages 17-19 in your Stroke Patient Education Guide. Stroke Education Guide was given to patient.    Warning signs of a stroke include (which can also be found on page 3 of your Stroke Patient Education Guide):  · Sudden numbness of weakness of the face, arm or leg (especially on one side of the body).  · Sudden confusion, trouble " speaking or understanding.  · Sudden trouble seeing in one or both eyes.  · Sudden trouble walking, dizziness, loss of balance or coordination.  · Sudden severe headache with no known cause.  It is very important to get treatment quickly when a stroke occurs. If you experience any of the above warning signs, call [phone] immediately.     Some patients who have had a stroke will be going home on a blood thinner medication called Warfarin (Coumadin).  This medication requires very close monitoring and follow up.  This follow up can be provided by either your Primary Care Physician or by [facility] Outpatient Anticoagulation Service.  The Outpatient Anticoagulation Service is located at the [facility] at [facility] ([facility]).  If you do not know when your follow up appointment is scheduled, call [phone] to verify your appointment time.      · Is patient discharged on Warfarin / Coumadin?   No     Depression / Suicide Risk    As you are discharged from this [facility], it is important to learn how to keep safe from harming yourself.    Recognize the warning signs:  · Abrupt changes in personality, positive or negative- including increase in energy   · Giving away possessions  · Change in eating patterns- significant weight changes-  positive or negative  · Change in sleeping patterns- unable to sleep or sleeping all the time   · Unwillingness or inability to communicate  · Depression  · Unusual sadness, discouragement and loneliness  · Talk of wanting to die  · Neglect of personal appearance   · Rebelliousness- reckless behavior  · Withdrawal from people/activities they love  · Confusion- inability to concentrate     If you or a loved one observes any of these behaviors or has concerns about self-harm, here's what you can do:  · Talk about it- your feelings and reasons for harming yourself  · Remove any means that you might use to hurt yourself  (examples: pills, rope, extension cords, firearm)  · Get professional help from the community (Mental Health, Substance Abuse, psychological counseling)  · Do not be alone:Call your Safe Contact- someone whom you trust who will be there for you.  · Call your local CRISIS HOTLINE 091-5293 or 910-671-6425  · Call your local Children's Mobile Crisis Response Team [place] (004) 131-7093 or www.Mtivity  · Call the toll free National Suicide Prevention Hotlines   · National Suicide Prevention Lifeline 385-958-LBVV (1994)  · National Hope Line Network 800-SUICIDE (912-8552)

## 2021-02-26 ENCOUNTER — APPOINTMENT (OUTPATIENT)
Dept: RADIOLOGY | Facility: REHABILITATION | Age: 61
DRG: 056 | End: 2021-02-26
Attending: PHYSICAL MEDICINE & REHABILITATION
Payer: COMMERCIAL

## 2021-02-26 PROBLEM — I95.9 HYPOTENSION: Status: ACTIVE | Noted: 2021-02-26

## 2021-02-26 PROBLEM — E87.1 HYPONATREMIA: Status: ACTIVE | Noted: 2021-02-26

## 2021-02-26 PROBLEM — K76.89 HEPATOCELLULAR DYSFUNCTION: Status: ACTIVE | Noted: 2021-02-26

## 2021-02-26 LAB
25(OH)D3 SERPL-MCNC: 16 NG/ML (ref 30–100)
ALBUMIN SERPL BCP-MCNC: 3.2 G/DL (ref 3.2–4.9)
ALBUMIN/GLOB SERPL: 1 G/DL
ALP SERPL-CCNC: 74 U/L (ref 30–99)
ALT SERPL-CCNC: 17 U/L (ref 2–50)
ANION GAP SERPL CALC-SCNC: 6 MMOL/L (ref 7–16)
AST SERPL-CCNC: 12 U/L (ref 12–45)
BASOPHILS # BLD AUTO: 0.8 % (ref 0–1.8)
BASOPHILS # BLD: 0.05 K/UL (ref 0–0.12)
BILIRUB SERPL-MCNC: 0.4 MG/DL (ref 0.1–1.5)
BUN SERPL-MCNC: 16 MG/DL (ref 8–22)
CALCIUM SERPL-MCNC: 9.3 MG/DL (ref 8.5–10.5)
CHLORIDE SERPL-SCNC: 98 MMOL/L (ref 96–112)
CO2 SERPL-SCNC: 27 MMOL/L (ref 20–33)
CREAT SERPL-MCNC: 0.64 MG/DL (ref 0.5–1.4)
EOSINOPHIL # BLD AUTO: 0.23 K/UL (ref 0–0.51)
EOSINOPHIL NFR BLD: 3.9 % (ref 0–6.9)
ERYTHROCYTE [DISTWIDTH] IN BLOOD BY AUTOMATED COUNT: 48.4 FL (ref 35.9–50)
GLOBULIN SER CALC-MCNC: 3.1 G/DL (ref 1.9–3.5)
GLUCOSE SERPL-MCNC: 143 MG/DL (ref 65–99)
HCT VFR BLD AUTO: 39.9 % (ref 37–47)
HGB BLD-MCNC: 12.3 G/DL (ref 12–16)
IMM GRANULOCYTES # BLD AUTO: 0.02 K/UL (ref 0–0.11)
IMM GRANULOCYTES NFR BLD AUTO: 0.3 % (ref 0–0.9)
LYMPHOCYTES # BLD AUTO: 2.13 K/UL (ref 1–4.8)
LYMPHOCYTES NFR BLD: 35.7 % (ref 22–41)
MAGNESIUM SERPL-MCNC: 1.8 MG/DL (ref 1.5–2.5)
MCH RBC QN AUTO: 29.5 PG (ref 27–33)
MCHC RBC AUTO-ENTMCNC: 30.8 G/DL (ref 33.6–35)
MCV RBC AUTO: 95.7 FL (ref 81.4–97.8)
MONOCYTES # BLD AUTO: 0.54 K/UL (ref 0–0.85)
MONOCYTES NFR BLD AUTO: 9 % (ref 0–13.4)
NEUTROPHILS # BLD AUTO: 3 K/UL (ref 2–7.15)
NEUTROPHILS NFR BLD: 50.3 % (ref 44–72)
NRBC # BLD AUTO: 0 K/UL
NRBC BLD-RTO: 0 /100 WBC
NT-PROBNP SERPL IA-MCNC: 824 PG/ML (ref 0–125)
PLATELET # BLD AUTO: 355 K/UL (ref 164–446)
PMV BLD AUTO: 10.6 FL (ref 9–12.9)
POTASSIUM SERPL-SCNC: 4.2 MMOL/L (ref 3.6–5.5)
PROT SERPL-MCNC: 6.3 G/DL (ref 6–8.2)
RBC # BLD AUTO: 4.17 M/UL (ref 4.2–5.4)
SARS-COV-2 RNA RESP QL NAA+PROBE: NOTDETECTED
SODIUM SERPL-SCNC: 131 MMOL/L (ref 135–145)
SPECIMEN SOURCE: NORMAL
WBC # BLD AUTO: 6 K/UL (ref 4.8–10.8)

## 2021-02-26 PROCEDURE — 36415 COLL VENOUS BLD VENIPUNCTURE: CPT

## 2021-02-26 PROCEDURE — 700102 HCHG RX REV CODE 250 W/ 637 OVERRIDE(OP): Performed by: PHYSICAL MEDICINE & REHABILITATION

## 2021-02-26 PROCEDURE — 770010 HCHG ROOM/CARE - REHAB SEMI PRIVAT*

## 2021-02-26 PROCEDURE — 83880 ASSAY OF NATRIURETIC PEPTIDE: CPT

## 2021-02-26 PROCEDURE — 71045 X-RAY EXAM CHEST 1 VIEW: CPT

## 2021-02-26 PROCEDURE — 99233 SBSQ HOSP IP/OBS HIGH 50: CPT | Performed by: PHYSICAL MEDICINE & REHABILITATION

## 2021-02-26 PROCEDURE — 80053 COMPREHEN METABOLIC PANEL: CPT

## 2021-02-26 PROCEDURE — 94760 N-INVAS EAR/PLS OXIMETRY 1: CPT

## 2021-02-26 PROCEDURE — 83735 ASSAY OF MAGNESIUM: CPT

## 2021-02-26 PROCEDURE — 97165 OT EVAL LOW COMPLEX 30 MIN: CPT

## 2021-02-26 PROCEDURE — 97535 SELF CARE MNGMENT TRAINING: CPT

## 2021-02-26 PROCEDURE — A9270 NON-COVERED ITEM OR SERVICE: HCPCS | Performed by: PHYSICAL MEDICINE & REHABILITATION

## 2021-02-26 PROCEDURE — 92610 EVALUATE SWALLOWING FUNCTION: CPT | Performed by: SPEECH-LANGUAGE PATHOLOGIST

## 2021-02-26 PROCEDURE — 85025 COMPLETE CBC W/AUTO DIFF WBC: CPT

## 2021-02-26 PROCEDURE — 97162 PT EVAL MOD COMPLEX 30 MIN: CPT

## 2021-02-26 PROCEDURE — 97116 GAIT TRAINING THERAPY: CPT

## 2021-02-26 PROCEDURE — 99254 IP/OBS CNSLTJ NEW/EST MOD 60: CPT | Performed by: HOSPITALIST

## 2021-02-26 PROCEDURE — 92523 SPEECH SOUND LANG COMPREHEN: CPT | Performed by: SPEECH-LANGUAGE PATHOLOGIST

## 2021-02-26 PROCEDURE — 82306 VITAMIN D 25 HYDROXY: CPT

## 2021-02-26 RX ORDER — FUROSEMIDE 20 MG/1
10 TABLET ORAL
Status: DISCONTINUED | OUTPATIENT
Start: 2021-02-27 | End: 2021-03-01

## 2021-02-26 RX ORDER — POTASSIUM CHLORIDE 20 MEQ/1
10 TABLET, EXTENDED RELEASE ORAL DAILY
Status: DISCONTINUED | OUTPATIENT
Start: 2021-02-27 | End: 2021-03-01

## 2021-02-26 RX ORDER — LISINOPRIL 5 MG/1
2.5 TABLET ORAL
Status: DISCONTINUED | OUTPATIENT
Start: 2021-02-27 | End: 2021-03-01

## 2021-02-26 RX ADMIN — FUROSEMIDE 20 MG: 20 TABLET ORAL at 05:33

## 2021-02-26 RX ADMIN — APIXABAN 5 MG: 5 TABLET, FILM COATED ORAL at 09:36

## 2021-02-26 RX ADMIN — CARVEDILOL 3.12 MG: 3.12 TABLET, FILM COATED ORAL at 09:36

## 2021-02-26 RX ADMIN — ATORVASTATIN CALCIUM 80 MG: 40 TABLET, FILM COATED ORAL at 21:24

## 2021-02-26 RX ADMIN — SENNOSIDES AND DOCUSATE SODIUM 2 TABLET: 8.6; 5 TABLET ORAL at 09:36

## 2021-02-26 RX ADMIN — APIXABAN 5 MG: 5 TABLET, FILM COATED ORAL at 21:24

## 2021-02-26 RX ADMIN — LISINOPRIL 5 MG: 5 TABLET ORAL at 05:33

## 2021-02-26 RX ADMIN — POTASSIUM CHLORIDE 20 MEQ: 1500 TABLET, EXTENDED RELEASE ORAL at 09:36

## 2021-02-26 RX ADMIN — CARVEDILOL 3.12 MG: 3.12 TABLET, FILM COATED ORAL at 18:11

## 2021-02-26 RX ADMIN — SENNOSIDES AND DOCUSATE SODIUM 2 TABLET: 8.6; 5 TABLET ORAL at 21:25

## 2021-02-26 ASSESSMENT — BRIEF INTERVIEW FOR MENTAL STATUS (BIMS)
WHAT MONTH IS IT: ACCURATE WITHIN 5 DAYS
ASKED TO RECALL BED: YES, NO CUE REQUIRED
WHAT DAY OF THE WEEK IS IT: NO ANSWER
ASKED TO RECALL SOCK: NO, COULD NOT RECALL
WHAT YEAR IS IT: MISSED BY MORE THAN 5 YEARS
BIMS SUMMARY SCORE: 9
ASKED TO RECALL BLUE: YES, NO CUE REQUIRED
INITIAL REPETITION OF BED BLUE SOCK - FIRST ATTEMPT: 3

## 2021-02-26 ASSESSMENT — GAIT ASSESSMENTS
GAIT LEVEL OF ASSIST: MINIMAL ASSIST
DEVIATION: DECREASED BASE OF SUPPORT;BRADYKINETIC;SHUFFLED GAIT;DECREASED HEEL STRIKE;DECREASED TOE OFF

## 2021-02-26 ASSESSMENT — CHA2DS2 SCORE
AGE 75 OR GREATER: NO
SEX: MALE
PRIOR STROKE OR TIA OR THROMBOEMBOLISM: YES
AGE 65 TO 74: NO
HYPERTENSION: YES
CHF OR LEFT VENTRICULAR DYSFUNCTION: YES
VASCULAR DISEASE: YES
DIABETES: NO
CHA2DS2 VASC SCORE: 5

## 2021-02-26 ASSESSMENT — ACTIVITIES OF DAILY LIVING (ADL)
BED_CHAIR_WHEELCHAIR_TRANSFER_DESCRIPTION: INCREASED TIME;SUPERVISION FOR SAFETY
TOILETING: INDEPENDENT
TOILET_TRANSFER_DESCRIPTION: GRAB BAR;INCREASED TIME;INITIAL PREPARATION FOR TASK;SET-UP OF EQUIPMENT;SUPERVISION FOR SAFETY;VERBAL CUEING
TUB_SHOWER_TRANSFER_DESCRIPTION: GRAB BAR;SHOWER BENCH;INCREASED TIME;INITIAL PREPARATION FOR TASK;SET-UP OF EQUIPMENT;SUPERVISION FOR SAFETY;VERBAL CUEING
BED_CHAIR_WHEELCHAIR_TRANSFER_DESCRIPTION: INCREASED TIME;INITIAL PREPARATION FOR TASK;SET-UP OF EQUIPMENT;SQUAT PIVOT TRANSFER TO WHEELCHAIR;SUPERVISION FOR SAFETY;VERBAL CUEING
TOILETING_LEVEL_OF_ASSIST_DESCRIPTION: GRAB BAR;INCREASED TIME;INITIAL PREPARATION FOR TASK;SET-UP OF EQUIPMENT;SUPERVISION FOR SAFETY;VERBAL CUEING
TOILET_TRANSFER_DESCRIPTION: INCREASED TIME;SUPERVISION FOR SAFETY;VERBAL CUEING

## 2021-02-26 NOTE — CARE PLAN
Problem: Safety  Goal: Will remain free from injury  Outcome: PROGRESSING AS EXPECTED  Note: Patient demonstrates good safety technique this shift.  Asks for assistance when needed and does not attempt self transfer.  Able to verbalize needs.      Problem: Respiratory:  Goal: Respiratory status will improve  Outcome: PROGRESSING AS EXPECTED  Note: Pt is on 2L, with sp 02 97%, breath sounds are diminished.

## 2021-02-26 NOTE — ASSESSMENT & PLAN NOTE
Echo EF 25% w/ mod MS  Cardiology suspects CDM 2/2 Meth abuse  Discontinued Coreg for hypotension  Needs Cardiology F/U

## 2021-02-26 NOTE — PROGRESS NOTES
2 RN skin check done with admitting RN Octavia and RN Keyshawn. Face photo and skin photos documented in media. Appropriate LDAs opened.   Pt with Gary score of 18, RN wound protocol in place, orders current. Prevention measures in place including,mepiles to sacrum for protection.

## 2021-02-26 NOTE — THERAPY
Speech Language Pathology   Initial Assessment     Patient Name: Imani Stephen  AGE:  60 y.o., SEX:  female  Medical Record #: [id number]  Today's Date: 2/26/2021     Subjective    Patient was seen for dysphagia evaluation during breakfast and cognitive-linguistic evaluation immediately after. Patient was pleasant and cooperative with eval.      Objective       02/26/21 0801   Precautions   Precautions Fall Risk;Swallow Precautions ( See Comments)   Comments thickened liquids   Prior Living Situation   Prior Services Home-Independent   Prior Level Of Function   Communication Within Functional Limits   Swallow Within Functional Limits   Dentition Edentulous   Dentures None   Hearing Within Functional Limits   Vision Within Functional Limits for Evaluation   Patient's Primary Language English   Education Other (See Comments)  (unknown)   Receptive Language / Auditory Comprehension   Identifies Objects Minimal (4)   Answers Yes / No Personal Questions Minimal (4)   Answers Yes / No Simple / Contextual Questions Moderate (3)   Follows One Unit Commands Moderate (3)   Follows Two Unit Commands Severe (2)   Understands Simple, Structured Conversation  Moderate (3)   Understands Complex Conversation Severe (2)   Expressive Language   Naming Severe (2)   Automatic Language Appropriate Moderate (3)   Verbalizes Wants / Needs Moderate (3)   Word Finding Deficits Severe (2)   Reading Comprehension    Reading Words Within Functional Limits (6-7)   Reading Sentences Moderate (3)   Following Written Direction Moderate (3)   Written Language Expression   Dominant Hand Right   To Dictation Moderate (3)   Functional Writing (Name, Address) Minimal (4)   Spelling Accuracy Moderate (3)   Legibility Minimal (4)   Cognition   Attention to Task Supervision (5)   Orientation  Moderate (3)   Verbal Short Term Memory Unable To Fully Assess Due To Speech / Language Deficits   Visual Short Term Memory Moderate (3)   Functional Memory  Activities Moderate (3)   Simple Reasoning / Problem Solving Moderate (3)   Functional Problem Solving Severe (2)   Executive Functioning / Organization Severe (2)   Verbal Sequencing (Simple) Severe (2)   Clock Drawing Impaired Hand Placement   ABS (Agitated Behavior Scale)   Agitated Behavior Scale Performed No   Social / Pragmatic Communication   Eye Contact Within Functional Limits (6-7)   Tracheostomy   Tracheostomy No   Speech Mechanisms / Voice Production   Voice Quality Clear   Oral Movements Are Voluntary And Coordinated Moderate (3)   Speaks Fluently Moderate (3)   Single Word Intelligibility Minimal (4)   Sentence Level Intelligibility Moderate (3)   Labial Function   Labial Structure At Rest Within Functional Limits   Labial Vowel Production / I /, / U / Minimal   Labial Sequence / I /, / U / Minimal   Lingual Function   Lingual Structure At Rest Within Functional Limits   Lingual Protrude Within Functional Limits   Elevate Outside Mouth Within Functional Limits   Lateralization Minimal Right;Minimal Left   Jaw Function   Jaw Structure At Rest Within Functional Limits   Chew (Rotary) Within Functional Limits   Velar Function   Velar Structure At Rest Within Functional Limits   / A / Prolonged Not Tested   Laryngeal Function   Voice Quality Within Functional Limits   Volutional Cough Not Tested  (unable to follow cue)   Excursion Upon Swallow Complete   Swallowing   Ice Chips Within Functional Limits   Thick Nectar / Honey / Liquid Within Functional Limits   Pureed (4) Within Functional Limits   Thin Liquid Moderate   Masticated Foods Moderate   Dysphagia Strategies / Recommendations   Compensatory Strategies Assistance Needed for Meal Tray Set-up;Head of Bed 90 Degrees During Eating / Drinking;No Straws   Diet / Liquid Recommendation Mildly Thick (2) - (Nectar Thick);Minced & Moist (5) - (Dysphagia II)   Medication Administration  Other (See Comments)  (whole 1:1 with MTL)   Barriers To Oral Feeding    Barriers To Oral Feeding Impaired Cognition / Attention   Swallowing/Nutritional Status   Swallowing/Nutritional Status Modified food consistency   Functional Level of Assist   Eating Stand by Assist   Eating Description Modified diet;Increased time;Verbal cueing   Comprehension Moderate Assist   Comprehension Description Verbal cues   Expression Moderate Assist   Expression Description Verbal cueing   Social Interaction Supervision   Social Interaction Description Verbal cues;Schedule   Problem Solving Maximal Assist   Problem Solving Description Increased time;Therapy schedule;Verbal cueing   Memory Maximal Assist   Memory Description Increased time;Therapy schedule;Verbal cueing   SCCAN (Scales of Cognitive and Communicative Ability for Neurorehabilitation)   Oral Expression - Raw Score 7   Oral Expression - Scale Performance Score 37   Orientation - Raw Score 5   Orientation - Scale Performance Score 42   Memory - Raw Score 4   Memory - Scale Performance Score 21   Speech Comprehension - Raw Score 8   Speech Comprehension - Scale Performance Score 62   Reading Comprehension - Raw Score 6   Reading Comprehension - Scale Performance Score 50   Writing - Raw Score 4   Writing - Scale Performance Score 57   Attention - Raw Score 7   Attention - Scale Performance Score 44   Problem Solving - Raw Score 10   Problem Solving - Scale Performance Score 43   SCCAN Total Raw Score 41   SCCAN Degree of Severity Severe Impairment   Problem List   Problem List Aphasia;Cognitive-Linguistic Deficits   Current Discharge Plan   Current Discharge Plan Unknown at this Time   Benefit   Therapy Benefit Patient would benefit from Inpatient Rehab Speech-Language Pathology to address above identified deficits.   Interdisciplinary Plan of Care Collaboration   IDT Collaboration with  Nursing   Patient Position at End of Therapy In Bed;Call Light within Reach;Tray Table within Reach   Collaboration Comments results and recs   SLP Total  "Time Spent   SLP Individual Total Time Spent (Mins) 90   Evaluation Charges   Charges Yes   SLP Speech Language Evaluation Speech Sound Language Comprehension   SLP Oral Pharyngeal Evaluation Oral Pharyngeal Evaluation       Assessment    Patient is 60 y.o. female with a diagnosis of CVA.  Additional factors influencing patient status/progress (ie: cognitive factors, co-morbidities, social support, etc): Unknown PLOF.    Per H&P: \"The patient is a 60 y.o. female with a past medical history of anxiety, depression, Crohn’s disease, meth use, tobacco use;  who presented on 2/20/2021  3:49 PM right hemiplegia and dysphagia, found to have left MCA M1 thrombus s/p TPA and IR mechanical thrombectomy on 2/20. CT with incidental multiple bilateral thyroid nodules and pleural effusions. Hospital course with cardiomyopathy with EF 25%.\"    Dysphagia: Patient was unable to follow verbal commands for oral mech exam but imitated SLP's oral motor movements. Labial/ lingual structures appear to be symmetrical with intact ROM but mildly impaired coordination. SLP provided PO trials of thin, mildly thick, puree, minced/moist, soft textures and dissolvable solids. Patient is edentulous, resulting in increased oral transit time with all solids. However, patient was able to report that she eats \"regular, whatever I want\" food at home. Otherwise, oral phase of the swallow is intact. Patient presents with moderate suspected pharyngeal dysphagia as characterized by possible delayed swallow response and multiple swallows per bolus upon palpation of the swallow. Patient also presented cough response with thin liquids via small cup sips and throat clearing with advanced solids. Patient was observed to self-feed without impulsivity.     Cog/S/L: SLP administered the Scales of Cognitive and Communicative Abilities for Neurorehabilitation (SCCAN). Patient scored 41, indicating severe deficits (see chart above for details). Patient was able to " attend to the task to complete the assessment. She also verbalized answers in the same category for orientation questions (year: 2020, president: Carmella) but required SLP cues to remain OX4. Patient demonstrated improved visual memory versus verbal memory but overall memory deficits are in the severe range. Patient was able to point to simple pictures and follow one step commands but was unable to follow a 2-step direction. Patient also verbalized yes/no and single word answers when given extended response time. However, she also frequently shook her head No when asked questions. Patient presents with severe word-finding deficits. She was able to label objects but not label a described item or give items in a category. Patient was bale to write her name but not single words to dictation. She also identified single written words but was unable to match a written sentence to a picture.           Plan  -Minced/Moist textures with Mildly thick liquids.   -Assist with set-up and positioning prior to all PO.  -Administer the Western Aphasia Battery (WAB) secondary to aphasia noted during assessment.    Recommend Speech Therapy  minutes per day 5-6 days per week for 4 weeks for the following treatments:  SLP Speech Language Treatment, SLP Swallowing Dysfunction Treatment, SLP Oral Pharyngeal Evaluation, SLP Cognitive Skill Development and SLP Group Treatment.    Goals:  Long term and short term goals have been discussed with patient and they are in agreement.    Speech Therapy Problems     Problem: Comprehension STGs     Dates: Start: 02/26/21       Goal: STG-Within one week, patient will     Dates: Start: 02/26/21       Description: 1) Individualized goal:  follow simple 2-step verbal directions with 90% accuracy and minimal cues  2) Interventions:  SLP Speech Language Treatment, SLP Self Care / ADL Training , SLP Cognitive Skill Development, and SLP Group Treatment                    Problem: Expression STGs      Dates: Start: 02/26/21       Goal: STG-Within one week, patient will     Dates: Start: 02/26/21       Description: 1) Individualized goal:  use functional phrases to effectively communicate wants/needs  2) Interventions:  SLP Speech Language Treatment, SLP Self Care / ADL Training , SLP Cognitive Skill Development, and SLP Group Treatment                    Problem: Memory STGs     Dates: Start: 02/26/21       Goal: STG-Within one week, patient will     Dates: Start: 02/26/21       Description: 1) Individualized goal:  use external memory aids (calendar, memory book, white board, etc.) to remain OX4 for 90% of opportunities and minimal cues  2) Interventions:  SLP Speech Language Treatment, SLP Self Care / ADL Training , SLP Cognitive Skill Development, and SLP Group Treatment                    Problem: Speech/Swallowing LTGs     Dates: Start: 02/26/21       Goal: LTG-By discharge, patient will safely swallow     Dates: Start: 02/26/21       Description: 1) Individualized goal:  least restrictive diet without overt s/sx of asp for 100% of PO intake  2) Interventions:  SLP Swallowing Dysfunction Treatment, SLP Oral Pharyngeal Evaluation, SLP Self Care / ADL Training , and SLP Group Treatment              Goal: LTG-By discharge, patient will     Dates: Start: 02/26/21       Description: 1) Individualized goal: improve cognitive linguistic skills from the severe range to the moderate range as indicated by formal assessment and functional abilities  2) Interventions:  SLP Speech Language Treatment, SLP Self Care / ADL Training , SLP Cognitive Skill Development, and SLP Group Treatment                    Problem: Swallowing STGs     Dates: Start: 02/26/21       Goal: STG-Within one week, patient will     Dates: Start: 02/26/21       Description: 1) Individualized goal:  tolerate minced/moist textures with mildly thick liquids without overt s/sx of sap for 100% of PO intake   2) Interventions:  SLP Swallowing Dysfunction  Treatment, SLP Oral Pharyngeal Evaluation, SLP Self Care / ADL Training , and SLP Group Treatment

## 2021-02-26 NOTE — THERAPY
"Occupational Therapy   Initial Evaluation     Patient Name: Imani Stephen  Age:  60 y.o., Sex:  female  Medical Record #: [id number]  Today's Date: 2/26/2021     Subjective    \"bathroom.\" pt reported at start of session. Pt able to verbalize 1-2 word sentences intermittently.      Objective       02/26/21 0701   Prior Living Situation   Prior Services Home-Independent   Housing / Facility Unable To Determine At This Time   Steps Into Home   (unable to determine at this time)   Bathroom Set up Walk In Shower   Equipment Owned None   Lives with - Patient's Self Care Capacity Alone and Able to Care For Self   Comments pt is a poor historian 2/2 expressive aphasia    Prior Level of ADL Function   Self Feeding Independent   Grooming / Hygiene Independent   Bathing Independent   Dressing Independent   Toileting Independent   Comments as per pt, independent at home   Prior Level of IADL Function   Medication Management Independent   Laundry Independent   Kitchen Mobility Independent   Finances Independent   Home Management Independent   Shopping Other (Comments)  (pt reported friend, Joni does shopping)   Prior Level Of Mobility Independent Without Device in Community;Independent Without Device in Home   Driving / Transportation Relatives / Others Provide Transportation  (friend provides transportation)   Occupation (Pre-Hospital Vocational) Unable To Determine At This Time   Prior Functioning: Everyday Activities   Self Care Independent   Indoor Mobility (Ambulation) Independent   Stairs Independent   Functional Cognition Independent   Prior Device Use None of the given options   Pain   Intervention Declines   Pain 0 - 10 Group   Pain Rating Scale (NPRS) 0   Therapist Pain Assessment Post Activity Pain Same as Prior to Activity   Cognition    Speech/ Communication Expressive Aphasia   Orientation Level Not Oriented to Reason   Level of Consciousness Alert   Ability To Follow Commands 2 Step   Safety Awareness Impaired " "  Attention Impaired   ABS (Agitated Behavior Scale)   Agitated Behavior Scale Performed No   Cognitive Pattern Assessment   Cognitive Pattern Assessment Used BIMS   Brief Interview for Mental Status (BIMS)   Repetition of Three Words (First Attempt) 3   Temporal Orientation: Year Missed by more than 5 years  (2000)   Temporal Orientation: Month Accurate within 5 days   Temporal Orientation: Day No answer   Recall: \"Sock\" No, could not recall   Recall: \"Blue\" Yes, no cue required   Recall: \"Bed\" Yes, no cue required   BIMS Summary Score 9   Vision Screen   Vision Not tested  (pt reported no blurry/double vision)   Passive ROM Upper Body   Passive ROM Upper Body WDL   Active ROM Upper Body   Active ROM Upper Body  WDL   Dominant Hand Right   Strength Upper Body   Upper Body Strength  WDL   Sensation Upper Body   Upper Extremity Sensation  WDL   Comments pt reported no numbness/tingling    Upper Body Muscle Tone   Upper Body Muscle Tone  WDL   Balance Assessment   Sitting Balance (Static) Fair   Sitting Balance (Dynamic) Fair   Standing Balance (Static) Fair   Standing Balance (Dynamic) Fair   Weight Shift Sitting Fair   Weight Shift Standing Fair   Bed Mobility    Supine to Sit Contact Guard Assist   Sit to Supine Contact Guard Assist   Sit to Stand Contact Guard Assist   Scooting Stand by Assist   Rolling Supervised   Coordination Upper Body   Coordination WDL   Eating   Assistance Needed Supervision;Verbal cues   CARE Score 4   Eating Discharge Goal   Discharge Goal 6   Oral Hygiene   Assistance Needed Supervision;Set-up / clean-up   CARE Score 4   Oral Hygiene Discharge Goal   Discharge Goal 6   Shower/Bathe Self   Assistance Needed Incidental touching;Physical assistance;Supervision;Set-up / clean-up;Verbal cues   Physical Assistance Level 25% or less   CARE Score 3   Shower/Bathe Self Discharge Goal   Discharge Goal 6   Upper Body Dressing   Assistance Needed Supervision;Verbal cues;Set-up / clean-up   CARE " Score 4   Upper Body Dressing Discharge Goal   Discharge Goal 6   Lower Body Dressing   Assistance Needed Physical assistance;Incidental touching   Physical Assistance Level 26%-50%   CARE Score 3   Lower Body Dressing Discharge Goal   Discharge Goal 6   Putting On/Taking Off Footwear   Assistance Needed Physical assistance;Incidental touching   Physical Assistance Level 26%-50%   CARE Score 3   Putting On/Taking Off Footwear Discharge Goal   Discharge Goal 6   Toileting Hygiene   Assistance Needed Physical assistance;Incidental touching   Physical Assistance Level 25% or less   CARE Score 3   Toileting Hygiene Discharge Goal   Discharge Goal 6   Toilet Transfer   Assistance Needed Physical assistance;Incidental touching   Physical Assistance Level 25% or less   CARE Score 3   Toilet Transfer Discharge Goal   Discharge Goal 6   Hearing, Speech, and Vision   Expression of Ideas and Wants Some difficulty   Understanding Verbal and Non-Verbal Content Usually understands   Functional Level of Assist   Eating Stand by Assist   Eating Description Modified diet;Increased time;Set-up of equipment or meal/tube feeding;Supervision for safety;Verbal cueing   Grooming Supervision   Grooming Description Increased time;Initial preparation for task;Seated in wheelchair at sink;Set-up of equipment;Supervision for safety;Verbal cueing   Bathing Contact Guard Assist   Bathing Description Grab bar;Hand held shower;Increased time;Initial preparation for task;Set-up of equipment;Supervision for safety;Verbal cueing   Upper Body Dressing Stand by Assist   Upper Body Dressing Description Increased time;Initial preparation for task;Set-up of equipment;Supervision for safety;Verbal cueing   Lower Body Dressing Minimal Assist   Lower Body Dressing Description Grab bar;Increased time;Initial preparation for task;Set-up of equipment;Supervision for safety;Verbal cueing   Toileting Contact Guard Assist   Toileting Description Grab bar;Increased  time;Initial preparation for task;Set-up of equipment;Supervision for safety;Verbal cueing   Bed, Chair, Wheelchair Transfer Contact Guard Assist   Bed Chair Wheelchair Transfer Description Increased time;Initial preparation for task;Set-up of equipment;Squat pivot transfer to wheelchair;Supervision for safety;Verbal cueing   Toilet Transfers Contact Guard Assist   Toilet Transfer Description Grab bar;Increased time;Initial preparation for task;Set-up of equipment;Supervision for safety;Verbal cueing   Tub / Shower Transfers Contact Guard Assist   Tub Shower Transfer Description Grab bar;Shower bench;Increased time;Initial preparation for task;Set-up of equipment;Supervision for safety;Verbal cueing   Problem List   Problem List Decreased Active Daily Living Skills;Decreased Homemaking Skills;Decreased Functional Mobility;Decreased Activity Tolerance;Safety Awareness Deficits / Cognition;Impaired Cognitive Function;Impaired Postural Control / Balance   Precautions   Precautions Fall Risk;Swallow Precautions ( See Comments)   Comments thickened liquids; expressive aphasia    Current Discharge Plan   Current Discharge Plan Return to Prior Living Situation   Benefit    Therapy Benefit Patient Would Benefit from Inpatient Rehab Occupational Therapy to Maximize [place] with ADLs, IADLs and Functional Mobility.   Interdisciplinary Plan of Care Collaboration   Patient Position at End of Therapy In Bed;Bed Alarm On;Call Light within Reach;Tray Table within Reach;Phone within Reach   Strengths & Barriers   Strengths Able to follow instructions;Independent prior level of function;Manages pain appropriately;Motivated for self care and independence;Pleasant and cooperative;Willingly participates in therapeutic activities   Barriers Aphasia expressive;Decreased endurance;Generalized weakness;Impaired activity tolerance;Impaired balance;Impaired functional cognition   OT Total Time Spent   OT Individual Total Time Spent  (Mins) 60   OT Charge Group   OT Self Care / ADL 1   OT Evaluation OT Evaluation Low       Assessment  Patient is 60 y.o. female with a diagnosis of L MCA stroke. Past medical history of anxiety, depression, Crohn’s disease, meth use, tobacco use;  who presented on 2/20/2021  3:49 PM right hemiplegia and dysphagia, found to have left MCA M1 thrombus s/p TPA and IR mechanical thrombectomy on 2/20. CT with incidental multiple bilateral thyroid nodules and pleural effusions. Hospital course with cardiomyopathy with EF 25%.    Pt's history is unknown at this time 2/2 pt is a poor historian- will need to confirm. As per pt, lives in [place] , no WALT. Pt was independent with all ADL's and IADL's, except shopping/driving- friend assisted with these tasks. Pt did not use AD for ambulation. Pt has walk-in shower at home. Pt currently presents with expressive aphasia and not oriented to reason for being in rehab, some STM recall deficits, decreased safety awareness, decreased standing bal/tolerance, and increased need for assist with ADL's and functional mobility tasks. Pt able to perform all functional mobility at SBA-CGA level during eval and supervision-min a for all ADL tasks.     Plan  Recommend Occupational Therapy  minutes per day 5-7 days per week for 2 weeks for the following treatments:  OT Self Care/ADL, OT Cognitive Skill Dev, OT Community Reintegration, OT Manual Ther Technique, OT Neuro Re-Ed/Balance, OT Therapeutic Activity, OT Evaluation and OT Therapeutic Exercise.    Goals:  Long term and short term goals have been discussed with patient and they are in agreement.    Occupational Therapy Goals     Problem: Dressing     Dates: Start: 02/26/21       Goal: STG-Within one week, patient will dress LB     Dates: Start: 02/26/21       Description: 1) Individualized Goal:  with SBA   2) Interventions:  OT Self Care/ADL, OT Cognitive Skill Dev, OT Community Reintegration, OT Manual Ther Technique, OT Neuro  Re-Ed/Balance, OT Therapeutic Activity, OT Evaluation, and OT Therapeutic Exercise                  Problem: Functional Transfers     Dates: Start: 02/26/21       Goal: STG-Within one week, patient will transfer to toilet     Dates: Start: 02/26/21       Description: 1) Individualized Goal:  with SBA   2) Interventions:  OT Self Care/ADL, OT Cognitive Skill Dev, OT Community Reintegration, OT Manual Ther Technique, OT Neuro Re-Ed/Balance, OT Therapeutic Activity, OT Evaluation, and OT Therapeutic Exercise                Problem: OT Long Term Goals     Dates: Start: 02/26/21       Goal: LTG-By discharge, patient will complete basic self care tasks     Dates: Start: 02/26/21       Description: 1) Individualized Goal:  with mod I to supervision level   2) Interventions:  OT Self Care/ADL, OT Cognitive Skill Dev, OT Community Reintegration, OT Manual Ther Technique, OT Neuro Re-Ed/Balance, OT Therapeutic Activity, OT Evaluation, and OT Therapeutic Exercise          Goal: LTG-By discharge, patient will perform bathroom transfers     Dates: Start: 02/26/21       Description: 1) Individualized Goal:  with mod I to supervision level   2) Interventions:  OT Self Care/ADL, OT Cognitive Skill Dev, OT Community Reintegration, OT Manual Ther Technique, OT Neuro Re-Ed/Balance, OT Therapeutic Activity, OT Evaluation, and OT Therapeutic Exercise                Problem: Toileting     Dates: Start: 02/26/21       Goal: STG-Within one week, patient will complete toileting tasks     Dates: Start: 02/26/21       Description: 1) Individualized Goal:  with SBA   2) Interventions:  OT Self Care/ADL, OT Cognitive Skill Dev, OT Community Reintegration, OT Manual Ther Technique, OT Neuro Re-Ed/Balance, OT Therapeutic Activity, OT Evaluation, and OT Therapeutic Exercise
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
## 2021-02-26 NOTE — THERAPY
Physical Therapy   Initial Evaluation     Patient Name: Imani Stephen  Age:  60 y.o., Sex:  female  Medical Record #: [id number]  Today's Date: 2/26/2021     Subjective    Pt received supine in bed. Pt reported she did not feel dizzy when BP measured low. Pt agreeable to tx, able to answer yes/no questions with 75-90% accuracy.      Objective       02/26/21 1031   Cosignature   Documentation Review Approved    Prior Living Situation   Prior Services Unable To Determine At This Time   Housing / Facility Unable To Determine At This Time   Steps In Home   (Unable To Determine At This Time; none per pt report)   Rail Unable To Determine At This Time   Bathroom Set up   (Unable To Determine At This Time; none per pt report)   Equipment Owned Unable to Determine At This Time  (None per pt report)   Lives with - Patient's Self Care Capacity Unable To Determine At This Time   Comments Pt is poor historian due to expressive aphasia    Prior Level of Functional Mobility   Bed Mobility Unable To Determine At This Time   Transfer Status Unable To Determine At This Time   Ambulation Unable To Determine At This Time   Assistive Devices Used Unable to Determine At This Time   Wheelchair Unable To Determine At This Time   Stairs Unable To Determine At This Time   Prior Functioning: Everyday Activities   Self Care Unknown   Indoor Mobility (Ambulation) Unknown   Stairs Unknown   Functional Cognition Unknown   Vitals   Pulse 94   Patient BP Position Sitting   Blood Pressure (!) 87/58   Pulse Oximetry 97 %   O2 (LPM) 2   O2 Delivery Device Nasal Cannula   Cognition    Level of Consciousness Alert   ABS (Agitated Behavior Scale)   Agitated Behavior Scale Performed No   Active ROM Lower Body    Active ROM Lower Body  WDL   Strength Lower Body   Lower Body Strength  X   Rt Hip Flexion Strength   (Unable to assess due to cognition)   Rt Hip Abduction Strength   (Unable to assess due to cognition)   Rt Hip Adduction Strength   (Unable to  assess due to cognition)   Sensation Lower Body   Lower Extremity Sensation   WDL   Comments   (Unable to assess sharp/dull due to aphasia)   Lower Body Muscle Tone   Lower Body Muscle Tone  Not Tested   Bed Mobility    Supine to Sit Stand by Assist   Sit to Supine Stand by Assist   Sit to Stand Contact Guard Assist   Scooting Stand by Assist   Rolling Supervised   Neurological Concerns   Neurological Concerns No   Comments   (Neg clonus, neg babinski both sides. Unable to assess prop.)   Coordination Lower Body    Coordination Lower Body  Not Tested   Roll Left and Right   Assistance Needed Supervision   CARE Score 4   Roll Left and Right Discharge Goal   Discharge Goal 6   Sit to Lying   Assistance Needed Supervision   CARE Score 4   Sit to Lying Discharge Goal   Discharge Goal 6   Lying to Sitting on Side of Bed   Assistance Needed Supervision   CARE Score 4   Lying to Sitting on Side of Bed Discharge Goal   Discharge Goal 6   Sit to Stand   Assistance Needed Incidental touching   CARE Score 4   Sit to Stand Discharge Goal   Discharge Goal 4   Chair/Bed-to-Chair Transfer   Assistance Needed Incidental touching   CARE Score 4   Chair/Bed-to-Chair Transfer Discharge Goal   Discharge Goal 4   Toilet Transfer   Assistance Needed Incidental touching   CARE Score 4   Toilet Transfer Discharge Goal   Discharge Goal 4   Car Transfer   Reason if not Attempted Safety concerns   CARE Score 88   Car Transfer Discharge Goal   Discharge Goal 4   Walk 10 Feet   Assistance Needed Physical assistance   Physical Assistance Level 25% or less   CARE Score 3   Walk 10 Feet Discharge Goal   Discharge Goal 4   Walk 50 Feet with Two Turns   Assistance Needed Physical assistance   Physical Assistance Level 25% or less   CARE Score 3   Walk 50 Feet with Two Turns Discharge Goal   Discharge Goal 4   Walk 150 Feet   Reason if not Attempted Safety concerns   CARE Score 88   Walk 150 Feet Discharge Goal   Discharge Goal 4   Walking 10 Feet on  Uneven Surfaces   Reason if not Attempted Safety concerns   CARE Score 88   Walking 10 Feet on Uneven Surfaces Discharge Goal   Discharge Goal 4   1 Step (Curb)   Assistance Needed Physical assistance   Physical Assistance Level 25% or less   CARE Score 3   1 Step (Curb) Discharge Goal   Discharge Goal 4   4 Steps   Reason if not Attempted Safety concerns   CARE Score 88   4 Steps Discharge Goal   Discharge Goal 4   12 Steps   Reason if not Attempted Safety concerns   CARE Score 88   12 Steps Discharge Goal   Discharge Goal 4   Picking Up Object   Assistance Needed Physical assistance   Physical Assistance Level 25% or less   CARE Score 3   Picking Up Object Discharge Goal   Discharge Goal 4   Wheel 50 Feet with Two Turns   Assistance Needed Physical assistance   Physical Assistance Level 26%-50%   CARE Score 3   Wheel 50 Feet with Two Turns Discharge Goal   Discharge Goal 4   Wheel 150 Feet   Reason if not Attempted Safety concerns   CARE Score 88   Wheel 150 Feet Discharge Goal   Discharge Goal 4   Gait Functional Level of Assist    Gait Level Of Assist Minimal Assist   Assistive Device None   Distance (Feet)   (25x2; 50x4)   Deviation Decreased Base Of Support;Bradykinetic;Shuffled Gait;Decreased Heel Strike;Decreased Toe Off   Wheelchair Functional Level of Assist   Wheelchair Assist Moderate Assist   Distance Wheelchair (Feet or Distance) 50   Wheelchair Description Assistance with steering;Extra time;Leg rest management;Impaired coordination;Safety concerns;Supervision for safety;Verbal cueing  (Verbal cuing for sequencing and avoiding objects)   Stairs Functional Level of Assist   Level of Assist with Stairs Minimal Assist   # of Stairs Climbed 1   Stairs Description Extra time;Supervision for safety;Verbal cueing;Safety concerns   Transfer Functional Level of Assist   Bed, Chair, Wheelchair Transfer Contact Guard Assist   Bed Chair Wheelchair Transfer Description Increased time;Supervision for safety    Toilet Transfers Contact Guard Assist   Toilet Transfer Description Increased time;Supervision for safety;Verbal cueing   Problem List    Problems Impaired Bed Mobility;Impaired Transfers;Impaired Ambulation;Functional Strength Deficit;Impaired Balance;Decreased Activity Tolerance;Safety Awareness Deficits / Cognition;Motor Planning / Sequencing   Precautions   Precautions Fall Risk;Swallow Precautions ( See Comments)   Comments thickened liquids; expressive aphasia    Current Discharge Plan   Current Discharge Plan Unknown at this Time   Interdisciplinary Plan of Care Collaboration   IDT Collaboration with  Physician;Nursing;Certified Nursing Assistant   Patient Position at End of Therapy In Bed;Call Light within Reach;Tray Table within Reach;Phone within Reach   Collaboration Comments Notified nursing and CNA of low BP. Physician checked in on pt during tx.   Benefit   Therapy Benefit Patient Would Benefit from Inpatient Rehabilitation Physical Therapy to Maximize Functional [place] with ADLs, IADLs and Mobility.   Strengths & Barriers   Strengths Pleasant and cooperative;Willingly participates in therapeutic activities   Barriers Aphasia expressive;Decreased endurance;Difficulty following instructions;Fatigue;Hypotension;Impaired activity tolerance;Impaired balance;Impulsive   PT Total Time Spent   PT Individual Total Time Spent (Mins) 60   PT Charge Group   PT Gait Training 1   PT Evaluation PT Evaluation Mod     Provided pt education on purpose of PT and oriented to WC, call button, and rehab.     Assessment  Patient is 60 y.o. female with a diagnosis of left MCA M1 thrombus s/p TPA and IR mechanical thrombectomy on 2/20. CT with incidental multiple bilateral thyroid nodules and pleural effusions. Hospital course with cardiomyopathy with EF 25%. Of note, patient was seen in the ED on 2/11 with complaints of nausea/emesis/diarrhea, refused COVID test and left AMA. CT abdomen at that time showed portal  hypertension, ascites, and extensive solid mass on the lower pole of the right kidney, unchanged in size as compared to previous scans including renal MRIs done in 2014 and 2017. Additional factors influencing patient status / progress include medical history of anxiety, depression, Crohn’s disease, meth use and tobacco use. She also presents with decreased ability to transfer, perform gait, perform bed and WC mobility independently. Her aphasia allows her to answer with mostly yes/no responses and she can follow commands intermittently.     Plan  Recommend Physical Therapy  minutes per day 5-6 days per week for 14 days for the following treatments:  PT Group Therapy, PT Gait Training, PT Self Care/Home Eval, PT Therapeutic Exercises, PT Neuro Re-Ed/Balance, PT Aquatic Therapy, PT Therapeutic Activity and PT Manual Therapy.    Goals:  Long term and short term goals have been discussed with patient and they are in agreement.    Physical Therapy Problems     Problem: Mobility     Dates: Start: 02/26/21       Goal: STG-Within one week, patient will propel wheelchair household distances     Dates: Start: 02/26/21       Description: 1) Individualized goal: of 150' on flat ground [place] and minimal verbal cues for steering.   2) Interventions:  PT Group Therapy, PT Gait Training, PT Self Care/Home Eval, PT Therapeutic Exercises, PT Neuro Re-Ed/Balance, PT Aquatic Therapy, PT Therapeutic Activity, and PT Manual Therapy            Goal: STG-Within one week, patient will ambulate household distance     Dates: Start: 02/26/21       Description: 1) Individualized goal: of 150' CGA, no AD on flat ground to improve ambulation in the home.  2) Interventions:  PT Group Therapy, PT Gait Training, PT Self Care/Home Eval, PT Therapeutic Exercises, PT Neuro Re-Ed/Balance, PT Aquatic Therapy, PT Therapeutic Activity, and PT Manual Therapy          Goal: STG-Within one week, patient will ascend and descend four to six stairs      Dates: Start: 02/26/21       Description: 1) Individualized goal: CGA with 1HR, no AD.  2) Interventions:  PT Group Therapy, PT Gait Training, PT Self Care/Home Eval, PT Therapeutic Exercises, PT Neuro Re-Ed/Balance, PT Aquatic Therapy, PT Therapeutic Activity, and PT Manual Therapy                Problem: Mobility Transfers     Dates: Start: 02/26/21       Goal: STG-Within one week, patient will perform bed mobility     Dates: Start: 02/26/21       Description: 1) Individualized goal: independently to improve independence in home.   2) Interventions:  PT Group Therapy, PT Gait Training, PT Self Care/Home Eval, PT Therapeutic Exercises, PT Neuro Re-Ed/Balance, PT Aquatic Therapy, PT Therapeutic Activity, and PT Manual Therapy                Problem: PT-Long Term Goals     Dates: Start: 02/26/21       Goal: LTG-By discharge, patient will propel wheelchair     Dates: Start: 02/26/21       Description: 1) Individualized goal: of 150' on flat ground supervised and minimal verbal cues for steering.   2) Interventions:  PT Group Therapy, PT Gait Training, PT Self Care/Home Eval, PT Therapeutic Exercises, PT Neuro Re-Ed/Balance, PT Aquatic Therapy, PT Therapeutic Activity, and PT Manual Therapy          Goal: LTG-By discharge, patient will ambulate     Dates: Start: 02/26/21       Description: 1) Individualized goal: of 500' CGA on flat ground to improve ambulation in the community.   2) Interventions:  PT Group Therapy, PT Gait Training, PT Self Care/Home Eval, PT Therapeutic Exercises, PT Neuro Re-Ed/Balance, PT Aquatic Therapy, PT Therapeutic Activity, and PT Manual Therapy          Goal: LTG-By discharge, patient will transfer one surface to another     Dates: Start: 02/26/21       Description: 1) Individualized goal: Supervised to decrease caregiver burden.   2) Interventions:  PT Group Therapy, PT Gait Training, PT Self Care/Home Eval, PT Therapeutic Exercises, PT Neuro Re-Ed/Balance, PT Aquatic Therapy, PT  Therapeutic Activity, and PT Manual Therapy          Goal: LTG-By discharge, patient will ambulate up/down flight of stairs     Dates: Start: 02/26/21       Description: 1) Individualized goal: CGA using 1HR to improve pt's mobility in the community.   2) Interventions:  PT Group Therapy, PT Gait Training, PT Self Care/Home Eval, PT Therapeutic Exercises, PT Neuro Re-Ed/Balance, PT Aquatic Therapy, PT Therapeutic Activity, and PT Manual Therapy          Goal: LTG-By discharge, patient will transfer in/out of a car     Dates: Start: 02/26/21       Description: 1) Individualized goal: Supervised with Min verbal cuing to decrease caregiver burden.  2) Interventions:  PT Group Therapy, PT Gait Training, PT Self Care/Home Eval, PT Therapeutic Exercises, PT Neuro Re-Ed/Balance, PT Aquatic Therapy, PT Therapeutic Activity, and PT Manual Therapy

## 2021-02-26 NOTE — FLOWSHEET NOTE
02/25/21 1905   Patient History   Pulmonary Diagnosis None per Med rec Hx   Procedures Relevant to Respiratory Status None   Home O2 No   Nocturnal CPAP No   Home Treatments/Frequency No   Protocol Pathways   Protocol Pathways None

## 2021-02-26 NOTE — ASSESSMENT & PLAN NOTE
Na: 136 --> 140 --> 131 (2/26)  ? 2nd to Lisinopril (new med) --> being d/c'd 2nd to low BP (3/1)  Monitor for now

## 2021-02-27 PROCEDURE — 700102 HCHG RX REV CODE 250 W/ 637 OVERRIDE(OP): Performed by: PHYSICAL MEDICINE & REHABILITATION

## 2021-02-27 PROCEDURE — A9270 NON-COVERED ITEM OR SERVICE: HCPCS | Performed by: PHYSICAL MEDICINE & REHABILITATION

## 2021-02-27 PROCEDURE — 700102 HCHG RX REV CODE 250 W/ 637 OVERRIDE(OP): Performed by: HOSPITALIST

## 2021-02-27 PROCEDURE — 770010 HCHG ROOM/CARE - REHAB SEMI PRIVAT*

## 2021-02-27 PROCEDURE — A9270 NON-COVERED ITEM OR SERVICE: HCPCS | Performed by: HOSPITALIST

## 2021-02-27 PROCEDURE — 97130 THER IVNTJ EA ADDL 15 MIN: CPT

## 2021-02-27 PROCEDURE — 94760 N-INVAS EAR/PLS OXIMETRY 1: CPT

## 2021-02-27 PROCEDURE — 97129 THER IVNTJ 1ST 15 MIN: CPT

## 2021-02-27 PROCEDURE — 99232 SBSQ HOSP IP/OBS MODERATE 35: CPT | Performed by: HOSPITALIST

## 2021-02-27 RX ADMIN — FUROSEMIDE 10 MG: 20 TABLET ORAL at 05:31

## 2021-02-27 RX ADMIN — ATORVASTATIN CALCIUM 80 MG: 40 TABLET, FILM COATED ORAL at 20:42

## 2021-02-27 RX ADMIN — SENNOSIDES AND DOCUSATE SODIUM 2 TABLET: 8.6; 5 TABLET ORAL at 20:42

## 2021-02-27 RX ADMIN — APIXABAN 5 MG: 5 TABLET, FILM COATED ORAL at 20:42

## 2021-02-27 RX ADMIN — SENNOSIDES AND DOCUSATE SODIUM 2 TABLET: 8.6; 5 TABLET ORAL at 09:27

## 2021-02-27 RX ADMIN — APIXABAN 5 MG: 5 TABLET, FILM COATED ORAL at 09:27

## 2021-02-27 RX ADMIN — LISINOPRIL 2.5 MG: 5 TABLET ORAL at 05:32

## 2021-02-27 RX ADMIN — POTASSIUM CHLORIDE 10 MEQ: 1500 TABLET, EXTENDED RELEASE ORAL at 09:26

## 2021-02-27 RX ADMIN — CARVEDILOL 3.12 MG: 3.12 TABLET, FILM COATED ORAL at 18:09

## 2021-02-27 RX ADMIN — CARVEDILOL 3.12 MG: 3.12 TABLET, FILM COATED ORAL at 09:27

## 2021-02-27 ASSESSMENT — ENCOUNTER SYMPTOMS
SHORTNESS OF BREATH: 0
HALLUCINATIONS: 0
NAUSEA: 0
VOMITING: 0
BLURRED VISION: 0
HEADACHES: 0
FEVER: 0
PALPITATIONS: 0
DIZZINESS: 0

## 2021-02-27 NOTE — CARE PLAN
Problem: Safety  Goal: Will remain free from injury  Outcome: PROGRESSING AS EXPECTED  Goal: Will remain free from falls  Outcome: PROGRESSING AS EXPECTED   Pt education given regarding fall precautions AND safety measures, pt shows good understanding, has not attempted to self transfer this shift, will continue to reinforce education and continue to monitor.

## 2021-02-27 NOTE — PROGRESS NOTES
Received patient during shift change, report rec'd from day shift RN. Resting in bed, VS stable w/O2 via nc. Per report continent of B&B, contact guard assist  for transfers. A&O x 3, aphasic, able to make needs known. Bed in low position, call light within reach.

## 2021-02-27 NOTE — PROGRESS NOTES
[facility] Medicine Daily Progress Note    Date of Service  2/27/2021    Chief Complaint:  Hypotension  CHF/Cardiomyopathy  Elevated BNP    Interval History:  No significant events or changes since last visit    Review of Systems  Review of Systems   Constitutional: Negative for fever.   Eyes: Negative for blurred vision.   Respiratory: Negative for shortness of breath.    Cardiovascular: Negative for palpitations.   Gastrointestinal: Negative for nausea and vomiting.   Neurological: Negative for dizziness and headaches.   Psychiatric/Behavioral: Negative for hallucinations.        Physical Exam  Temp:  [36.5 °C (97.7 °F)-36.8 °C (98.2 °F)] 36.5 °C (97.7 °F)  Pulse:  [80-94] 87  Resp:  [16-18] 17  BP: ()/(58-70) 102/65  SpO2:  [95 %-100 %] 100 %    Physical Exam  Vitals and nursing note reviewed.   Constitutional:       General: She is not in acute distress.  HENT:      Mouth/Throat:      Mouth: Mucous membranes are moist.      Pharynx: Oropharynx is clear.   Eyes:      General: No scleral icterus.  Neck:      Vascular: No JVD.   Cardiovascular:      Rate and Rhythm: Normal rate and regular rhythm.      Heart sounds: Normal heart sounds.   Pulmonary:      Effort: Pulmonary effort is normal.      Breath sounds: No wheezing or rales.      Comments: Breath sounds are diminished  Abdominal:      General: There is no distension.      Palpations: Abdomen is soft.      Tenderness: There is no abdominal tenderness.   Musculoskeletal:      Cervical back: No rigidity.      Right lower leg: No edema.      Left lower leg: No edema.   Skin:     General: Skin is warm and dry.   Neurological:      Mental Status: She is alert and oriented to person, place, and time.   Psychiatric:         Mood and Affect: Mood normal.         Behavior: Behavior normal.         Fluids    Intake/Output Summary (Last 24 hours) at 2/27/2021 1002  Last data filed at 2/26/2021 2120  Gross per 24 hour   Intake 560 ml   Output --   Net 560 ml        Laboratory  Recent Labs     02/26/21  0624   WBC 6.0   RBC 4.17*   HEMOGLOBIN 12.3   HEMATOCRIT 39.9   MCV 95.7   MCH 29.5   MCHC 30.8*   RDW 48.4   PLATELETCT 355   MPV 10.6     Recent Labs     02/26/21  0624   SODIUM 131*   POTASSIUM 4.2   CHLORIDE 98   CO2 27   GLUCOSE 143*   BUN 16   CREATININE 0.64   CALCIUM 9.3                   Imaging    Assessment/Plan  * CVA (cerebral vascular accident) (HCC)- (present on admission)  Assessment & Plan  Had a moderate L-MCA stroke  S/P TPA  S/P thrombectomy  On Lipitor  On Eliquis    Hepatocellular dysfunction- (present on admission)  Assessment & Plan  CT Abd (2/11): heterogeneous appearance of the liver with decreased attenuation and periportal edema;                            this likely represents presence of hepatocellular dysfunction                           portal vein is also mildly dilated and there is a small amount of ascites as well as diffuse                            mesenteric edema suggesting a component of portal hypertension.  Needs outpatient follow up and monitoring    Hyponatremia- (present on admission)  Assessment & Plan  Na: 136 --> 140 --> 131 (2/26)  ? 2nd to Lisinopril (new med)  Monitor for now    Hypotension- (present on admission)  Assessment & Plan  (2/26): BP drops lower on occ and hit 87/58  (2/27): BP better recently  On Coreg: 3.125 mg bid  On Lisinopril: 5 mg daily --> 2.5 mg daily 2nd to hypotension (2/27)  On Lasix: 20 mg daily --> 10 mg daily 2nd to hypotension (2/27)  Note: on thickened liquids  Cont to monitor    Acute systolic CHF (congestive heart failure) (HCC)- (present on admission)  Assessment & Plan  Echo (2/20): EF 25%, mod MS  BNP: 824 (2/26) -- no other Epic values to compare  On Coreg   On Lisinopril  On Lasix: 20 mg daily --> 10 mg daily 2nd to hypotension (2/27)  On KCL: 20 meq daily  Monitor K+ levels: 4.2 (2/26)  Cardio suspects cardiomyopathy 2nd to Meth use  Note: Cardio suggests repeat echo in 2-3 months  and f/u with them in about 1 month    Other hyperlipidemia- (present on admission)  Assessment & Plan  On Lipitor    Methamphetamine abuse (HCC)- (present on admission)  Assessment & Plan  Has hx  Has been counseled several times about cessation at [facility] [place]    Crohn disease (HCC)- (present on admission)  Assessment & Plan  Has hx    Vitamin D deficiency- (present on admission)  Assessment & Plan  VIt D: 16  On supplements

## 2021-02-27 NOTE — THERAPY
Speech Language Pathology  Daily Treatment     Patient Name: Imani Stephen  Age:  60 y.o., Sex:  female  Medical Record #: [id number]  Today's Date: 2/27/2021     Precautions  Precautions: Fall Risk, Swallow Precautions ( See Comments)  Comments: thickened liquids; expressive aphasia     Subjective    Pt fatigued, however, cooperative during ST session.     Objective       02/27/21 0931   Receptive Language / Auditory Comprehension   Follows One Unit Commands Moderate (3)   Follows Two Unit Commands Severe (2)   Understands Simple, Structured Conversation  Moderate (3)   Cognition   Attention to Task Severe (2)   Orientation  Moderate (3)   Functional Problem Solving Severe (2)   ABS (Agitated Behavior Scale)   Agitated Behavior Scale Performed Yes   Short Attention Span, Easy Distractibility, Inability to Concentrate 2   Impulsive, Impatient, Low Tolerance for Pain or Frustration 1   Uncooperative, Resistant to Care, Demanding 1   Violent and/or Threatening Violence Toward People or Property 1   Explosive and/or Unpredictable Anger 1   Rocking, Rubbing, Moaning, Other Self-Stimulating Behavior 1   Pulling at Tubes, Restraints, etc. 1   Wandering from Treatment Area 1   Restlessness, Pacing, Excessive Movement 1   Repetitive Behaviors, Motor and/or Verbal 1   Rapid, Loud or Excessive Talking 1   Sudden Changes of Mood 1   Easily Initiated - Excessive Crying and/or Laughter 1   Self-Abusiveness, Physical and/or Verbal 1   Agitated Behavior Scale Total Score 15   Level of Severity No Agitation   Sleep/Wake Cycle   Sleep & Rest Asleep   Sleep Observations Alert upon awakening   Interdisciplinary Plan of Care Collaboration   IDT Collaboration with  Nursing   Patient Position at End of Therapy In Bed;Call Light within Reach   Collaboration Comments clof   SLP Total Time Spent   SLP Individual Total Time Spent (Mins) 60   Treatment Charges   Charges Yes   SLP Cognitive Skill Development First 15 Minutes 1   SLP Cognitive  Skill Development Additional 15 Minutes 3       Assessment    Two step directions 25% acc w/ visual models and verbal repetitions.   Memory book initiated - pt used information board w/ cue to figure out date, pt could not recall breakfast without choices, total assistance to fill out ST information.  Functional phrases targeted-  I'm in pain.  I need to go to the bathroom.  I'm hungry.  I'm thirsty.    Plan    Continue to address functional memory, consider use of O-log to increase orientation.     Speech Therapy Problems     Problem: Comprehension STGs     Dates: Start: 02/26/21       Goal: STG-Within one week, patient will     Dates: Start: 02/26/21       Description: 1) Individualized goal:  follow simple 2-step verbal directions with 90% accuracy and minimal cues  2) Interventions:  SLP Speech Language Treatment, SLP Self Care / ADL Training , SLP Cognitive Skill Development, and SLP Group Treatment                    Problem: Expression STGs     Dates: Start: 02/26/21       Goal: STG-Within one week, patient will     Dates: Start: 02/26/21       Description: 1) Individualized goal:  use functional phrases to effectively communicate wants/needs  2) Interventions:  SLP Speech Language Treatment, SLP Self Care / ADL Training , SLP Cognitive Skill Development, and SLP Group Treatment                    Problem: Memory STGs     Dates: Start: 02/26/21       Goal: STG-Within one week, patient will     Dates: Start: 02/26/21       Description: 1) Individualized goal:  use external memory aids (calendar, memory book, white board, etc.) to remain OX4 for 90% of opportunities and minimal cues  2) Interventions:  SLP Speech Language Treatment, SLP Self Care / ADL Training , SLP Cognitive Skill Development, and SLP Group Treatment                    Problem: Speech/Swallowing LTGs     Dates: Start: 02/26/21       Goal: LTG-By discharge, patient will safely swallow     Dates: Start: 02/26/21       Description: 1)  Individualized goal:  least restrictive diet without overt s/sx of asp for 100% of PO intake  2) Interventions:  SLP Swallowing Dysfunction Treatment, SLP Oral Pharyngeal Evaluation, SLP Self Care / ADL Training , and SLP Group Treatment              Goal: LTG-By discharge, patient will     Dates: Start: 02/26/21       Description: 1) Individualized goal: improve cognitive linguistic skills from the severe range to the moderate range as indicated by formal assessment and functional abilities  2) Interventions:  SLP Speech Language Treatment, SLP Self Care / ADL Training , SLP Cognitive Skill Development, and SLP Group Treatment                    Problem: Swallowing STGs     Dates: Start: 02/26/21       Goal: STG-Within one week, patient will     Dates: Start: 02/26/21       Description: 1) Individualized goal:  tolerate minced/moist textures with mildly thick liquids without overt s/sx of sap for 100% of PO intake   2) Interventions:  SLP Swallowing Dysfunction Treatment, SLP Oral Pharyngeal Evaluation, SLP Self Care / ADL Training , and SLP Group Treatment

## 2021-02-27 NOTE — FLOWSHEET NOTE
02/26/21 1923   Events/Summary/Plan   Events/Summary/Plan SpO2 check   Vital Signs   Pulse 88   Respiration 16   Pulse Oximetry 96 %   $ Pulse Oximetry (Spot Check) Yes   Oxygen   O2 (LPM) 2   O2 Delivery Device Nasal Cannula

## 2021-02-27 NOTE — PROGRESS NOTES
"Rehab Progress Note     Date of Service: 2/26/2021  Chief Complaint: follow up stroke    Interval Events (Subjective)    Patient seen and examined today in her room. She denies any pain. She reports she slept well last night. Unclear how much she understands due to her aphasia. Hospitalist consulted for her medical co-morbidities.    Objective:  VITAL SIGNS: BP (!) 94/60   Pulse 88   Temp 36.8 °C (98.2 °F) (Oral)   Resp 16   Ht 1.575 m (5' 2\")   Wt 47.6 kg (105 lb)   SpO2 97%   BMI 19.20 kg/m²   Gen: alert, no apparent distress  Neuro: notable for expressive aphasia  Psych: flat affect    Recent Results (from the past 72 hour(s))   SARS-CoV-2 PCR (24 hour In-House): Collect NP swab in [facility]    Collection Time: 02/25/21  8:05 PM    Specimen: Nasopharyngeal; Respirate   Result Value Ref Range    SARS-CoV-2 Source NP Swab     SARS-CoV-2 by PCR NotDetected    proBrain Natriuretic Peptide, NT    Collection Time: 02/26/21  6:24 AM   Result Value Ref Range    NT-proBNP 824 (H) 0 - 125 pg/mL   CBC with Differential    Collection Time: 02/26/21  6:24 AM   Result Value Ref Range    WBC 6.0 4.8 - 10.8 K/uL    RBC 4.17 (L) 4.20 - 5.40 M/uL    Hemoglobin 12.3 12.0 - 16.0 g/dL    Hematocrit 39.9 37.0 - 47.0 %    MCV 95.7 81.4 - 97.8 fL    MCH 29.5 27.0 - 33.0 pg    MCHC 30.8 (L) 33.6 - 35.0 g/dL    RDW 48.4 35.9 - 50.0 fL    Platelet Count 355 164 - 446 K/uL    MPV 10.6 9.0 - 12.9 fL    Neutrophils-Polys 50.30 44.00 - 72.00 %    Lymphocytes 35.70 22.00 - 41.00 %    Monocytes 9.00 0.00 - 13.40 %    Eosinophils 3.90 0.00 - 6.90 %    Basophils 0.80 0.00 - 1.80 %    Immature Granulocytes 0.30 0.00 - 0.90 %    Nucleated RBC 0.00 /100 WBC    Neutrophils (Absolute) 3.00 2.00 - 7.15 K/uL    Lymphs (Absolute) 2.13 1.00 - 4.80 K/uL    Monos (Absolute) 0.54 0.00 - 0.85 K/uL    Eos (Absolute) 0.23 0.00 - 0.51 K/uL    Baso (Absolute) 0.05 0.00 - 0.12 K/uL    Immature Granulocytes (abs) 0.02 0.00 - 0.11 K/uL    NRBC (Absolute) 0.00 K/uL "   Comp Metabolic Panel (CMP)    Collection Time: 02/26/21  6:24 AM   Result Value Ref Range    Sodium 131 (L) 135 - 145 mmol/L    Potassium 4.2 3.6 - 5.5 mmol/L    Chloride 98 96 - 112 mmol/L    Co2 27 20 - 33 mmol/L    Anion Gap 6.0 (L) 7.0 - 16.0    Glucose 143 (H) 65 - 99 mg/dL    Bun 16 8 - 22 mg/dL    Creatinine 0.64 0.50 - 1.40 mg/dL    Calcium 9.3 8.5 - 10.5 mg/dL    AST(SGOT) 12 12 - 45 U/L    ALT(SGPT) 17 2 - 50 U/L    Alkaline Phosphatase 74 30 - 99 U/L    Total Bilirubin 0.4 0.1 - 1.5 mg/dL    Albumin 3.2 3.2 - 4.9 g/dL    Total Protein 6.3 6.0 - 8.2 g/dL    Globulin 3.1 1.9 - 3.5 g/dL    A-G Ratio 1.0 g/dL   Magnesium    Collection Time: 02/26/21  6:24 AM   Result Value Ref Range    Magnesium 1.8 1.5 - 2.5 mg/dL   Vitamin D, 25-hydroxy (blood)    Collection Time: 02/26/21  6:24 AM   Result Value Ref Range    25-Hydroxy   Vitamin D 25 16 (L) 30 - 100 ng/mL   ESTIMATED GFR    Collection Time: 02/26/21  6:24 AM   Result Value Ref Range    GFR If African American >60 >60 mL/min/1.73 m 2    GFR If Non African American >60 >60 mL/min/1.73 m 2       Current Facility-Administered Medications   Medication Frequency   • [START ON 2/27/2021] furosemide (LASIX) tablet 10 mg Q DAY   • [START ON 2/27/2021] potassium chloride SA (Kdur) tablet 10 mEq DAILY   • [START ON 2/27/2021] lisinopril (PRINIVIL) tablet 2.5 mg Q DAY   • hydrOXYzine HCl (ATARAX) tablet 50 mg Q6HRS PRN   • melatonin tablet 3 mg HS PRN   • Respiratory Therapy Consult Continuous RT   • Pharmacy Consult Request ...Pain Management Review 1 Each PHARMACY TO DOSE   • hydrALAZINE (APRESOLINE) tablet 10 mg Q8HRS PRN   • acetaminophen (Tylenol) tablet 650 mg Q4HRS PRN   • lactulose 20 GM/30ML solution 30 mL QDAY PRN   • docusate sodium (ENEMEEZ) enema 283 mg QDAY PRN   • fleet enema 133 mL QDAY PRN   • artificial tears ophthalmic solution 1 Drop PRN   • benzocaine-menthol (CEPACOL) lozenge 1 Lozenge Q2HRS PRN   • mag hydrox-al hydrox-simeth (MAALOX PLUS ES  or MYLANTA DS) suspension 20 mL Q2HRS PRN   • ondansetron (ZOFRAN ODT) dispertab 4 mg 4X/DAY PRN    Or   • ondansetron (ZOFRAN) syringe/vial injection 4 mg 4X/DAY PRN   • traZODone (DESYREL) tablet 50 mg QHS PRN   • sodium chloride (OCEAN) 0.65 % nasal spray 2 Spray PRN   • midazolam (VERSED) 5 mg/mL (1 mL vial) PRN   • senna-docusate (PERICOLACE or SENOKOT S) 8.6-50 MG per tablet 2 tablet BID    And   • polyethylene glycol/lytes (MIRALAX) PACKET 1 Packet QDAY PRN    And   • magnesium hydroxide (MILK OF MAGNESIA) suspension 30 mL QDAY PRN    And   • bisacodyl (DULCOLAX) suppository 10 mg QDAY PRN   • atorvastatin (LIPITOR) tablet 80 mg Q EVENING   • apixaban (ELIQUIS) tablet 5 mg BID   • carvedilol (COREG) tablet 3.125 mg BID WITH MEALS   • [START ON 3/1/2021] vitamin D (Ergocalciferol) (DRISDOL) capsule 50,000 Units Q7 DAYS       Orders Placed This Encounter   Procedures   • Diet Order Diet: Level 5 - Minced and Moist (meds - 1:1 with MTL); Liquid level: Level 2 - Mildly Thick; Tray Modifications (optional): No Straws, SLP - 1:1 Supervision by Nursing, SLP - Deliver to Nursing Station     Standing Status:   Standing     Number of Occurrences:   1     Order Specific Question:   Diet:     Answer:   Level 5 - Minced and Moist [24]     Comments:   meds - 1:1 with MTL     Order Specific Question:   Liquid level     Answer:   Level 2 - Mildly Thick     Order Specific Question:   Tray Modifications (optional)     Answer:   No Straws     Order Specific Question:   Tray Modifications (optional)     Answer:   SLP - 1:1 Supervision by Nursing     Order Specific Question:   Tray Modifications (optional)     Answer:   SLP - Deliver to Nursing Station       Assessment:  Active Hospital Problems    Diagnosis    • *CVA (cerebral vascular accident) (HCC)    • Crohn disease (HCC)    • Vitamin D deficiency    • Hypotension    • Hyponatremia    • Hepatocellular dysfunction    • Other hyperlipidemia    • Acute systolic CHF (congestive  heart failure) (HCC)    • Methamphetamine abuse (HCC)    • Tobacco dependence      This patient is a 60 y.o. female admitted for acute inpatient rehabilitation with CVA (cerebral vascular accident) (HCC).    Medical Decision Making and Plan:    Left MCA M1 occlusion  S/p tPA  S/p thrombectomy  Dyphagia  Aphasia  Continue full rehab program  PT/OT/SLP, 1 hr each discipline, 5 days per week  Continue apixaban and atorvastatin for secondary stroke prophylaxis    Anxiety/depression  Unclear if she's been on medication in the past  Monitor need for medication intervention  Consult psychology if needed     Meth use  Patient denies, but +UDS  Counseling     Tobacco use  Will need to provide counseling to patient and SO     Crohn's disease  Unclear if she has pain/diarrhea/constipation  Did have recent ED visit for GI complaints, left AMA     Pleural effusions  Continue Lasix  Chest x-ray today with small left pleural effusion     Portal hypertension  Ascites  Likely from cirrhosis/hepatocellular dysfunction based upon scan  Monitor for fluid overload     Thyroid nodules  Normal TSH  Outpatient monitoring     Renal mass  Stable  Outpatient monitoring     Acute systolic CHF, EF 25%  Cardiomyopathy  Suspect from drug use  Lasix  Coreg  Hospitalist consult, appreciate assistance  Lisinopril     Mitral stenosis  Monitor     Hyperlipidemia  Statin     Normocytic anemia  Mild, monitor    Hyponatremia  Decrease Lasix  Monitor    Vitamin D deficiency  Continue supplementation    Bowel program  Continue bowel medications  Scheduled Sennakot  PRN Miralax, MOM, bisacodyl suppository  Last BM 2/26    Bladder program  Check PVRs - 38, 26, 17  Bladder scan for no voids  ICP for over 400 cc  Scheduled toileting    DVT prophylaxis  Eliquis    Total time:  35 minutes.  I spent greater than 50% of the time for patient care, counseling, and coordination on this date, including patient face-to face time, unit/floor time with review of  records/pertinent lab data and studies, as well as discussing diagnostic evaluation/work up, planned therapeutic interventions, and future disposition of care, as per the interval events/subjective and the assessment and plan as noted above.      Maryana Flanagan M.D.   Physical Medicine and Rehabilitation

## 2021-02-27 NOTE — CARE PLAN
Problem: Communication  Goal: The ability to communicate needs accurately and effectively will improve  Outcome: PROGRESSING AS EXPECTED  Intervention: Collaborate with speech therapy  Note: Little verbal response at hs, cooperative w/care.      Problem: Safety  Goal: Will remain free from injury  Outcome: PROGRESSING AS EXPECTED     Problem: Respiratory:  Goal: Respiratory status will improve  Outcome: PROGRESSING AS EXPECTED  Intervention: Assess and monitor pulmonary status  Flowsheets (Taken 2/26/2021 1923 by Royer Larios, RRT)  O2 (LPM): 2  Note: O2 in place via nc. Resp even, unlabored.

## 2021-02-28 PROCEDURE — 700102 HCHG RX REV CODE 250 W/ 637 OVERRIDE(OP): Performed by: PHYSICAL MEDICINE & REHABILITATION

## 2021-02-28 PROCEDURE — 97116 GAIT TRAINING THERAPY: CPT

## 2021-02-28 PROCEDURE — A9270 NON-COVERED ITEM OR SERVICE: HCPCS | Performed by: HOSPITALIST

## 2021-02-28 PROCEDURE — 770010 HCHG ROOM/CARE - REHAB SEMI PRIVAT*

## 2021-02-28 PROCEDURE — 99232 SBSQ HOSP IP/OBS MODERATE 35: CPT | Performed by: HOSPITALIST

## 2021-02-28 PROCEDURE — 700102 HCHG RX REV CODE 250 W/ 637 OVERRIDE(OP): Performed by: HOSPITALIST

## 2021-02-28 PROCEDURE — 97110 THERAPEUTIC EXERCISES: CPT

## 2021-02-28 PROCEDURE — 97130 THER IVNTJ EA ADDL 15 MIN: CPT

## 2021-02-28 PROCEDURE — A9270 NON-COVERED ITEM OR SERVICE: HCPCS | Performed by: PHYSICAL MEDICINE & REHABILITATION

## 2021-02-28 PROCEDURE — 97129 THER IVNTJ 1ST 15 MIN: CPT

## 2021-02-28 RX ADMIN — APIXABAN 5 MG: 5 TABLET, FILM COATED ORAL at 20:12

## 2021-02-28 RX ADMIN — APIXABAN 5 MG: 5 TABLET, FILM COATED ORAL at 09:26

## 2021-02-28 RX ADMIN — CARVEDILOL 3.12 MG: 3.12 TABLET, FILM COATED ORAL at 09:26

## 2021-02-28 RX ADMIN — ATORVASTATIN CALCIUM 80 MG: 40 TABLET, FILM COATED ORAL at 20:11

## 2021-02-28 RX ADMIN — LISINOPRIL 2.5 MG: 5 TABLET ORAL at 06:05

## 2021-02-28 RX ADMIN — POTASSIUM CHLORIDE 10 MEQ: 1500 TABLET, EXTENDED RELEASE ORAL at 09:26

## 2021-02-28 RX ADMIN — FUROSEMIDE 10 MG: 20 TABLET ORAL at 06:06

## 2021-02-28 RX ADMIN — CARVEDILOL 3.12 MG: 3.12 TABLET, FILM COATED ORAL at 17:58

## 2021-02-28 RX ADMIN — SENNOSIDES AND DOCUSATE SODIUM 2 TABLET: 8.6; 5 TABLET ORAL at 09:26

## 2021-02-28 ASSESSMENT — GAIT ASSESSMENTS
DEVIATION: BRADYKINETIC
GAIT LEVEL OF ASSIST: STAND BY ASSIST

## 2021-02-28 ASSESSMENT — ENCOUNTER SYMPTOMS
COUGH: 0
DIARRHEA: 0
BLURRED VISION: 0
DIZZINESS: 0
FEVER: 0
NERVOUS/ANXIOUS: 0

## 2021-02-28 ASSESSMENT — FIBROSIS 4 INDEX: FIB4 SCORE: 0.51

## 2021-02-28 NOTE — FLOWSHEET NOTE
02/27/21 1654   Events/Summary/Plan   Events/Summary/Plan SpO2 check on room air, DC oxygen per protocol   Vital Signs   Pulse 88   Respiration 16   Pulse Oximetry 95 %   $ Pulse Oximetry (Spot Check) Yes   Respiratory Assessment   Level of Consciousness Alert   Oxygen   O2 Delivery Device None - Room Air

## 2021-02-28 NOTE — PROGRESS NOTES
Received patient during shift change, report rec'd from day shift RN. Resting in bed, VS stable on room air. Continent of B&B, contact guard assist for transfers. A&O x 3, able to make needs known. Bed in low position, call light within reach.

## 2021-02-28 NOTE — PROGRESS NOTES
[facility] Medicine Daily Progress Note    Date of Service  2/28/2021    Chief Complaint:  Hypotension  CHF/Cardiomyopathy  Elevated BNP    Interval History:  No significant events or changes since last visit    Review of Systems  Review of Systems   Constitutional: Negative for fever.   Eyes: Negative for blurred vision.   Respiratory: Negative for cough.    Cardiovascular: Negative for chest pain.   Gastrointestinal: Negative for diarrhea.   Musculoskeletal: Negative for joint pain.   Neurological: Negative for dizziness.   Psychiatric/Behavioral: The patient is not nervous/anxious.         Physical Exam  Temp:  [36.2 °C (97.2 °F)-36.7 °C (98 °F)] 36.7 °C (98 °F)  Pulse:  [83-90] 85  Resp:  [16-18] 18  BP: ()/(53-79) 113/58  SpO2:  [94 %-98 %] 98 %    Physical Exam  Vitals and nursing note reviewed.   Constitutional:       Appearance: She is not diaphoretic.   HENT:      Mouth/Throat:      Pharynx: No oropharyngeal exudate or posterior oropharyngeal erythema.   Eyes:      Extraocular Movements: Extraocular movements intact.   Neck:      Vascular: No carotid bruit or JVD.   Cardiovascular:      Rate and Rhythm: Normal rate and regular rhythm.      Heart sounds: Normal heart sounds.   Pulmonary:      Effort: Pulmonary effort is normal.      Breath sounds: No wheezing or rales.      Comments: Breath sounds are diminished  Abdominal:      General: There is no distension.      Palpations: Abdomen is soft.      Tenderness: There is no abdominal tenderness.   Musculoskeletal:      Right lower leg: No edema.      Left lower leg: No edema.   Skin:     General: Skin is warm and dry.   Neurological:      Mental Status: She is alert and oriented to person, place, and time.   Psychiatric:         Mood and Affect: Mood normal.         Behavior: Behavior normal.         Fluids    Intake/Output Summary (Last 24 hours) at 2/28/2021 1056  Last data filed at 2/28/2021 0900  Gross per 24 hour   Intake 120 ml   Output --   Net 120  ml       Laboratory  Recent Labs     02/26/21  0624   WBC 6.0   RBC 4.17*   HEMOGLOBIN 12.3   HEMATOCRIT 39.9   MCV 95.7   MCH 29.5   MCHC 30.8*   RDW 48.4   PLATELETCT 355   MPV 10.6     Recent Labs     02/26/21  0624   SODIUM 131*   POTASSIUM 4.2   CHLORIDE 98   CO2 27   GLUCOSE 143*   BUN 16   CREATININE 0.64   CALCIUM 9.3                   Imaging    Assessment/Plan  * CVA (cerebral vascular accident) (HCC)- (present on admission)  Assessment & Plan  Had a moderate L-MCA stroke  S/P TPA  S/P thrombectomy  On Lipitor  On Eliquis    Hepatocellular dysfunction- (present on admission)  Assessment & Plan  CT Abd (2/11): heterogeneous appearance of the liver with decreased attenuation and periportal edema;                            this likely represents presence of hepatocellular dysfunction                           portal vein is also mildly dilated and there is a small amount of ascites as well as diffuse                            mesenteric edema suggesting a component of portal hypertension.  Needs outpatient follow up and monitoring    Hyponatremia- (present on admission)  Assessment & Plan  Na: 136 --> 140 --> 131 (2/26)  ? 2nd to Lisinopril (new med)  Monitor for now    Hypotension- (present on admission)  Assessment & Plan  (2/26): BP drops lower on occ and hit 87/58  (2/27): BP better recently  (2/28): BP ok  On Coreg: 3.125 mg bid  On Lisinopril: 5 mg daily --> 2.5 mg daily 2nd to hypotension (2/27)  On Lasix: 20 mg daily --> 10 mg daily 2nd to hypotension (2/27)  Note: on thickened liquids  Cont to monitor    Acute systolic CHF (congestive heart failure) (HCC)- (present on admission)  Assessment & Plan  Echo (2/20): EF 25%, mod MS  BNP: 824 (2/26) -- no other Epic values to compare  On Coreg   On Lisinopril  On Lasix: 20 mg daily --> 10 mg daily 2nd to hypotension (2/27)  On KCL: 20 meq daily --> decreased to 10 meq daily (2/27)  Monitor K+ levels: 4.2 (2/26)  Cardio suspects cardiomyopathy 2nd to Meth  use  Note: Cardio suggests repeat echo in 2-3 months and f/u with them in about 1 month    Other hyperlipidemia- (present on admission)  Assessment & Plan  On Lipitor    Methamphetamine abuse (HCC)- (present on admission)  Assessment & Plan  Has hx  [place] [facility] [place]    Crohn disease (HCC)- (present on admission)  Assessment & Plan  Has hx    Vitamin D deficiency- (present on admission)  Assessment & Plan  VIt D: 16  On supplements

## 2021-02-28 NOTE — THERAPY
"Physical Therapy   Daily Treatment     Patient Name: Imani Stephen  Age:  60 y.o., Sex:  female  Medical Record #: [id number]  Today's Date: 2/28/2021     Precautions  Precautions: (P) Fall Risk, Swallow Precautions ( See Comments)  Comments: (P) thickened liquids; expressive aphasia     Subjective    Pt received in bed asleep. When awoken she reports that she had a headache but otherwise was feeling ok. Pt agreeable to therapy.      Objective     02/28/21 1031   Precautions   Precautions Fall Risk;Swallow Precautions ( See Comments)   Comments thickened liquids; expressive aphasia    Cognition    Speech/ Communication Expressive Aphasia   Comments pt demonstrates adequate receptive communication. SHe smiles appropriately during therapist conversation and attempts to lighten the mood and make connection.    Gait Functional Level of Assist    Gait Level Of Assist Stand by Assist   Assistive Device None   Distance (Feet)   (5 min intervals in room (500ft) )   # of Times Distance was Traveled 3   Deviation Bradykinetic   Sitting Lower Body Exercises   Sitting Lower Body Exercises Yes   Comments seated on bed figure 4 stretch repeat bilateral 2 x 30 seconds, butterfly stretch 2 x 30\", supine piriformis stretch repeat bilateral 2 x 30 \", single knee to chest stretch 2 x 30\"   Standing Lower Body Exercises   Standing Lower Body Exercises Yes   Hip Extension 3 sets of 10;Bilateral    Hip Abduction 3 sets of 10;Bilateral   Heel Rise 3 sets of 10;Bilateral   Mini Squat Partial;3 sets of 10   Bed Mobility    Supine to Sit Stand by Assist   Sit to Supine Stand by Assist   Sit to Stand Stand by Assist   Scooting Stand by Assist   Rolling Supervised   PT Total Time Spent   PT Individual Total Time Spent (Mins) 60   PT Charge Group   PT Gait Training 2   PT Therapeutic Exercise 2     Plan for session was explained to patient at beginning of session: walking, standing ex, walk, seat ex, walk. Pt was instructed to take breaks as " needed and pace herself throughout the hour. Patient was able to communicate when she needed a rest break. She was able to complete 5 min walk and then take a rest. Following a round of each exercises she would rest.     Assessment    Patient was fatigued by end of session and reported that she was going to sleep. She demonstrated very good improvement in ambulation tolerance today lasting 5 min intervals back and forth in the room. Some of the stretching was difficulty and she needed assistance to complete.     Strengths: Pleasant and cooperative, Willingly participates in therapeutic activities  Barriers: Aphasia expressive, Decreased endurance, Difficulty following instructions, Fatigue, Hypotension, Impaired activity tolerance, Impaired balance, Impulsive    Plan    Progress endurance and activity tolerance.     Physical Therapy Problems     Problem: Mobility     Dates: Start: 02/26/21       Goal: STG-Within one week, patient will propel wheelchair household distances     Dates: Start: 02/26/21       Description: 1) Individualized goal: of 150' on flat ground [place] and minimal verbal cues for steering.   2) Interventions:  PT Group Therapy, PT Gait Training, PT Self Care/Home Eval, PT Therapeutic Exercises, PT Neuro Re-Ed/Balance, PT Aquatic Therapy, PT Therapeutic Activity, and PT Manual Therapy            Goal: STG-Within one week, patient will ambulate household distance     Dates: Start: 02/26/21       Description: 1) Individualized goal: of 150' CGA, no AD on flat ground to improve ambulation in the home.  2) Interventions:  PT Group Therapy, PT Gait Training, PT Self Care/Home Eval, PT Therapeutic Exercises, PT Neuro Re-Ed/Balance, PT Aquatic Therapy, PT Therapeutic Activity, and PT Manual Therapy          Goal: STG-Within one week, patient will ascend and descend four to six stairs     Dates: Start: 02/26/21       Description: 1) Individualized goal: CGA with 1HR, no AD.  2) Interventions:  PT Group  Therapy, PT Gait Training, PT Self Care/Home Eval, PT Therapeutic Exercises, PT Neuro Re-Ed/Balance, PT Aquatic Therapy, PT Therapeutic Activity, and PT Manual Therapy                Problem: Mobility Transfers     Dates: Start: 02/26/21       Goal: STG-Within one week, patient will perform bed mobility     Dates: Start: 02/26/21       Description: 1) Individualized goal: independently to improve independence in home.   2) Interventions:  PT Group Therapy, PT Gait Training, PT Self Care/Home Eval, PT Therapeutic Exercises, PT Neuro Re-Ed/Balance, PT Aquatic Therapy, PT Therapeutic Activity, and PT Manual Therapy                Problem: PT-Long Term Goals     Dates: Start: 02/26/21       Goal: LTG-By discharge, patient will propel wheelchair     Dates: Start: 02/26/21       Description: 1) Individualized goal: of 150' on flat ground supervised and minimal verbal cues for steering.   2) Interventions:  PT Group Therapy, PT Gait Training, PT Self Care/Home Eval, PT Therapeutic Exercises, PT Neuro Re-Ed/Balance, PT Aquatic Therapy, PT Therapeutic Activity, and PT Manual Therapy          Goal: LTG-By discharge, patient will ambulate     Dates: Start: 02/26/21       Description: 1) Individualized goal: of 500' CGA on flat ground to improve ambulation in the community.   2) Interventions:  PT Group Therapy, PT Gait Training, PT Self Care/Home Eval, PT Therapeutic Exercises, PT Neuro Re-Ed/Balance, PT Aquatic Therapy, PT Therapeutic Activity, and PT Manual Therapy          Goal: LTG-By discharge, patient will transfer one surface to another     Dates: Start: 02/26/21       Description: 1) Individualized goal: Supervised to decrease caregiver burden.   2) Interventions:  PT Group Therapy, PT Gait Training, PT Self Care/Home Eval, PT Therapeutic Exercises, PT Neuro Re-Ed/Balance, PT Aquatic Therapy, PT Therapeutic Activity, and PT Manual Therapy          Goal: LTG-By discharge, patient will ambulate up/down flight of stairs      Dates: Start: 02/26/21       Description: 1) Individualized goal: CGA using 1HR to improve pt's mobility in the community.   2) Interventions:  PT Group Therapy, PT Gait Training, PT Self Care/Home Eval, PT Therapeutic Exercises, PT Neuro Re-Ed/Balance, PT Aquatic Therapy, PT Therapeutic Activity, and PT Manual Therapy          Goal: LTG-By discharge, patient will transfer in/out of a car     Dates: Start: 02/26/21       Description: 1) Individualized goal: Supervised with Min verbal cuing to decrease caregiver burden.  2) Interventions:  PT Group Therapy, PT Gait Training, PT Self Care/Home Eval, PT Therapeutic Exercises, PT Neuro Re-Ed/Balance, PT Aquatic Therapy, PT Therapeutic Activity, and PT Manual Therapy

## 2021-03-01 LAB
SARS-COV-2 RNA RESP QL NAA+PROBE: NOTDETECTED
SPECIMEN SOURCE: NORMAL

## 2021-03-01 PROCEDURE — A9270 NON-COVERED ITEM OR SERVICE: HCPCS | Performed by: PHYSICAL MEDICINE & REHABILITATION

## 2021-03-01 PROCEDURE — 92526 ORAL FUNCTION THERAPY: CPT

## 2021-03-01 PROCEDURE — 97130 THER IVNTJ EA ADDL 15 MIN: CPT

## 2021-03-01 PROCEDURE — 97535 SELF CARE MNGMENT TRAINING: CPT

## 2021-03-01 PROCEDURE — 97129 THER IVNTJ 1ST 15 MIN: CPT

## 2021-03-01 PROCEDURE — 700102 HCHG RX REV CODE 250 W/ 637 OVERRIDE(OP): Performed by: PHYSICAL MEDICINE & REHABILITATION

## 2021-03-01 PROCEDURE — 97110 THERAPEUTIC EXERCISES: CPT

## 2021-03-01 PROCEDURE — U0005 INFEC AGEN DETEC AMPLI PROBE: HCPCS

## 2021-03-01 PROCEDURE — 99232 SBSQ HOSP IP/OBS MODERATE 35: CPT | Performed by: PHYSICAL MEDICINE & REHABILITATION

## 2021-03-01 PROCEDURE — U0003 INFECTIOUS AGENT DETECTION BY NUCLEIC ACID (DNA OR RNA); SEVERE ACUTE RESPIRATORY SYNDROME CORONAVIRUS 2 (SARS-COV-2) (CORONAVIRUS DISEASE [COVID-19]), AMPLIFIED PROBE TECHNIQUE, MAKING USE OF HIGH THROUGHPUT TECHNOLOGIES AS DESCRIBED BY CMS-2020-01-R: HCPCS

## 2021-03-01 PROCEDURE — 99232 SBSQ HOSP IP/OBS MODERATE 35: CPT | Performed by: HOSPITALIST

## 2021-03-01 PROCEDURE — 700102 HCHG RX REV CODE 250 W/ 637 OVERRIDE(OP): Performed by: HOSPITALIST

## 2021-03-01 PROCEDURE — A9270 NON-COVERED ITEM OR SERVICE: HCPCS | Performed by: HOSPITALIST

## 2021-03-01 PROCEDURE — 770010 HCHG ROOM/CARE - REHAB SEMI PRIVAT*

## 2021-03-01 RX ADMIN — CARVEDILOL 3.12 MG: 3.12 TABLET, FILM COATED ORAL at 17:28

## 2021-03-01 RX ADMIN — SENNOSIDES AND DOCUSATE SODIUM 2 TABLET: 8.6; 5 TABLET ORAL at 09:16

## 2021-03-01 RX ADMIN — FUROSEMIDE 10 MG: 20 TABLET ORAL at 04:05

## 2021-03-01 RX ADMIN — POTASSIUM CHLORIDE 10 MEQ: 1500 TABLET, EXTENDED RELEASE ORAL at 09:16

## 2021-03-01 RX ADMIN — APIXABAN 5 MG: 5 TABLET, FILM COATED ORAL at 20:12

## 2021-03-01 RX ADMIN — APIXABAN 5 MG: 5 TABLET, FILM COATED ORAL at 09:16

## 2021-03-01 RX ADMIN — ATORVASTATIN CALCIUM 80 MG: 40 TABLET, FILM COATED ORAL at 20:12

## 2021-03-01 RX ADMIN — LISINOPRIL 2.5 MG: 5 TABLET ORAL at 04:08

## 2021-03-01 RX ADMIN — ERGOCALCIFEROL 50000 UNITS: 1.25 CAPSULE ORAL at 14:31

## 2021-03-01 RX ADMIN — SENNOSIDES AND DOCUSATE SODIUM 2 TABLET: 8.6; 5 TABLET ORAL at 20:12

## 2021-03-01 ASSESSMENT — ACTIVITIES OF DAILY LIVING (ADL): TOILET_TRANSFER_DESCRIPTION: GRAB BAR;INCREASED TIME;SUPERVISION FOR SAFETY;VERBAL CUEING

## 2021-03-01 ASSESSMENT — ENCOUNTER SYMPTOMS
NAUSEA: 0
FEVER: 0
CHILLS: 0
DIARRHEA: 0
NERVOUS/ANXIOUS: 0
VOMITING: 0
SHORTNESS OF BREATH: 0
ABDOMINAL PAIN: 0

## 2021-03-01 NOTE — THERAPY
Occupational Therapy  Daily Treatment     Patient Name: Imani Stephen  Age:  60 y.o., Sex:  female  Medical Record #: [id number]  Today's Date: 3/1/2021     Precautions  Precautions: Fall Risk, Swallow Precautions ( See Comments)  Comments: thickened liquids; expressive aphasia          Subjective       Objective     03/01/21 1401   Precautions   Precautions Fall Risk;Swallow Precautions ( See Comments)   Comments thickened liquids; expressive aphasia    Vitals   O2 Delivery Device None - Room Air   Pain   Intervention Declines   Non Verbal Descriptors   Non Verbal Scale  Calm   Cognition    Speech/ Communication Expressive Aphasia   Level of Consciousness Alert   Functional Level of Assist   Grooming Seated;Supervision   Grooming Description Increased time;Seated in wheelchair at sink;Supervision for safety   Toileting Contact Guard Assist   Toileting Description Grab bar;Increased time;Initial preparation for task;Set-up of equipment;Supervision for safety;Assist to pull pants up   Toilet Transfers Contact Guard Assist   Toilet Transfer Description Grab bar;Increased time;Supervision for safety;Verbal cueing   Standing Upper Body Exercises   Other Exercises Standing at FluidoBike, Level 2, CGA via gait belt, no LOB, seated rest break x 1, 10 mins, .942km.  Guided R hand to pedal, Mod I to maintain grasp and cycle.   Interdisciplinary Plan of Care Collaboration   IDT Collaboration with  Nursing;Certified Nursing Assistant   Patient Position at End of Therapy In Bed;Bed Alarm On;Call Light within Reach;Tray Table within Reach;Phone within Reach   Collaboration Comments CLOF   OT Total Time Spent   OT Individual Total Time Spent (Mins) 30   OT Charge Group   OT Self Care / ADL 1   OT Therapeutic Exercise  1         Assessment    Pt was alert and receptive to OT tx.  Noted expressive aphasia.  Tx emphasis on ADL's and Standing TherEx - see notes above for details.  Strengths: Able to follow instructions, Independent  prior level of function, Manages pain appropriately, Motivated for self care and independence, Pleasant and cooperative, Willingly participates in therapeutic activities  Barriers: Aphasia expressive, Decreased endurance, Generalized weakness, Impaired activity tolerance, Impaired balance, Impaired functional cognition    Plan    Cont'd OT for education (environmental/safety awareness, NRG conservation, AE/DME), neuro re-education RUE, balance, functional strength and endurance for BADL's/IADL's and transfers    Occupational Therapy Goals     Problem: Dressing     Dates: Start: 02/26/21       Goal: STG-Within one week, patient will dress LB     Dates: Start: 02/26/21       Description: 1) Individualized Goal:  with SBA   2) Interventions:  OT Self Care/ADL, OT Cognitive Skill Dev, OT Community Reintegration, OT Manual Ther Technique, OT Neuro Re-Ed/Balance, OT Therapeutic Activity, OT Evaluation, and OT Therapeutic Exercise                  Problem: Functional Transfers     Dates: Start: 02/26/21       Goal: STG-Within one week, patient will transfer to toilet     Dates: Start: 02/26/21       Description: 1) Individualized Goal:  with SBA   2) Interventions:  OT Self Care/ADL, OT Cognitive Skill Dev, OT Community Reintegration, OT Manual Ther Technique, OT Neuro Re-Ed/Balance, OT Therapeutic Activity, OT Evaluation, and OT Therapeutic Exercise                Problem: OT Long Term Goals     Dates: Start: 02/26/21       Goal: LTG-By discharge, patient will complete basic self care tasks     Dates: Start: 02/26/21       Description: 1) Individualized Goal:  with mod I to supervision level   2) Interventions:  OT Self Care/ADL, OT Cognitive Skill Dev, OT Community Reintegration, OT Manual Ther Technique, OT Neuro Re-Ed/Balance, OT Therapeutic Activity, OT Evaluation, and OT Therapeutic Exercise          Goal: LTG-By discharge, patient will perform bathroom transfers     Dates: Start: 02/26/21       Description: 1)  Individualized Goal:  with mod I to supervision level   2) Interventions:  OT Self Care/ADL, OT Cognitive Skill Dev, OT Community Reintegration, OT Manual Ther Technique, OT Neuro Re-Ed/Balance, OT Therapeutic Activity, OT Evaluation, and OT Therapeutic Exercise                Problem: Toileting     Dates: Start: 02/26/21       Goal: STG-Within one week, patient will complete toileting tasks     Dates: Start: 02/26/21       Description: 1) Individualized Goal:  with SBA   2) Interventions:  OT Self Care/ADL, OT Cognitive Skill Dev, OT Community Reintegration, OT Manual Ther Technique, OT Neuro Re-Ed/Balance, OT Therapeutic Activity, OT Evaluation, and OT Therapeutic Exercise

## 2021-03-01 NOTE — PROGRESS NOTES
[facility] Medicine Daily Progress Note    Date of Service  3/1/2021    Chief Complaint:  Hypotension  CHF/Cardiomyopathy  Elevated BNP    Interval History:  No significant events or changes since last visit    Review of Systems  Review of Systems   Constitutional: Negative for chills and fever.   Respiratory: Negative for shortness of breath.    Cardiovascular: Negative for chest pain.   Gastrointestinal: Negative for abdominal pain, diarrhea, nausea and vomiting.   Psychiatric/Behavioral: The patient is not nervous/anxious.         Physical Exam  Temp:  [36.4 °C (97.5 °F)-37.2 °C (98.9 °F)] 36.9 °C (98.4 °F)  Pulse:  [70-88] 88  Resp:  [16-19] 19  BP: ()/(52-71) 93/53  SpO2:  [96 %-98 %] 97 %    Physical Exam  Vitals and nursing note reviewed.   Constitutional:       Appearance: Normal appearance.   HENT:      Head: Atraumatic.   Eyes:      Conjunctiva/sclera: Conjunctivae normal.      Pupils: Pupils are equal, round, and reactive to light.   Neck:      Vascular: No JVD.   Cardiovascular:      Rate and Rhythm: Normal rate and regular rhythm.      Heart sounds: Normal heart sounds. No murmur.   Pulmonary:      Effort: Pulmonary effort is normal.      Breath sounds: No stridor. No wheezing or rales.      Comments: Breath sounds are diminished  Abdominal:      General: There is no distension.      Palpations: Abdomen is soft.      Tenderness: There is no abdominal tenderness.   Musculoskeletal:      Cervical back: Normal range of motion and neck supple.      Right lower leg: No edema.      Left lower leg: No edema.   Skin:     General: Skin is warm and dry.      Findings: No rash.   Neurological:      Mental Status: She is alert and oriented to person, place, and time.   Psychiatric:         Mood and Affect: Mood normal.         Behavior: Behavior normal.         Fluids    Intake/Output Summary (Last 24 hours) at 3/1/2021 1051  Last data filed at 3/1/2021 0405  Gross per 24 hour   Intake 720 ml   Output --   Net  720 ml       Laboratory                        Imaging    Assessment/Plan  * CVA (cerebral vascular accident) (HCC)- (present on admission)  Assessment & Plan  Had a moderate L-MCA stroke  S/P TPA  S/P thrombectomy  On Lipitor  On Eliquis    Hepatocellular dysfunction- (present on admission)  Assessment & Plan  CT Abd (2/11): heterogeneous appearance of the liver with decreased attenuation and periportal edema;                            this likely represents presence of hepatocellular dysfunction                           portal vein is also mildly dilated and there is a small amount of ascites as well as diffuse                            mesenteric edema suggesting a component of portal hypertension.  Needs outpatient follow up and monitoring    Hyponatremia- (present on admission)  Assessment & Plan  Na: 136 --> 140 --> 131 (2/26)  ? 2nd to Lisinopril (new med) --> being d/c'd 2nd to low BP (3/1)  Monitor for now    Hypotension- (present on admission)  Assessment & Plan  (2/26): got hypotensive  (3/1): BP dipping lower again  On Coreg: 3.125 mg bid  On Lisinopril: 5 mg daily --> 2.5 mg daily 2nd to hypotension (2/27) --> will d/c (last dose 3/1)  On Lasix: 20 mg daily --> 10 mg daily 2nd to hypotension (2/27) --> will d/c (last dose (3/1)  Note: on thickened liquids  Cont to monitor    Acute systolic CHF (congestive heart failure) (HCC)- (present on admission)  Assessment & Plan  Echo (2/20): EF 25%, mod MS  BNP: 824 (2/26) -- no other Epic values to compare  On Coreg   On Lisinopril --> will d/c 2nd to low BP  On Lasix: 20 mg daily --> 10 mg daily 2nd to hypotension (2/27) --> will d/c 2nd to low BP and andra on thickened liquids  On KCL: 20 meq daily --> 10 meq daily (2/27) --> will d/c  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

## 2021-03-01 NOTE — THERAPY
Speech Language Pathology  Daily Treatment     Patient Name: Imani Stephen  Age:  60 y.o., Sex:  female  Medical Record #: [id number]  Today's Date: 3/1/2021     Precautions  Precautions: Fall Risk, Swallow Precautions ( See Comments)  Comments: thickened liquids; expressive aphasia     Subjective    Patient pleasant and cooperative.     Objective       03/01/21 1431   Expressive Language   Naming Moderate (3)   Automatic Language Appropriate Minimal (4)   Verbalizes Wants / Needs Minimal (4)   Word Finding Deficits Moderate (3)   Cognition   Orientation  Minimal (4)   Simple Reasoning / Problem Solving Minimal (4)   Functional Problem Solving Moderate (3)   Verbal Sequencing (Simple) Severe (2)   Dysphagia    Other Treatments Therapeutic trials of thin liquids.   Diet / Liquid Recommendation Mildly Thick (2) - (Nectar Thick)   Nutritional Liquid Intake Rating Scale Thickened beverages (mildly thick unless otherwise specified)   Nutritional Food Intake Rating Scale Total oral diet with multiple consistencies but requiring special preparation or compensations   SLP Total Time Spent   SLP Individual Total Time Spent (Mins) 90   Treatment Charges   SLP Swallowing Dysfunction Treatment Swallowing Dysfunction Treatment   SLP Cognitive Skill Development First 15 Minutes 1   SLP Cognitive Skill Development Additional 15 Minutes 3       Assessment    [id number] Patient worked on word finding.  Named common picts with 70% acc with min to mod cues to improve.  Patient followed 1 step (2 critical element) directives with 40% acc with mod to max cues needed to improve.  Patient assessed with thin liquids via cup sip.  She demonstrated no coughing or distress post swallow but did demonstrate throat clearing on 25% of sips taken, post swallow.    [id number]  Patient followed 1 step (2 critical element) directives with 50% acc with mod to max cues needed to improve.  Patient assessed with thin liquids via cup sip.  She demonstrated  no coughing or distress post swallow but did demonstrate throat clearing on 30% of sips taken, post swallow.      Plan    Patient is covid screen negative.  May now participate in t-dine.  Recommend trials of thin liquids, and soft solids.  Target word finding, following 1-2 step directives.      Speech Therapy Problems     Problem: Comprehension STGs     Dates: Start: 02/26/21       Goal: STG-Within one week, patient will     Dates: Start: 02/26/21       Description: 1) Individualized goal:  follow simple 2-step verbal directions with 90% accuracy and minimal cues  2) Interventions:  SLP Speech Language Treatment, SLP Self Care / ADL Training , SLP Cognitive Skill Development, and SLP Group Treatment                    Problem: Expression STGs     Dates: Start: 02/26/21       Goal: STG-Within one week, patient will     Dates: Start: 02/26/21       Description: 1) Individualized goal:  use functional phrases to effectively communicate wants/needs  2) Interventions:  SLP Speech Language Treatment, SLP Self Care / ADL Training , SLP Cognitive Skill Development, and SLP Group Treatment                    Problem: Memory STGs     Dates: Start: 02/26/21       Goal: STG-Within one week, patient will     Dates: Start: 02/26/21       Description: 1) Individualized goal:  use external memory aids (calendar, memory book, white board, etc.) to remain OX4 for 90% of opportunities and minimal cues  2) Interventions:  SLP Speech Language Treatment, SLP Self Care / ADL Training , SLP Cognitive Skill Development, and SLP Group Treatment                    Problem: Speech/Swallowing LTGs     Dates: Start: 02/26/21       Goal: LTG-By discharge, patient will safely swallow     Dates: Start: 02/26/21       Description: 1) Individualized goal:  least restrictive diet without overt s/sx of asp for 100% of PO intake  2) Interventions:  SLP Swallowing Dysfunction Treatment, SLP Oral Pharyngeal Evaluation, SLP Self Care / ADL Training , and  SLP Group Treatment              Goal: LTG-By discharge, patient will     Dates: Start: 02/26/21       Description: 1) Individualized goal: improve cognitive linguistic skills from the severe range to the moderate range as indicated by formal assessment and functional abilities  2) Interventions:  SLP Speech Language Treatment, SLP Self Care / ADL Training , SLP Cognitive Skill Development, and SLP Group Treatment                    Problem: Swallowing STGs     Dates: Start: 02/26/21       Goal: STG-Within one week, patient will     Dates: Start: 02/26/21       Description: 1) Individualized goal:  tolerate minced/moist textures with mildly thick liquids without overt s/sx of sap for 100% of PO intake   2) Interventions:  SLP Swallowing Dysfunction Treatment, SLP Oral Pharyngeal Evaluation, SLP Self Care / ADL Training , and SLP Group Treatment

## 2021-03-01 NOTE — THERAPY
Speech Language Pathology  Daily Treatment     Patient Name: Imani Stephen  Age:  60 y.o., Sex:  female  Medical Record #: [id number]  Today's Date: 2021     Precautions  Precautions: Fall Risk, Swallow Precautions ( See Comments)  Comments: thickened liquids; expressive aphasia     Subjective    Pt pleasant and cooperative during tx.       Objective       21 1501   Expressive Language   Word Finding Deficits Moderate (3)   Cognition   Orientation  Moderate (3)   Functional Problem Solving Severe (2)   Interdisciplinary Plan of Care Collaboration   IDT Collaboration with  Certified Nursing Assistant   Collaboration Comments bring a yogurt to pt.   SLP Total Time Spent   SLP Individual Total Time Spent (Mins) 60   Treatment Charges   SLP Cognitive Skill Development First 15 Minutes 1   SLP Cognitive Skill Development Additional 15 Minutes 3       Assessment    Pt scored 14/30 on the O-log.  Pt required mod-max verbal cues to recall safety items (call light, WC brakes).  [place] category namin words per minute.  Pt required mod cues to recall meals and previous tx.  SLP entered information into her memory book         Plan    Target O-log, memory, word finding    Speech Therapy Problems     Problem: Comprehension STGs     Dates: Start: 21       Goal: STG-Within one week, patient will     Dates: Start: 21       Description: 1) Individualized goal:  follow simple 2-step verbal directions with 90% accuracy and minimal cues  2) Interventions:  SLP Speech Language Treatment, SLP Self Care / ADL Training , SLP Cognitive Skill Development, and SLP Group Treatment                    Problem: Expression STGs     Dates: Start: 21       Goal: STG-Within one week, patient will     Dates: Start: 21       Description: 1) Individualized goal:  use functional phrases to effectively communicate wants/needs  2) Interventions:  SLP Speech Language Treatment, SLP Self Care / ADL Training , SLP  Cognitive Skill Development, and SLP Group Treatment                    Problem: Memory STGs     Dates: Start: 02/26/21       Goal: STG-Within one week, patient will     Dates: Start: 02/26/21       Description: 1) Individualized goal:  use external memory aids (calendar, memory book, white board, etc.) to remain OX4 for 90% of opportunities and minimal cues  2) Interventions:  SLP Speech Language Treatment, SLP Self Care / ADL Training , SLP Cognitive Skill Development, and SLP Group Treatment                    Problem: Speech/Swallowing LTGs     Dates: Start: 02/26/21       Goal: LTG-By discharge, patient will safely swallow     Dates: Start: 02/26/21       Description: 1) Individualized goal:  least restrictive diet without overt s/sx of asp for 100% of PO intake  2) Interventions:  SLP Swallowing Dysfunction Treatment, SLP Oral Pharyngeal Evaluation, SLP Self Care / ADL Training , and SLP Group Treatment              Goal: LTG-By discharge, patient will     Dates: Start: 02/26/21       Description: 1) Individualized goal: improve cognitive linguistic skills from the severe range to the moderate range as indicated by formal assessment and functional abilities  2) Interventions:  SLP Speech Language Treatment, SLP Self Care / ADL Training , SLP Cognitive Skill Development, and SLP Group Treatment                    Problem: Swallowing STGs     Dates: Start: 02/26/21       Goal: STG-Within one week, patient will     Dates: Start: 02/26/21       Description: 1) Individualized goal:  tolerate minced/moist textures with mildly thick liquids without overt s/sx of sap for 100% of PO intake   2) Interventions:  SLP Swallowing Dysfunction Treatment, SLP Oral Pharyngeal Evaluation, SLP Self Care / ADL Training , and SLP Group Treatment

## 2021-03-02 LAB
ANION GAP SERPL CALC-SCNC: 8 MMOL/L (ref 7–16)
BUN SERPL-MCNC: 17 MG/DL (ref 8–22)
CALCIUM SERPL-MCNC: 9.5 MG/DL (ref 8.5–10.5)
CHLORIDE SERPL-SCNC: 99 MMOL/L (ref 96–112)
CO2 SERPL-SCNC: 26 MMOL/L (ref 20–33)
CREAT SERPL-MCNC: 0.68 MG/DL (ref 0.5–1.4)
GLUCOSE SERPL-MCNC: 94 MG/DL (ref 65–99)
MAGNESIUM SERPL-MCNC: 2 MG/DL (ref 1.5–2.5)
NT-PROBNP SERPL IA-MCNC: 730 PG/ML (ref 0–125)
PHOSPHATE SERPL-MCNC: 3.3 MG/DL (ref 2.5–4.5)
POTASSIUM SERPL-SCNC: 4.9 MMOL/L (ref 3.6–5.5)
SODIUM SERPL-SCNC: 133 MMOL/L (ref 135–145)

## 2021-03-02 PROCEDURE — 97110 THERAPEUTIC EXERCISES: CPT

## 2021-03-02 PROCEDURE — A9270 NON-COVERED ITEM OR SERVICE: HCPCS | Performed by: PHYSICAL MEDICINE & REHABILITATION

## 2021-03-02 PROCEDURE — 99232 SBSQ HOSP IP/OBS MODERATE 35: CPT | Performed by: HOSPITALIST

## 2021-03-02 PROCEDURE — 84100 ASSAY OF PHOSPHORUS: CPT

## 2021-03-02 PROCEDURE — 97116 GAIT TRAINING THERAPY: CPT

## 2021-03-02 PROCEDURE — 97530 THERAPEUTIC ACTIVITIES: CPT

## 2021-03-02 PROCEDURE — 99232 SBSQ HOSP IP/OBS MODERATE 35: CPT | Performed by: PHYSICAL MEDICINE & REHABILITATION

## 2021-03-02 PROCEDURE — 80048 BASIC METABOLIC PNL TOTAL CA: CPT

## 2021-03-02 PROCEDURE — 700102 HCHG RX REV CODE 250 W/ 637 OVERRIDE(OP): Performed by: PHYSICAL MEDICINE & REHABILITATION

## 2021-03-02 PROCEDURE — 36415 COLL VENOUS BLD VENIPUNCTURE: CPT

## 2021-03-02 PROCEDURE — 97112 NEUROMUSCULAR REEDUCATION: CPT

## 2021-03-02 PROCEDURE — 770010 HCHG ROOM/CARE - REHAB SEMI PRIVAT*

## 2021-03-02 PROCEDURE — 83880 ASSAY OF NATRIURETIC PEPTIDE: CPT

## 2021-03-02 PROCEDURE — 92526 ORAL FUNCTION THERAPY: CPT

## 2021-03-02 PROCEDURE — 92507 TX SP LANG VOICE COMM INDIV: CPT

## 2021-03-02 PROCEDURE — 97129 THER IVNTJ 1ST 15 MIN: CPT

## 2021-03-02 PROCEDURE — 83735 ASSAY OF MAGNESIUM: CPT

## 2021-03-02 RX ADMIN — ATORVASTATIN CALCIUM 80 MG: 40 TABLET, FILM COATED ORAL at 20:17

## 2021-03-02 RX ADMIN — APIXABAN 5 MG: 5 TABLET, FILM COATED ORAL at 20:17

## 2021-03-02 RX ADMIN — CARVEDILOL 3.12 MG: 3.12 TABLET, FILM COATED ORAL at 17:56

## 2021-03-02 RX ADMIN — APIXABAN 5 MG: 5 TABLET, FILM COATED ORAL at 09:03

## 2021-03-02 ASSESSMENT — 6 MINUTE WALK TEST (6MWT)
NUMBER OF RESTS: 0
GAIT SPEED - METERS PER SECOND: .68
LEVEL OF ASSISTANCE: SUPERVISION
TOTAL DISTANCE WALKED (FT): 806

## 2021-03-02 ASSESSMENT — ACTIVITIES OF DAILY LIVING (ADL)
BED_CHAIR_WHEELCHAIR_TRANSFER_DESCRIPTION: INCREASED TIME;SET-UP OF EQUIPMENT;SUPERVISION FOR SAFETY;VERBAL CUEING
BED_CHAIR_WHEELCHAIR_TRANSFER_DESCRIPTION: INCREASED TIME;INITIAL PREPARATION FOR TASK;SET-UP OF EQUIPMENT;SUPERVISION FOR SAFETY;VERBAL CUEING

## 2021-03-02 ASSESSMENT — GAIT ASSESSMENTS
DEVIATION: BRADYKINETIC;DECREASED BASE OF SUPPORT
DISTANCE (FEET): 1400
GAIT LEVEL OF ASSIST: STAND BY ASSIST
DEVIATION: BRADYKINETIC;DECREASED BASE OF SUPPORT
GAIT LEVEL OF ASSIST: STAND BY ASSIST
DISTANCE (FEET): 846

## 2021-03-02 ASSESSMENT — ENCOUNTER SYMPTOMS
CHILLS: 0
SHORTNESS OF BREATH: 0
NAUSEA: 0
VOMITING: 0
ABDOMINAL PAIN: 0
FEVER: 0

## 2021-03-02 NOTE — CARE PLAN
Problem: Communication  Goal: The ability to communicate needs accurately and effectively will improve  Outcome: PROGRESSING AS EXPECTED  Intervention: Educate patient and significant other/support system about the plan of care, procedures, treatments, medications and allow for questions  Note: Plan of care, meds reviewed and safety measures enforced. Verbalized understanding . Pt able to communicate needs.     Problem: Safety  Goal: Will remain free from falls  Outcome: PROGRESSING AS EXPECTED  Intervention: Implement fall precautions  Flowsheets (Taken 3/2/2021 0251)  Bed Alarm: Yes - Alarm On  Environmental Precautions:   Treaded Slipper Socks on Patient   Bed in Low Position   Personal Belongings, Wastebasket, Call Bell etc. in Easy Reach  Note: Pt free from fall and injury , needs attended. Pt calls appropriately, Aspiration precautions observed , head of bed elevated, meds crushed , given with apple sauce followed with thickened orange juice.     Problem: Pain Management  Goal: Pain level will decrease to patient's comfort goal  Outcome: PROGRESSING AS EXPECTED  Intervention: Educate and implement non-pharmacologic comfort measures. Examples: relaxation, distration, play therapy, activity therapy, massage, etc.  Note: Assessed for pain and discomfort, denies pain, situated in bed, cont. Monitored.

## 2021-03-02 NOTE — PROGRESS NOTES
"Rehab Progress Note     Date of Service: 3/1/2021  Chief Complaint: follow up stroke    Interval Events (Subjective)    Patient seen and examined today in her room.  She is working with the speech therapist on her swallowing and aphasia.  Her language continues to improve.  She denies any pain.  She has no complaints or concerns today.    Objective:  VITAL SIGNS: /65   Pulse 88   Temp 36.6 °C (97.9 °F) (Temporal)   Resp 18   Ht 1.575 m (5' 2\")   Wt 47.1 kg (103 lb 14.4 oz)   SpO2 95%   BMI 19.00 kg/m²   Gen: alert, no apparent distress, thin  Neuro: notable for expressive aphasia    Recent Results (from the past 72 hour(s))   SARS-CoV-2, PCR (In-House)    Collection Time: 03/01/21  4:00 AM   Result Value Ref Range    SARS-CoV-2 Source NP Swab     SARS-CoV-2 by PCR NotDetected        Current Facility-Administered Medications   Medication Frequency   • hydrOXYzine HCl (ATARAX) tablet 50 mg Q6HRS PRN   • melatonin tablet 3 mg HS PRN   • Respiratory Therapy Consult Continuous RT   • Pharmacy Consult Request ...Pain Management Review 1 Each PHARMACY TO DOSE   • hydrALAZINE (APRESOLINE) tablet 10 mg Q8HRS PRN   • acetaminophen (Tylenol) tablet 650 mg Q4HRS PRN   • lactulose 20 GM/30ML solution 30 mL QDAY PRN   • docusate sodium (ENEMEEZ) enema 283 mg QDAY PRN   • fleet enema 133 mL QDAY PRN   • artificial tears ophthalmic solution 1 Drop PRN   • benzocaine-menthol (CEPACOL) lozenge 1 Lozenge Q2HRS PRN   • mag hydrox-al hydrox-simeth (MAALOX PLUS ES or MYLANTA DS) suspension 20 mL Q2HRS PRN   • ondansetron (ZOFRAN ODT) dispertab 4 mg 4X/DAY PRN    Or   • ondansetron (ZOFRAN) syringe/vial injection 4 mg 4X/DAY PRN   • traZODone (DESYREL) tablet 50 mg QHS PRN   • sodium chloride (OCEAN) 0.65 % nasal spray 2 Spray PRN   • midazolam (VERSED) 5 mg/mL (1 mL vial) PRN   • senna-docusate (PERICOLACE or SENOKOT S) 8.6-50 MG per tablet 2 tablet BID    And   • polyethylene glycol/lytes (MIRALAX) PACKET 1 Packet QDAY PRN "    And   • magnesium hydroxide (MILK OF MAGNESIA) suspension 30 mL QDAY PRN    And   • bisacodyl (DULCOLAX) suppository 10 mg QDAY PRN   • atorvastatin (LIPITOR) tablet 80 mg Q EVENING   • apixaban (ELIQUIS) tablet 5 mg BID   • carvedilol (COREG) tablet 3.125 mg BID WITH MEALS   • vitamin D (Ergocalciferol) (DRISDOL) capsule 50,000 Units Q7 DAYS       Orders Placed This Encounter   Procedures   • Diet Order Diet: Level 5 - Minced and Moist (meds - 1:1 with MTL); Liquid level: Level 2 - Mildly Thick; Tray Modifications (optional): No Straws, SLP - 1:1 Supervision by Nursing, SLP - Deliver to Nursing Station     Standing Status:   Standing     Number of Occurrences:   1     Order Specific Question:   Diet:     Answer:   Level 5 - Minced and Moist [24]     Comments:   meds - 1:1 with MTL     Order Specific Question:   Liquid level     Answer:   Level 2 - Mildly Thick     Order Specific Question:   Tray Modifications (optional)     Answer:   No Straws     Order Specific Question:   Tray Modifications (optional)     Answer:   SLP - 1:1 Supervision by Nursing     Order Specific Question:   Tray Modifications (optional)     Answer:   SLP - Deliver to Nursing Station       Assessment:  Active Hospital Problems    Diagnosis    • *CVA (cerebral vascular accident) (HCC)    • Crohn disease (HCC)    • Vitamin D deficiency    • Hypotension    • Hyponatremia    • Hepatocellular dysfunction    • Other hyperlipidemia    • Acute systolic CHF (congestive heart failure) (HCC)    • Methamphetamine abuse (HCC)    • Tobacco dependence      This patient is a 60 y.o. female admitted for acute inpatient rehabilitation with CVA (cerebral vascular accident) (HCC).    Therapy notes reviewed.    We will have her first weekly conference on Wednesday to discuss a discharge date.    Medical Decision Making and Plan:    Left MCA M1 occlusion  S/p tPA  S/p thrombectomy  Dyphagia, improved  Aphasia, expressive more than receptive  Continue full rehab  program  PT/OT/SLP, 1 hr each discipline, 5 days per week  Continue apixaban and atorvastatin for secondary stroke prophylaxis  Outpatient follow-up with [facility] and Dr. Cornelius    Anxiety/depression  Unclear if she's been on medication in the past  Monitor need for medication intervention  Consult psychology if needed  Mood currently stable     Meth use  Patient denies, but +UDS  Counseling     Tobacco use  Will need to provide counseling to patient and SO     Crohn's disease  Unclear if she has pain/diarrhea/constipation  Did have recent ED visit for GI complaints, left AMA     Pleural effusions  Discontinued Lasix due to hypotension  Chest x-ray with small left pleural effusion     Portal hypertension  Ascites  Likely from cirrhosis/hepatocellular dysfunction based upon scan  Monitor for fluid overload     Thyroid nodules  Normal TSH  Outpatient monitoring     Renal mass  Stable  Outpatient monitoring     Acute systolic CHF, EF 25%  Cardiomyopathy  Suspect from drug use  Lasix discontinued due to hypotension  Coreg  Hospitalist consult, appreciate assistance  Lisinopril discontinued due to hypotension     Mitral stenosis  Monitor     Hyperlipidemia  Statin     Normocytic anemia  Mild, monitor    Hyponatremia  Discontinued Lasix  Monitor    Vitamin D deficiency  Continue supplementation    Bowel program  Continue bowel medications  Scheduled Sennakot  PRN Miralax, MOM, bisacodyl suppository  Last BM 3/1    Bladder program  Check PVRs - 38, 26, 17  Bladder scan for no voids  ICP for over 400 cc  Scheduled toileting    DVT prophylaxis  Eliquis    Total time:  25 minutes.  I spent greater than 50% of the time for patient care, counseling, and coordination on this date, including patient face-to face time, unit/floor time with review of records/pertinent lab data and studies, as well as discussing diagnostic evaluation/work up, planned therapeutic interventions, and future disposition of care, as per the  interval events/subjective and the assessment and plan as noted above.    I have performed a physical exam, reviewed and updated ROS, as well as the assessment and plan today 3/1/2021. In review of note from 2/26/2021 there are no new changes except as documented above.          Maryana Flanagan M.D.   Physical Medicine and Rehabilitation

## 2021-03-02 NOTE — THERAPY
"Occupational Therapy  Daily Treatment     Patient Name: Imani Stephen  Age:  60 y.o., Sex:  female  Medical Record #: [id number]  Today's Date: 3/2/2021     Precautions  Precautions: Fall Risk, Swallow Precautions ( See Comments)  Comments: Mildly thickened liquids, Minced & Moist textures; Expressive Aphasia; mild impulsivity         Subjective  Pt in bed, awake and receptive to OT tx     Objective       03/02/21 1401   Precautions   Precautions Fall Risk;Swallow Precautions ( See Comments)   Comments Mildly thickened liquids, Minced & Moist textures; Expressive Aphasia; mild impulsivity   Vitals   O2 Delivery Device None - Room Air   Pain   Intervention Declines   Pain 0 - 10 Group   Therapist Pain Assessment 0   Non Verbal Descriptors   Non Verbal Scale  Calm   Cognition    Level of Consciousness Alert   Comments Standing at raised plinth (used as table top) for 24 piece puzzle activity, standing 30 mins w/ no LOB nor request for seated rets break, no noted visual field cut, integration of R hand as a functional assist.  Pt required max VQ's to problem solve puzzle activity.  Therapist broke down puzzle to color and object - \"Undersea \" jumbo puzzle - After 5 mins of picking up and putting down random pieces with no order/strategy, therapist directed pt to identify a prominen color (eg - green) therapist pointed to the green colored creature on the box of the puzzle and asked if pt knew what creature it was - she responded, \"eel\".  Therapist VQ's to direct pt to collect the \"green\" pieces.  Max assist with step by step direction.  Toward end of puzzle pt required less VQ's.  But had difficulty turning pieces for an appropriate fit.  Max VQ's and additional time.   Sleep/Wake Cycle   Sleep & Rest Awake   Standing Upper Body Exercises   Comments Standing on [employer] ball 4' from rebounder using a soccer ball, 4x50, no LOB, dropped ball x 1, seated rest break between sets.   Sitting Lower Body Exercises   Nustep " [id number] Score for Atrial Fibrillation Stroke Risk   Risk   Factors  Component Value   C CHF No 0   H HTN No 0   A2 Age >= 76 No,  (62 y.o.) 0   D DM No 0   S2 Prior Stroke/TIA No 0   V Vascular Disease No 0   A Age 74-69 No,  (62 y.o.) 0   Sc Sex female 1    [id number]  Score  1   Score last updated 0/61/56 8:40 PM    Click here for a link to the UpToDate guideline \"Atrial Fibrillation: Anticoagulation therapy to prevent embolization    Disclaimer: Risk Score calculation is dependent on accuracy of patient problem list and past encounter diagnosis. Resistance Level 2  (12 mins, rest break x 2, .28 miles.)   Interdisciplinary Plan of Care Collaboration   IDT Collaboration with  Certified Nursing Assistant   Patient Position at End of Therapy In Bed;Bed Alarm On;Call Light within Reach;Tray Table within Reach;Phone within Reach   OT Total Time Spent   OT Individual Total Time Spent (Mins) 60   OT Charge Group   OT Therapy Activity 2   OT Therapeutic Exercise  2       Assessment    Pt was alert and cooperative w/ tx.  tx emphasis on TherEx, standing balance/endurance, RUE integration, visual assessment - no visual field cut, followed object in all planes/quadrants and problem solving - refer to otes above for details.  Strengths: Able to follow instructions, Independent prior level of function, Manages pain appropriately, Motivated for self care and independence, Pleasant and cooperative, Willingly participates in therapeutic activities  Barriers: Aphasia expressive, Decreased endurance, Generalized weakness, Impaired activity tolerance, Impaired balance, Impaired functional cognition    Plan    Cont'd OT for education (environmental/safety awareness, NRG conservation, AE/DME), problem solving, balance, functional strength and endurance for BADL's/IADL's and transfers    Occupational Therapy Goals     Problem: Dressing     Dates: Start: 02/26/21       Goal: STG-Within one week, patient will dress LB     Dates: Start: 02/26/21       Description: 1) Individualized Goal:  with SBA   2) Interventions:  OT Self Care/ADL, OT Cognitive Skill Dev, OT Community Reintegration, OT Manual Ther Technique, OT Neuro Re-Ed/Balance, OT Therapeutic Activity, OT Evaluation, and OT Therapeutic Exercise                  Problem: Functional Transfers     Dates: Start: 02/26/21       Goal: STG-Within one week, patient will transfer to toilet     Dates: Start: 02/26/21       Description: 1) Individualized Goal:  with SBA   2) Interventions:  OT Self Care/ADL, OT Cognitive Skill Dev, OT  Community Reintegration, OT Manual Ther Technique, OT Neuro Re-Ed/Balance, OT Therapeutic Activity, OT Evaluation, and OT Therapeutic Exercise                Problem: OT Long Term Goals     Dates: Start: 02/26/21       Goal: LTG-By discharge, patient will complete basic self care tasks     Dates: Start: 02/26/21       Description: 1) Individualized Goal:  with mod I to supervision level   2) Interventions:  OT Self Care/ADL, OT Cognitive Skill Dev, OT Community Reintegration, OT Manual Ther Technique, OT Neuro Re-Ed/Balance, OT Therapeutic Activity, OT Evaluation, and OT Therapeutic Exercise          Goal: LTG-By discharge, patient will perform bathroom transfers     Dates: Start: 02/26/21       Description: 1) Individualized Goal:  with mod I to supervision level   2) Interventions:  OT Self Care/ADL, OT Cognitive Skill Dev, OT Community Reintegration, OT Manual Ther Technique, OT Neuro Re-Ed/Balance, OT Therapeutic Activity, OT Evaluation, and OT Therapeutic Exercise                Problem: Toileting     Dates: Start: 02/26/21       Goal: STG-Within one week, patient will complete toileting tasks     Dates: Start: 02/26/21       Description: 1) Individualized Goal:  with SBA   2) Interventions:  OT Self Care/ADL, OT Cognitive Skill Dev, OT Community Reintegration, OT Manual Ther Technique, OT Neuro Re-Ed/Balance, OT Therapeutic Activity, OT Evaluation, and OT Therapeutic Exercise

## 2021-03-02 NOTE — THERAPY
Speech Language Pathology  Daily Treatment     Patient Name: Imani Stephen  Age:  60 y.o., Sex:  female  Medical Record #: [id number]  Today's Date: 3/2/2021     Precautions  Precautions: Fall Risk, Swallow Precautions ( See Comments)  Comments: Mildly thickened liquids, Minced & Moist textures; Expressive Aphasia; mild impulsivity    Subjective    Patient pleasant and cooperative.     Objective       03/02/21 0901   Receptive Language / Auditory Comprehension   Follows One Unit Commands Minimal (4)   Follows Two Unit Commands Minimal (4)   Expressive Language   Naming Minimal (4)   Automatic Language Appropriate Supervision (5)   Verbalizes Wants / Needs Minimal (4)   Word Finding Deficits Moderate (3)   Dysphagia    Other Treatments Therapeutic trials of thin liquids via cup sip.   Diet / Liquid Recommendation Thin (0)   Nutritional Liquid Intake Rating Scale Non thickened beverages   Nutritional Food Intake Rating Scale Total oral diet with multiple consistencies but requiring special preparation or compensations   SLP Total Time Spent   SLP Individual Total Time Spent (Mins) 60   Treatment Charges   SLP Treatment - Individual Speech Language Treatment - Individual   SLP Swallowing Dysfunction Treatment Swallowing Dysfunction Treatment   SLP Cognitive Skill Development First 15 Minutes 1       Assessment    O-log 21/30.  Patient named common pictures (20/20) 100%, 30% to  Describe function.  Patient able to better follow 1 step (2 critical element) written directives with 80% acc after set up.  Performed 2 step with (4 critical elements with 60% acc with mod cues to recognize errors and correct.    Patient assessed with thin liquids via cup sip with double swallow/effortful swallow x20.  No coughing post swallow.  Patient did demonstrate throat clear x2 swallow. But tolerated well overall.  Recommend advance to thin liquids.            Plan    Trial (6) soft and bite sized, target description of function,  following 1-2 step directions.    Speech Therapy Problems     Problem: Comprehension STGs     Dates: Start: 02/26/21       Goal: STG-Within one week, patient will     Dates: Start: 02/26/21       Description: 1) Individualized goal:  follow simple 2-step verbal directions with 90% accuracy and minimal cues  2) Interventions:  SLP Speech Language Treatment, SLP Self Care / ADL Training , SLP Cognitive Skill Development, and SLP Group Treatment                    Problem: Expression STGs     Dates: Start: 02/26/21       Goal: STG-Within one week, patient will     Dates: Start: 02/26/21       Description: 1) Individualized goal:  use functional phrases to effectively communicate wants/needs  2) Interventions:  SLP Speech Language Treatment, SLP Self Care / ADL Training , SLP Cognitive Skill Development, and SLP Group Treatment                    Problem: Memory STGs     Dates: Start: 02/26/21       Goal: STG-Within one week, patient will     Dates: Start: 02/26/21       Description: 1) Individualized goal:  use external memory aids (calendar, memory book, white board, etc.) to remain OX4 for 90% of opportunities and minimal cues  2) Interventions:  SLP Speech Language Treatment, SLP Self Care / ADL Training , SLP Cognitive Skill Development, and SLP Group Treatment                    Problem: Speech/Swallowing LTGs     Dates: Start: 02/26/21       Goal: LTG-By discharge, patient will safely swallow     Dates: Start: 02/26/21       Description: 1) Individualized goal:  least restrictive diet without overt s/sx of asp for 100% of PO intake  2) Interventions:  SLP Swallowing Dysfunction Treatment, SLP Oral Pharyngeal Evaluation, SLP Self Care / ADL Training , and SLP Group Treatment              Goal: LTG-By discharge, patient will     Dates: Start: 02/26/21       Description: 1) Individualized goal: improve cognitive linguistic skills from the severe range to the moderate range as indicated by formal assessment and  functional abilities  2) Interventions:  SLP Speech Language Treatment, SLP Self Care / ADL Training , SLP Cognitive Skill Development, and SLP Group Treatment                    Problem: Swallowing STGs     Dates: Start: 02/26/21       Goal: STG-Within one week, patient will     Dates: Start: 02/26/21       Description: 1) Individualized goal:  tolerate minced/moist textures with mildly thick liquids without overt s/sx of sap for 100% of PO intake   2) Interventions:  SLP Swallowing Dysfunction Treatment, SLP Oral Pharyngeal Evaluation, SLP Self Care / ADL Training , and SLP Group Treatment

## 2021-03-02 NOTE — CARE PLAN
Needs cues for brakes on the w/c and waiting for assist in the bathroom. She transferred herself off the toilet with nurse right outside door.  Problem: Safety  Goal: Will remain free from injury  Outcome: PROGRESSING AS EXPECTED  Goal: Will remain free from falls  Outcome: PROGRESSING AS EXPECTED

## 2021-03-02 NOTE — THERAPY
Physical Therapy   Daily Treatment     Patient Name: Imani Stephen  Age:  60 y.o., Sex:  female  Medical Record #: [id number]  Today's Date: 3/2/2021     Precautions  Precautions: Fall Risk, Swallow Precautions ( See Comments)  Comments: thickened liquids; expressive aphasia     Subjective    Pt received sitting at bedside in WC. Pt agreeable to tx.      Objective     03/02/21 1031   Pain   Non Verbal Scale  Calm   Cognition    Level of Consciousness Alert   Sleep/Wake Cycle   Sleep & Rest Awake   Gait Functional Level of Assist    Gait Level Of Assist Stand by Assist   Assistive Device None   Distance (Feet) 846  (During 6mwt)   # of Times Distance was Traveled 1   Deviation Bradykinetic;Decreased Base Of Support   Transfer Functional Level of Assist   Bed, Chair, Wheelchair Transfer Stand by Assist   Bed Chair Wheelchair Transfer Description Increased time;Initial preparation for task;Set-up of equipment;Supervision for safety;Verbal cueing   Neuro-Muscular Treatments   Neuro-Muscular Treatments Anterior weight shift;Weight Shift Right;Weight Shift Left   Comments Balance and proprioception training on foam pad with baloon pass CGA x1 LOB Yesica to correct. t  (1x3min with feet shoulderwidth apart, 1x 3min feet 2in apart)   Outcome Measures   Outcome Measures Utilized 6 Minute Walk Test   6 Minute Walk Test   Distance (feet) 806   Gait Speed (meters per second) 0.68   Number of Rests 0   Level of Assistance 5         Assessment    Pt demonstrated minor gait deviations and miss-steps, especially at beginning of 6mwt. Some increase in ankle strategy on foam pad with feet close together, pt accepted minor challenge during balance activity.   Strengths: Pleasant and cooperative, Willingly participates in therapeutic activities  Barriers: Aphasia expressive, Decreased endurance, Difficulty following instructions, Fatigue, Hypotension, Impaired activity tolerance, Impaired balance, Impulsive    Plan    Improve LE strength  and balance. Progress endurance and activity tolerance.       Physical Therapy Problems     Problem: Mobility     Dates: Start: 02/26/21       Goal: STG-Within one week, patient will propel wheelchair household distances     Dates: Start: 02/26/21       Description: 1) Individualized goal: of 150' on flat ground Rashawn and minimal verbal cues for steering.   2) Interventions:  PT Group Therapy, PT Gait Training, PT Self Care/Home Eval, PT Therapeutic Exercises, PT Neuro Re-Ed/Balance, PT Aquatic Therapy, PT Therapeutic Activity, and PT Manual Therapy            Goal: STG-Within one week, patient will ambulate household distance     Dates: Start: 02/26/21       Description: 1) Individualized goal: of 150' CGA, no AD on flat ground to improve ambulation in the home.  2) Interventions:  PT Group Therapy, PT Gait Training, PT Self Care/Home Eval, PT Therapeutic Exercises, PT Neuro Re-Ed/Balance, PT Aquatic Therapy, PT Therapeutic Activity, and PT Manual Therapy          Goal: STG-Within one week, patient will ascend and descend four to six stairs     Dates: Start: 02/26/21       Description: 1) Individualized goal: CGA with 1HR, no AD.  2) Interventions:  PT Group Therapy, PT Gait Training, PT Self Care/Home Eval, PT Therapeutic Exercises, PT Neuro Re-Ed/Balance, PT Aquatic Therapy, PT Therapeutic Activity, and PT Manual Therapy                Problem: Mobility Transfers     Dates: Start: 02/26/21       Goal: STG-Within one week, patient will perform bed mobility     Dates: Start: 02/26/21       Description: 1) Individualized goal: independently to improve independence in home.   2) Interventions:  PT Group Therapy, PT Gait Training, PT Self Care/Home Eval, PT Therapeutic Exercises, PT Neuro Re-Ed/Balance, PT Aquatic Therapy, PT Therapeutic Activity, and PT Manual Therapy                Problem: PT-Long Term Goals     Dates: Start: 02/26/21       Goal: LTG-By discharge, patient will propel wheelchair     Dates: Start:  02/26/21       Description: 1) Individualized goal: of 150' on flat ground supervised and minimal verbal cues for steering.   2) Interventions:  PT Group Therapy, PT Gait Training, PT Self Care/Home Eval, PT Therapeutic Exercises, PT Neuro Re-Ed/Balance, PT Aquatic Therapy, PT Therapeutic Activity, and PT Manual Therapy          Goal: LTG-By discharge, patient will ambulate     Dates: Start: 02/26/21       Description: 1) Individualized goal: of 500' CGA on flat ground to improve ambulation in the community.   2) Interventions:  PT Group Therapy, PT Gait Training, PT Self Care/Home Eval, PT Therapeutic Exercises, PT Neuro Re-Ed/Balance, PT Aquatic Therapy, PT Therapeutic Activity, and PT Manual Therapy          Goal: LTG-By discharge, patient will transfer one surface to another     Dates: Start: 02/26/21       Description: 1) Individualized goal: Supervised to decrease caregiver burden.   2) Interventions:  PT Group Therapy, PT Gait Training, PT Self Care/Home Eval, PT Therapeutic Exercises, PT Neuro Re-Ed/Balance, PT Aquatic Therapy, PT Therapeutic Activity, and PT Manual Therapy          Goal: LTG-By discharge, patient will ambulate up/down flight of stairs     Dates: Start: 02/26/21       Description: 1) Individualized goal: CGA using 1HR to improve pt's mobility in the community.   2) Interventions:  PT Group Therapy, PT Gait Training, PT Self Care/Home Eval, PT Therapeutic Exercises, PT Neuro Re-Ed/Balance, PT Aquatic Therapy, PT Therapeutic Activity, and PT Manual Therapy          Goal: LTG-By discharge, patient will transfer in/out of a car     Dates: Start: 02/26/21       Description: 1) Individualized goal: Supervised with Min verbal cuing to decrease caregiver burden.  2) Interventions:  PT Group Therapy, PT Gait Training, PT Self Care/Home Eval, PT Therapeutic Exercises, PT Neuro Re-Ed/Balance, PT Aquatic Therapy, PT Therapeutic Activity, and PT Manual Therapy

## 2021-03-03 DIAGNOSIS — R47.01 APHASIA: ICD-10-CM

## 2021-03-03 DIAGNOSIS — I42.0 DILATED CARDIOMYOPATHY (HCC): ICD-10-CM

## 2021-03-03 DIAGNOSIS — I63.312 CEREBROVASCULAR ACCIDENT (CVA) DUE TO THROMBOSIS OF LEFT MIDDLE CEREBRAL ARTERY (HCC): ICD-10-CM

## 2021-03-03 PROCEDURE — 99233 SBSQ HOSP IP/OBS HIGH 50: CPT | Performed by: PHYSICAL MEDICINE & REHABILITATION

## 2021-03-03 PROCEDURE — 92507 TX SP LANG VOICE COMM INDIV: CPT

## 2021-03-03 PROCEDURE — 97530 THERAPEUTIC ACTIVITIES: CPT

## 2021-03-03 PROCEDURE — 770010 HCHG ROOM/CARE - REHAB SEMI PRIVAT*

## 2021-03-03 PROCEDURE — 700102 HCHG RX REV CODE 250 W/ 637 OVERRIDE(OP): Performed by: PHYSICAL MEDICINE & REHABILITATION

## 2021-03-03 PROCEDURE — 97116 GAIT TRAINING THERAPY: CPT

## 2021-03-03 PROCEDURE — A9270 NON-COVERED ITEM OR SERVICE: HCPCS | Performed by: PHYSICAL MEDICINE & REHABILITATION

## 2021-03-03 PROCEDURE — 92526 ORAL FUNCTION THERAPY: CPT

## 2021-03-03 PROCEDURE — 97110 THERAPEUTIC EXERCISES: CPT

## 2021-03-03 PROCEDURE — 97112 NEUROMUSCULAR REEDUCATION: CPT

## 2021-03-03 PROCEDURE — 97129 THER IVNTJ 1ST 15 MIN: CPT

## 2021-03-03 PROCEDURE — 99232 SBSQ HOSP IP/OBS MODERATE 35: CPT | Performed by: HOSPITALIST

## 2021-03-03 PROCEDURE — 97535 SELF CARE MNGMENT TRAINING: CPT

## 2021-03-03 RX ADMIN — ATORVASTATIN CALCIUM 80 MG: 40 TABLET, FILM COATED ORAL at 21:26

## 2021-03-03 RX ADMIN — SENNOSIDES AND DOCUSATE SODIUM 2 TABLET: 8.6; 5 TABLET ORAL at 21:27

## 2021-03-03 RX ADMIN — CARVEDILOL 3.12 MG: 3.12 TABLET, FILM COATED ORAL at 08:28

## 2021-03-03 RX ADMIN — APIXABAN 5 MG: 5 TABLET, FILM COATED ORAL at 21:27

## 2021-03-03 RX ADMIN — APIXABAN 5 MG: 5 TABLET, FILM COATED ORAL at 08:28

## 2021-03-03 ASSESSMENT — ACTIVITIES OF DAILY LIVING (ADL)
TUB_SHOWER_TRANSFER_DESCRIPTION: SUPERVISION FOR SAFETY
TOILET_TRANSFER_DESCRIPTION: SUPERVISION FOR SAFETY

## 2021-03-03 ASSESSMENT — GAIT ASSESSMENTS
GAIT LEVEL OF ASSIST: SUPERVISED
DEVIATION: BRADYKINETIC
DISTANCE (FEET): 500

## 2021-03-03 NOTE — CARE PLAN
Problem: Mobility  Goal: STG-Within one week, patient will propel wheelchair household distances  Description: 1) Individualized goal: of 150' on flat ground Rashawn and minimal verbal cues for steering.   2) Interventions:  PT Group Therapy, PT Gait Training, PT Self Care/Home Eval, PT Therapeutic Exercises, PT Neuro Re-Ed/Balance, PT Aquatic Therapy, PT Therapeutic Activity, and PT Manual Therapy    3/3/2021 1402 by Alicja Sarmiento, Student  Note: Not appropriate for CLOF.   3/3/2021 1335 by Alicja Sarmiento, Student  Outcome: NOT MET  Goal: STG-Within one week, patient will ambulate household distance  Description: 1) Individualized goal: of 150' CGA, no AD on flat ground to improve ambulation in the home.  2) Interventions:  PT Group Therapy, PT Gait Training, PT Self Care/Home Eval, PT Therapeutic Exercises, PT Neuro Re-Ed/Balance, PT Aquatic Therapy, PT Therapeutic Activity, and PT Manual Therapy  Outcome: MET  Goal: STG-Within one week, patient will ascend and descend four to six stairs  Description: 1) Individualized goal: CGA with 1HR, no AD.  2) Interventions:  PT Group Therapy, PT Gait Training, PT Self Care/Home Eval, PT Therapeutic Exercises, PT Neuro Re-Ed/Balance, PT Aquatic Therapy, PT Therapeutic Activity, and PT Manual Therapy  3/3/2021 1402 by Alicja Sarmiento, Student  Note: Not attempted due to safety.  3/3/2021 1335 by Alicja Sarmiento, Student  Outcome: NOT MET     Problem: Mobility Transfers  Goal: STG-Within one week, patient will perform bed mobility  Description: 1) Individualized goal: independently to improve independence in home.   2) Interventions:  PT Group Therapy, PT Gait Training, PT Self Care/Home Eval, PT Therapeutic Exercises, PT Neuro Re-Ed/Balance, PT Aquatic Therapy, PT Therapeutic Activity, and PT Manual Therapy  Outcome: MET

## 2021-03-03 NOTE — PROGRESS NOTES
Hospital Medicine Daily Progress Note      Chief Complaint:  Hypotension  CHF/Cardiomyopathy  Elevated BNP    Interval History:  No overnight events.    Review of Systems  Review of Systems   Constitutional: Negative for chills and fever.   HENT: Negative.    Eyes: Negative.    Respiratory: Negative for cough and shortness of breath.    Cardiovascular: Negative for chest pain and palpitations.   Gastrointestinal: Negative for abdominal pain, nausea and vomiting.   Genitourinary: Negative.    Musculoskeletal: Negative.    Skin: Negative for itching and rash.   Neurological: Positive for speech change.   Endo/Heme/Allergies: Negative for polydipsia. Does not bruise/bleed easily.        Physical Exam  Temp:  [36.3 °C (97.4 °F)-36.4 °C (97.6 °F)] 36.4 °C (97.6 °F)  Pulse:  [81-89] 81  Resp:  [18] 18  BP: ()/(62-76) 119/76  SpO2:  [94 %-96 %] 94 %    Physical Exam  Vitals reviewed.   Constitutional:       General: She is not in acute distress.     Appearance: Normal appearance. She is not ill-appearing.   HENT:      Head: Normocephalic and atraumatic.      Right Ear: External ear normal.      Left Ear: External ear normal.      Nose: Nose normal.      Mouth/Throat:      Pharynx: Oropharynx is clear.   Eyes:      General:         Right eye: No discharge.         Left eye: No discharge.      Extraocular Movements: Extraocular movements intact.      Conjunctiva/sclera: Conjunctivae normal.   Neck:      Vascular: No JVD.   Cardiovascular:      Rate and Rhythm: Normal rate and regular rhythm.   Pulmonary:      Effort: No respiratory distress.      Breath sounds: No wheezing.      Comments: Decreased BS   Abdominal:      General: Bowel sounds are normal. There is no distension.      Palpations: Abdomen is soft.      Tenderness: There is no abdominal tenderness.   Musculoskeletal:      Cervical back: Normal range of motion and neck supple.      Right lower leg: No edema.      Left lower leg: No edema.   Skin:     General:  Skin is warm and dry.   Neurological:      Mental Status: She is alert and oriented to person, place, and time.         Fluids    Intake/Output Summary (Last 24 hours) at 3/4/2021 1118  Last data filed at 3/4/2021 0854  Gross per 24 hour   Intake 780 ml   Output --   Net 780 ml       Laboratory  Recent Labs     03/04/21  0618   WBC 4.1*   RBC 4.07*   HEMOGLOBIN 12.6   HEMATOCRIT 38.7   MCV 95.1   MCH 31.0   MCHC 32.6*   RDW 48.5   PLATELETCT 337   MPV 10.2     Recent Labs     03/02/21  0551 03/04/21  0618   SODIUM 133* 134*   POTASSIUM 4.9 4.4   CHLORIDE 99 100   CO2 26 26   GLUCOSE 94 92   BUN 17 17   CREATININE 0.68 0.49*   CALCIUM 9.5 9.1                   Assessment/Plan  * CVA (cerebral vascular accident) (HCC)- (present on admission)  Assessment & Plan  Has aphasia  S/P TPA and Thrombectomy  On Eliquis and Lipitor    Hepatocellular dysfunction- (present on admission)  Assessment & Plan  Needs outpt GI F/U    Hypotension- (present on admission)  Assessment & Plan  Observe blood pressure trends on Coreg  Discontinued Lisinopril and Lasix for hypotension    Acute systolic CHF (congestive heart failure) (HCC)- (present on admission)  Assessment & Plan  Echo EF 25% w/ mod MS  Cardiology suspects CDM 2/2 Meth abuse  Lisinopril and Lasix discontinued for hypotension  Continue Coreg  Needs Cardiology F/U    Other hyperlipidemia- (present on admission)  Assessment & Plan  On Lipitor    Methamphetamine abuse (HCC)- (present on admission)  Assessment & Plan  Chronic history    Crohn disease (HCC)- (present on admission)  Assessment & Plan  Chronic history    Vitamin D deficiency- (present on admission)  Assessment & Plan  VIt D level 16  On high dose supplementation  Suggest repeat levels prior to discharge and adjust dose accordingly    Full Code

## 2021-03-03 NOTE — PROGRESS NOTES
"Rehab Progress Note     Date of Service: 3/3/2021  Chief Complaint: follow up stroke    Interval Events (Subjective)    Patient seen and evaluated today in her room.  She is doing so well functionally we feel like she will be ready for discharge home on Friday.  Patient is very happy about this.  She has no new complaints.    Objective:  VITAL SIGNS: BP (!) 98/60   Pulse 88   Temp 36.5 °C (97.7 °F) (Oral)   Resp 18   Ht 1.575 m (5' 2\")   Wt 47.1 kg (103 lb 14.4 oz)   SpO2 99%   BMI 19.00 kg/m²   Gen: alert, no apparent distress  Neuro: notable for expressive aphasia    Recent Results (from the past 72 hour(s))   SARS-CoV-2, PCR (In-House)    Collection Time: 03/01/21  4:00 AM   Result Value Ref Range    SARS-CoV-2 Source NP Swab     SARS-CoV-2 by PCR NotDetected    Basic Metabolic Panel    Collection Time: 03/02/21  5:51 AM   Result Value Ref Range    Sodium 133 (L) 135 - 145 mmol/L    Potassium 4.9 3.6 - 5.5 mmol/L    Chloride 99 96 - 112 mmol/L    Co2 26 20 - 33 mmol/L    Glucose 94 65 - 99 mg/dL    Bun 17 8 - 22 mg/dL    Creatinine 0.68 0.50 - 1.40 mg/dL    Calcium 9.5 8.5 - 10.5 mg/dL    Anion Gap 8.0 7.0 - 16.0   MAGNESIUM    Collection Time: 03/02/21  5:51 AM   Result Value Ref Range    Magnesium 2.0 1.5 - 2.5 mg/dL   PHOSPHORUS    Collection Time: 03/02/21  5:51 AM   Result Value Ref Range    Phosphorus 3.3 2.5 - 4.5 mg/dL   proBrain Natriuretic Peptide, NT    Collection Time: 03/02/21  5:51 AM   Result Value Ref Range    NT-proBNP 730 (H) 0 - 125 pg/mL   ESTIMATED GFR    Collection Time: 03/02/21  5:51 AM   Result Value Ref Range    GFR If African American >60 >60 mL/min/1.73 m 2    GFR If Non African American >60 >60 mL/min/1.73 m 2       Current Facility-Administered Medications   Medication Frequency   • hydrOXYzine HCl (ATARAX) tablet 50 mg Q6HRS PRN   • melatonin tablet 3 mg HS PRN   • Respiratory Therapy Consult Continuous RT   • Pharmacy Consult Request ...Pain Management Review 1 Each PHARMACY " TO DOSE   • hydrALAZINE (APRESOLINE) tablet 10 mg Q8HRS PRN   • acetaminophen (Tylenol) tablet 650 mg Q4HRS PRN   • lactulose 20 GM/30ML solution 30 mL QDAY PRN   • docusate sodium (ENEMEEZ) enema 283 mg QDAY PRN   • fleet enema 133 mL QDAY PRN   • artificial tears ophthalmic solution 1 Drop PRN   • benzocaine-menthol (CEPACOL) lozenge 1 Lozenge Q2HRS PRN   • mag hydrox-al hydrox-simeth (MAALOX PLUS ES or MYLANTA DS) suspension 20 mL Q2HRS PRN   • ondansetron (ZOFRAN ODT) dispertab 4 mg 4X/DAY PRN    Or   • ondansetron (ZOFRAN) syringe/vial injection 4 mg 4X/DAY PRN   • traZODone (DESYREL) tablet 50 mg QHS PRN   • sodium chloride (OCEAN) 0.65 % nasal spray 2 Spray PRN   • midazolam (VERSED) 5 mg/mL (1 mL vial) PRN   • senna-docusate (PERICOLACE or SENOKOT S) 8.6-50 MG per tablet 2 tablet BID    And   • polyethylene glycol/lytes (MIRALAX) PACKET 1 Packet QDAY PRN    And   • magnesium hydroxide (MILK OF MAGNESIA) suspension 30 mL QDAY PRN    And   • bisacodyl (DULCOLAX) suppository 10 mg QDAY PRN   • atorvastatin (LIPITOR) tablet 80 mg Q EVENING   • apixaban (ELIQUIS) tablet 5 mg BID   • carvedilol (COREG) tablet 3.125 mg BID WITH MEALS   • vitamin D (Ergocalciferol) (DRISDOL) capsule 50,000 Units Q7 DAYS       Orders Placed This Encounter   Procedures   • Diet Order Diet: Level 5 - Minced and Moist (meds - 1:1 float whole with puree.); Liquid level: Level 0 - Thin; Tray Modifications (optional): No Straws, SLP - 1:1 Supervision by Nursing, SLP - Deliver to Nursing Station     Standing Status:   Standing     Number of Occurrences:   1     Order Specific Question:   Diet:     Answer:   Level 5 - Minced and Moist [24]     Comments:   meds - 1:1 float whole with puree.     Order Specific Question:   Liquid level     Answer:   Level 0 - Thin     Order Specific Question:   Tray Modifications (optional)     Answer:   No Straws     Order Specific Question:   Tray Modifications (optional)     Answer:   SLP - 1:1 Supervision  by Nursing     Order Specific Question:   Tray Modifications (optional)     Answer:   SLP - Deliver to Nursing Station       Assessment:  Active Hospital Problems    Diagnosis    • *CVA (cerebral vascular accident) (HCC)    • Crohn disease (HCC)    • Vitamin D deficiency    • Hypotension    • Hyponatremia    • Hepatocellular dysfunction    • Other hyperlipidemia    • Acute systolic CHF (congestive heart failure) (HCC)    • Methamphetamine abuse (HCC)    • Tobacco dependence      This patient is a 60 y.o. female admitted for acute inpatient rehabilitation with CVA (cerebral vascular accident) (HCC).    I led and attended the weekly conference today, and agree with the IDT conference documentation and plan of care as noted below.    Date of conference: 3/3/2021    Goals and barriers: See IDT note.    Biggest barriers: aphasia    Goals in next week: discharge    CM/social support: SO supportive    Anticipated DC date: 3/5    Home health: PT/OT/SLP/RN    Equip: none needed    Follow up: PCP, cardiology, stroke bridge clinic, Dr. Cornelius        Medical Decision Making and Plan:    Left MCA M1 occlusion  S/p tPA  S/p thrombectomy  Dyphagia, improved  Aphasia, expressive more than receptive, improved  Continue full rehab program  PT/OT/SLP, 1 hr each discipline, 5 days per week    Continue apixaban and atorvastatin for secondary stroke prophylaxis    Outpatient follow-up with stroke [facility] clinic and Dr. Cornelius    Anxiety/depression  Consult psychology if needed  Mood currently stable     Meth use  Patient denies, but +UDS     Tobacco use  Will need to provide counseling to patient and SO     Crohn's disease  Unclear if she has pain/diarrhea/constipation  Did have recent ED visit for GI complaints, left AMA  No current issues with her bowel     Pleural effusions  Discontinued Lasix due to hypotension  Chest x-ray with small left pleural effusion     Portal hypertension  Ascites  Likely from cirrhosis/hepatocellular  dysfunction based upon scan  Monitor for fluid overload     Thyroid nodules  Normal TSH  Outpatient monitoring     Renal mass  Stable  Outpatient monitoring     Acute systolic CHF, EF 25%  Cardiomyopathy  Suspect from drug use  Lasix discontinued due to hypotension  Coreg  Hospitalist consult, appreciate assistance  Lisinopril discontinued due to hypotension     Mitral stenosis  Monitor     Hyperlipidemia  Statin     Normocytic anemia  Mild, monitor    Hyponatremia  Discontinued Lasix    Vitamin D deficiency  Continue supplementation    Bowel program  Continue bowel medications  Scheduled Sennakot  PRN Miralax, MOM, bisacodyl suppository  Last BM 3/2    Bladder program  Check PVRs - 38, 26, 17  Bladder scan for no voids  ICP for over 400 cc  Scheduled toileting    DVT prophylaxis  Eliquis    Total time:  40 minutes.  I spent greater than 50% of the time for patient care, counseling, and coordination on this date, including patient face-to face time, unit/floor time with review of records/pertinent lab data and studies, as well as discussing diagnostic evaluation/work up, planned therapeutic interventions, and future disposition of care, as per the interval events/subjective and the assessment and plan as noted above.          Maryana Flanagan M.D.   Physical Medicine and Rehabilitation

## 2021-03-03 NOTE — THERAPY
Speech Language Pathology  Daily Treatment     Patient Name: Imani Stephen  Age:  60 y.o., Sex:  female  Medical Record #: [id number]  Today's Date: 3/3/2021     Precautions  Precautions: Fall Risk, Swallow Precautions ( See Comments)  Comments: Mildly thickened liquids, Minced & Moist textures; Expressive Aphasia; mild impulsivity    Subjective    Patient pleasant and cooperative.     Objective       03/03/21 1131   Expressive Language   Naming Supervision (5)   Automatic Language Appropriate Supervision (5)   Verbalizes Wants / Needs Minimal (4)   Word Finding Deficits Moderate (3)   Reading Comprehension    Reading Sentences Moderate (3)   Cognition   Orientation  Minimal (4)   Functional Memory Activities Moderate (3)   Dysphagia    Other Treatments Therapeutic trial of (6) soft and bite sized with thin liquids.   Diet / Liquid Recommendation Thin (0);Soft & Bite-Sized (6) - (Dysphagia III)   Nutritional Liquid Intake Rating Scale Non thickened beverages   Nutritional Food Intake Rating Scale Total oral diet with multiple consistencies but requiring special preparation or compensations   Functional Level of Assist   Eating Supervision   Eating Description Modified diet   Comprehension Moderate Assist   Comprehension Description Verbal cues   Expression Moderate Assist   Expression Description Verbal cueing   Social Interaction Supervision   Social Interaction Description Verbal cues   Problem Solving Minimal Assist   Problem Solving Description Bed/chair alarm;Increased time;Seat belt;Supervision;Therapy schedule   Memory Moderate Assist   Memory Description Bed/chair alarm;Increased time;Seat belt;Supervision;Therapy schedule   SLP Total Time Spent   SLP Individual Total Time Spent (Mins) 60   Treatment Charges   SLP Treatment - Individual Speech Language Treatment - Individual   SLP Swallowing Dysfunction Treatment Swallowing Dysfunction Treatment       Assessment    8:01-8:31  Patient assessed with (6) soft  and bite sized and (0) thin liquids at breakfast with no overt s/sx of aspiration.   Patient performed description of function task with 30% acc with mod verbal cues to increase to 75     11:  Patient assessed for 2nd meal with (6) soft and bite sized with no s/sx of aspiration, benefits from min cues to slow rate of intake.  Recommend advance to (6) soft and bite sized and (0) thin liquids.  Patient reports she was able to chew any thing without her dentures ( she doesn't have any_    Plan     Recommend advance to (6) soft and bite sized and (0) thin liquids, meals in t-dine.  Therapeutic trial of regular texture.  Target following 1-2 step directives, verbal expression describing functions or steps in a common activity.    Speech Therapy Problems     Problem: Comprehension STGs     Dates: Start: 02/26/21       Goal: STG-Within one week, patient will     Dates: Start: 02/26/21       Description: 1) Individualized goal:  follow simple 2-step verbal directions with 90% accuracy and minimal cues  2) Interventions:  SLP Speech Language Treatment, SLP Self Care / ADL Training , SLP Cognitive Skill Development, and SLP Group Treatment                    Problem: Expression STGs     Dates: Start: 02/26/21       Goal: STG-Within one week, patient will     Dates: Start: 02/26/21       Description: 1) Individualized goal:  use functional phrases to effectively communicate wants/needs  2) Interventions:  SLP Speech Language Treatment, SLP Self Care / ADL Training , SLP Cognitive Skill Development, and SLP Group Treatment                    Problem: Memory STGs     Dates: Start: 02/26/21       Goal: STG-Within one week, patient will     Dates: Start: 02/26/21       Description: 1) Individualized goal:  use external memory aids (calendar, memory book, white board, etc.) to remain OX4 for 90% of opportunities and minimal cues  2) Interventions:  SLP Speech Language Treatment, SLP Self Care / ADL Training , SLP Cognitive  Skill Development, and SLP Group Treatment                    Problem: Speech/Swallowing LTGs     Dates: Start: 02/26/21       Goal: LTG-By discharge, patient will safely swallow     Dates: Start: 02/26/21       Description: 1) Individualized goal:  least restrictive diet without overt s/sx of asp for 100% of PO intake  2) Interventions:  SLP Swallowing Dysfunction Treatment, SLP Oral Pharyngeal Evaluation, SLP Self Care / ADL Training , and SLP Group Treatment              Goal: LTG-By discharge, patient will     Dates: Start: 02/26/21       Description: 1) Individualized goal: improve cognitive linguistic skills from the severe range to the moderate range as indicated by formal assessment and functional abilities  2) Interventions:  SLP Speech Language Treatment, SLP Self Care / ADL Training , SLP Cognitive Skill Development, and SLP Group Treatment                    Problem: Swallowing STGs     Dates: Start: 02/26/21       Goal: STG-Within one week, patient will     Dates: Start: 02/26/21       Description: 1) Individualized goal:  tolerate minced/moist textures with mildly thick liquids without overt s/sx of sap for 100% of PO intake   2) Interventions:  SLP Swallowing Dysfunction Treatment, SLP Oral Pharyngeal Evaluation, SLP Self Care / ADL Training , and SLP Group Treatment

## 2021-03-03 NOTE — CARE PLAN
Problem: Dressing  Goal: STG-Within one week, patient will dress LB  Description: 1) Individualized Goal:  with SBA   2) Interventions:  OT Self Care/ADL, OT Cognitive Skill Dev, OT Community Reintegration, OT Manual Ther Technique, OT Neuro Re-Ed/Balance, OT Therapeutic Activity, OT Evaluation, and OT Therapeutic Exercise    Outcome: MET  Note: Supervised     Problem: Toileting  Goal: STG-Within one week, patient will complete toileting tasks  Description: 1) Individualized Goal:  with SBA   2) Interventions:  OT Self Care/ADL, OT Cognitive Skill Dev, OT Community Reintegration, OT Manual Ther Technique, OT Neuro Re-Ed/Balance, OT Therapeutic Activity, OT Evaluation, and OT Therapeutic Exercise  Outcome: MET  Note: Independent     Problem: Functional Transfers  Goal: STG-Within one week, patient will transfer to toilet  Description: 1) Individualized Goal:  with SBA   2) Interventions:  OT Self Care/ADL, OT Cognitive Skill Dev, OT Community Reintegration, OT Manual Ther Technique, OT Neuro Re-Ed/Balance, OT Therapeutic Activity, OT Evaluation, and OT Therapeutic Exercise  Outcome: MET  Note: Supervised

## 2021-03-03 NOTE — CARE PLAN
Problem: Mobility  Goal: STG-Within one week, patient will propel wheelchair household distances  Description: 1) Individualized goal: of 150' on flat ground Rashawn and minimal verbal cues for steering.   2) Interventions:  PT Group Therapy, PT Gait Training, PT Self Care/Home Eval, PT Therapeutic Exercises, PT Neuro Re-Ed/Balance, PT Aquatic Therapy, PT Therapeutic Activity, and PT Manual Therapy    Outcome: NOT MET  Goal: STG-Within one week, patient will ambulate household distance  Description: 1) Individualized goal: of 150' CGA, no AD on flat ground to improve ambulation in the home.  2) Interventions:  PT Group Therapy, PT Gait Training, PT Self Care/Home Eval, PT Therapeutic Exercises, PT Neuro Re-Ed/Balance, PT Aquatic Therapy, PT Therapeutic Activity, and PT Manual Therapy  Outcome: MET  Goal: STG-Within one week, patient will ascend and descend four to six stairs  Description: 1) Individualized goal: CGA with 1HR, no AD.  2) Interventions:  PT Group Therapy, PT Gait Training, PT Self Care/Home Eval, PT Therapeutic Exercises, PT Neuro Re-Ed/Balance, PT Aquatic Therapy, PT Therapeutic Activity, and PT Manual Therapy  Outcome: NOT MET     Problem: Mobility Transfers  Goal: STG-Within one week, patient will perform bed mobility  Description: 1) Individualized goal: independently to improve independence in home.   2) Interventions:  PT Group Therapy, PT Gait Training, PT Self Care/Home Eval, PT Therapeutic Exercises, PT Neuro Re-Ed/Balance, PT Aquatic Therapy, PT Therapeutic Activity, and PT Manual Therapy  Outcome: MET

## 2021-03-03 NOTE — CARE PLAN
Problem: Swallowing STGs  Goal: STG-Within one week, patient will  Description: 1) Individualized goal:  tolerate minced/moist textures with mildly thick liquids without overt s/sx of sap for 100% of PO intake   2) Interventions:  SLP Swallowing Dysfunction Treatment, SLP Oral Pharyngeal Evaluation, SLP Self Care / ADL Training , and SLP Group Treatment      Outcome: MET  Note: Exceeded.  Patient safely tolerating (6) soft and bite sized and (0) thin liquids.  She benefits from min verbal cues to slow rate of intake.       Problem: Comprehension STGs  Goal: STG-Within one week, patient will  Description: 1) Individualized goal:  follow simple 2-step verbal directions with 90% accuracy and minimal cues  2) Interventions:  SLP Speech Language Treatment, SLP Self Care / ADL Training , SLP Cognitive Skill Development, and SLP Group Treatment      Outcome: NOT MET  Note: 50-60% acc mod cues needed to improve to 100%     Problem: Expression STGs  Goal: STG-Within one week, patient will  Description: 1) Individualized goal:  use functional phrases to effectively communicate wants/needs  2) Interventions:  SLP Speech Language Treatment, SLP Self Care / ADL Training , SLP Cognitive Skill Development, and SLP Group Treatment      Outcome: MET  Note: Patient is able to make simple common requests in phrases.       Problem: Memory STGs  Goal: STG-Within one week, patient will  Description: 1) Individualized goal:  use external memory aids (calendar, memory book, white board, etc.) to remain OX4 for 90% of opportunities and minimal cues  2) Interventions:  SLP Speech Language Treatment, SLP Self Care / ADL Training , SLP Cognitive Skill Development, and SLP Group Treatment      Outcome: NOT MET  Note: Patient does not initiate use of memory book and has difficulty writing for self.  She is oriented with min A for 90% acc.

## 2021-03-03 NOTE — THERAPY
Occupational Therapy  Daily Treatment     Patient Name: Imani Stephen  Age:  60 y.o., Sex:  female  Medical Record #: [id number]  Today's Date: 3/3/2021     Precautions  Precautions: (P) Fall Risk, Swallow Precautions ( See Comments)  Comments: (P) Mildly thickened liquids, Minced & Moist textures; Expressive Aphasia; mild impulsivity         Subjective    Pt received asleep in bed, easily awoken and agreeable to OT session     Objective       03/03/21 1001   Precautions   Precautions Fall Risk;Swallow Precautions ( See Comments)   Comments Mildly thickened liquids, Minced & Moist textures; Expressive Aphasia; mild impulsivity   Cognition    Comments on NuStep completed word finding activity, timed 1 minute trials naming items in categories (vegetables, fruits, animals, colors, states), pt able to name 4-6 items in each category with min prompting, demos impaired word finding and attention to task, frequently repeated items   Functional Level of Assist   Grooming Supervision   Grooming Description Standing at sink;Verbal cueing  (verbal prompting)   Bathing Supervision   Bathing Description Verbal cueing  (standing and sitting, cues for thoroughness/attention)   Upper Body Dressing Supervision   Upper Body Dressing Description Verbal cueing  (vc's for clothing organization, ind to don shirt)   Lower Body Dressing Supervision   Lower Body Dressing Description Verbal cueing;Increased time  (vc's for clothing organization, ind to don)   Toileting Independent   Toilet Transfers Supervised   Toilet Transfer Description Supervision for safety   Tub / Shower Transfers Supervised   Tub Shower Transfer Description Supervision for safety   Sitting Lower Body Exercises   Nustep Resistance Level 5  (15 min)   OT Total Time Spent   OT Individual Total Time Spent (Mins) 60   OT Charge Group   OT Self Care / ADL 2   OT Cognitive Skill Development First 15 Minutes 1   OT Therapy Activity 1     Pt completed in room mobility  including gathering clothing for showering and organizing dirty clothing into laundry bag after showering with supervision, required increased time and min cues to select needed items for dressing.     Assessment    Pt tolerated session well, continues with improving balance and activity tolerance, completed ADL routine with no AD at supervised level with no overt LOB, required cues primarily for organization and attention particularly during shower for thoroughness, no deficits in sequencing during dressing or grooming though did require verbal prompting to initiate grooming tasks indicating risk for self neglect at home. Continues with aphasia and deficits in functional communication, requires encouragement to continue effort in finding words when stumped.   Strengths: Able to follow instructions, Independent prior level of function, Manages pain appropriately, Motivated for self care and independence, Pleasant and cooperative, Willingly participates in therapeutic activities  Barriers: Aphasia expressive, Decreased endurance, Generalized weakness, Impaired activity tolerance, Impaired balance, Impaired functional cognition    Plan    Cont'd OT for education (environmental/safety awareness, NRG conservation, AE/DME), problem solving, balance, functional strength and endurance for BADL's/IADL's and transfers    Occupational Therapy Goals     Problem: Dressing     Dates: Start: 02/26/21       Goal: STG-Within one week, patient will dress LB     Dates: Start: 02/26/21       Description: 1) Individualized Goal:  with SBA   2) Interventions:  OT Self Care/ADL, OT Cognitive Skill Dev, OT Community Reintegration, OT Manual Ther Technique, OT Neuro Re-Ed/Balance, OT Therapeutic Activity, OT Evaluation, and OT Therapeutic Exercise                  Problem: Functional Transfers     Dates: Start: 02/26/21       Goal: STG-Within one week, patient will transfer to toilet     Dates: Start: 02/26/21       Description: 1)  Individualized Goal:  with SBA   2) Interventions:  OT Self Care/ADL, OT Cognitive Skill Dev, OT Community Reintegration, OT Manual Ther Technique, OT Neuro Re-Ed/Balance, OT Therapeutic Activity, OT Evaluation, and OT Therapeutic Exercise                Problem: OT Long Term Goals     Dates: Start: 02/26/21       Goal: LTG-By discharge, patient will complete basic self care tasks     Dates: Start: 02/26/21       Description: 1) Individualized Goal:  with mod I to supervision level   2) Interventions:  OT Self Care/ADL, OT Cognitive Skill Dev, OT Community Reintegration, OT Manual Ther Technique, OT Neuro Re-Ed/Balance, OT Therapeutic Activity, OT Evaluation, and OT Therapeutic Exercise          Goal: LTG-By discharge, patient will perform bathroom transfers     Dates: Start: 02/26/21       Description: 1) Individualized Goal:  with mod I to supervision level   2) Interventions:  OT Self Care/ADL, OT Cognitive Skill Dev, OT Community Reintegration, OT Manual Ther Technique, OT Neuro Re-Ed/Balance, OT Therapeutic Activity, OT Evaluation, and OT Therapeutic Exercise                Problem: Toileting     Dates: Start: 02/26/21       Goal: STG-Within one week, patient will complete toileting tasks     Dates: Start: 02/26/21       Description: 1) Individualized Goal:  with SBA   2) Interventions:  OT Self Care/ADL, OT Cognitive Skill Dev, OT Community Reintegration, OT Manual Ther Technique, OT Neuro Re-Ed/Balance, OT Therapeutic Activity, OT Evaluation, and OT Therapeutic Exercise

## 2021-03-03 NOTE — CARE PLAN
Problem: Safety  Goal: Will remain free from falls  Outcome: PROGRESSING AS EXPECTED     Problem: Infection  Goal: Will remain free from infection  Outcome: PROGRESSING AS EXPECTED     Problem: Bowel/Gastric:  Goal: Normal bowel function is maintained or improved  Outcome: PROGRESSING AS EXPECTED     Problem: Pain Management  Goal: Pain level will decrease to patient's comfort goal  Outcome: PROGRESSING AS EXPECTED

## 2021-03-03 NOTE — THERAPY
"Physical Therapy   Daily Treatment     Patient Name: Imani Stephen  Age:  60 y.o., Sex:  female  Medical Record #: [id number]  Today's Date: 3/3/2021     Precautions  Precautions: Fall Risk, Swallow Precautions ( See Comments)  Comments: Mildly thickened liquids, Minced & Moist textures; Expressive Aphasia; mild impulsivity    Subjective    Pt received supine in bed, agreeable to tx.      Objective     03/03/21 0831   Vitals   O2 (LPM) 0   O2 Delivery Device None - Room Air   Pain   Non Verbal Scale  Calm   Cognition    Level of Consciousness Alert   Sleep/Wake Cycle   Sleep & Rest Awake   Gait Functional Level of Assist    Gait Level Of Assist Supervised   Assistive Device None   Distance (Feet) 500   # of Times Distance was Traveled 2   Deviation Bradykinetic   Standing Lower Body Exercises   Standing Lower Body Exercises Yes   Hip Abduction   (2 sets of 20, blue band around mid LL)   Step Up   (2 sets of 20, 6\" step)   Bed Mobility    Supine to Sit Independent   Sit to Supine Independent   Sit to Stand Stand by Assist   Scooting Independent   Rolling Independent   Neuro-Muscular Treatments   Neuro-Muscular Treatments Other (See Comments);Weight Shift Left;Weight Shift Right;Anterior weight shift   Comments 1) bounce ball pass while walking x500' SBA 2) bounce ball while walking x500' SBA 3) balanc CGA-SBA on leda disks in // bars no UE suport x1.5 min, needed UE to balance 5x during activity. 4) Bean bag toss in // bars CGA- close SBA 3x3 min with 30 sec standing rest     Interdisciplinary Plan of Care Collaboration   IDT Collaboration with  Physician;Certified Nursing Assistant;Nursing   Patient Position at End of Therapy In Bed;Call Light within Reach;Tray Table within Reach;Phone within Reach   Collaboration Comments JERICHO, Room board updated, and MD order placed: \"Staff, please ambulate with patient using gait belt, SBA indoors or CGA outdoors, and cueing prn. Please do not use a wheelchair at this time. " "Thanks.\"     Pt educated on importance of walking instead of using WC. Pt agreeable to recommendation of removing WC and walking with nurses outside of treatments.     Assessment    Pt demonstrated improved endurance and was able to stand or walk for most of therapy, taking standing rest breaks as needed.   Decreased hip abduction with hip abduction exorcise, requiring verbal cuing to increase hip abduction step length throughout intervention.   Quick reaction time to lunge forward and laterally while maintaining balance during ball pass/ bounce exercise with no LOB.   CN 3,4 and 6 assessed with no abnormalities to pupil response to light/dark, peripheral vision, or tracking.   Strengths: Pleasant and cooperative, Willingly participates in therapeutic activities  Barriers: Aphasia expressive, Decreased endurance, Difficulty following instructions, Fatigue, Hypotension, Impaired activity tolerance, Impaired balance, Impulsive    Plan    Implicate components of DGI to challenge balance with ambulation. Improve LE strength and balance. Strength targeting hip abduction and posterior chain. Progress endurance and activity tolerance.     Physical Therapy Problems     Problem: Mobility     Dates: Start: 02/26/21       Goal: STG-Within one week, patient will propel wheelchair household distances     Dates: Start: 02/26/21       Description: 1) Individualized goal: of 150' on flat ground Rashawn and minimal verbal cues for steering.   2) Interventions:  PT Group Therapy, PT Gait Training, PT Self Care/Home Eval, PT Therapeutic Exercises, PT Neuro Re-Ed/Balance, PT Aquatic Therapy, PT Therapeutic Activity, and PT Manual Therapy            Goal: STG-Within one week, patient will ambulate household distance     Dates: Start: 02/26/21       Description: 1) Individualized goal: of 150' CGA, no AD on flat ground to improve ambulation in the home.  2) Interventions:  PT Group Therapy, PT Gait Training, PT Self Care/Home Eval, PT " Therapeutic Exercises, PT Neuro Re-Ed/Balance, PT Aquatic Therapy, PT Therapeutic Activity, and PT Manual Therapy          Goal: STG-Within one week, patient will ascend and descend four to six stairs     Dates: Start: 02/26/21       Description: 1) Individualized goal: CGA with 1HR, no AD.  2) Interventions:  PT Group Therapy, PT Gait Training, PT Self Care/Home Eval, PT Therapeutic Exercises, PT Neuro Re-Ed/Balance, PT Aquatic Therapy, PT Therapeutic Activity, and PT Manual Therapy                Problem: Mobility Transfers     Dates: Start: 02/26/21       Goal: STG-Within one week, patient will perform bed mobility     Dates: Start: 02/26/21       Description: 1) Individualized goal: independently to improve independence in home.   2) Interventions:  PT Group Therapy, PT Gait Training, PT Self Care/Home Eval, PT Therapeutic Exercises, PT Neuro Re-Ed/Balance, PT Aquatic Therapy, PT Therapeutic Activity, and PT Manual Therapy                Problem: PT-Long Term Goals     Dates: Start: 02/26/21       Goal: LTG-By discharge, patient will propel wheelchair     Dates: Start: 02/26/21       Description: 1) Individualized goal: of 150' on flat ground supervised and minimal verbal cues for steering.   2) Interventions:  PT Group Therapy, PT Gait Training, PT Self Care/Home Eval, PT Therapeutic Exercises, PT Neuro Re-Ed/Balance, PT Aquatic Therapy, PT Therapeutic Activity, and PT Manual Therapy          Goal: LTG-By discharge, patient will ambulate     Dates: Start: 02/26/21       Description: 1) Individualized goal: of 500' CGA on flat ground to improve ambulation in the community.   2) Interventions:  PT Group Therapy, PT Gait Training, PT Self Care/Home Eval, PT Therapeutic Exercises, PT Neuro Re-Ed/Balance, PT Aquatic Therapy, PT Therapeutic Activity, and PT Manual Therapy          Goal: LTG-By discharge, patient will transfer one surface to another     Dates: Start: 02/26/21       Description: 1) Individualized goal:  Supervised to decrease caregiver burden.   2) Interventions:  PT Group Therapy, PT Gait Training, PT Self Care/Home Eval, PT Therapeutic Exercises, PT Neuro Re-Ed/Balance, PT Aquatic Therapy, PT Therapeutic Activity, and PT Manual Therapy          Goal: LTG-By discharge, patient will ambulate up/down flight of stairs     Dates: Start: 02/26/21       Description: 1) Individualized goal: CGA using 1HR to improve pt's mobility in the community.   2) Interventions:  PT Group Therapy, PT Gait Training, PT Self Care/Home Eval, PT Therapeutic Exercises, PT Neuro Re-Ed/Balance, PT Aquatic Therapy, PT Therapeutic Activity, and PT Manual Therapy          Goal: LTG-By discharge, patient will transfer in/out of a car     Dates: Start: 02/26/21       Description: 1) Individualized goal: Supervised with Min verbal cuing to decrease caregiver burden.  2) Interventions:  PT Group Therapy, PT Gait Training, PT Self Care/Home Eval, PT Therapeutic Exercises, PT Neuro Re-Ed/Balance, PT Aquatic Therapy, PT Therapeutic Activity, and PT Manual Therapy

## 2021-03-04 PROBLEM — Z78.9 IMPAIRED MOBILITY AND ADLS: Status: ACTIVE | Noted: 2021-03-04

## 2021-03-04 PROBLEM — Z74.09 IMPAIRED MOBILITY AND ADLS: Status: ACTIVE | Noted: 2021-03-04

## 2021-03-04 LAB
ANION GAP SERPL CALC-SCNC: 8 MMOL/L (ref 7–16)
BUN SERPL-MCNC: 17 MG/DL (ref 8–22)
CALCIUM SERPL-MCNC: 9.1 MG/DL (ref 8.5–10.5)
CHLORIDE SERPL-SCNC: 100 MMOL/L (ref 96–112)
CO2 SERPL-SCNC: 26 MMOL/L (ref 20–33)
CREAT SERPL-MCNC: 0.49 MG/DL (ref 0.5–1.4)
ERYTHROCYTE [DISTWIDTH] IN BLOOD BY AUTOMATED COUNT: 48.5 FL (ref 35.9–50)
GLUCOSE SERPL-MCNC: 92 MG/DL (ref 65–99)
HCT VFR BLD AUTO: 38.7 % (ref 37–47)
HGB BLD-MCNC: 12.6 G/DL (ref 12–16)
MCH RBC QN AUTO: 31 PG (ref 27–33)
MCHC RBC AUTO-ENTMCNC: 32.6 G/DL (ref 33.6–35)
MCV RBC AUTO: 95.1 FL (ref 81.4–97.8)
PLATELET # BLD AUTO: 337 K/UL (ref 164–446)
PMV BLD AUTO: 10.2 FL (ref 9–12.9)
POTASSIUM SERPL-SCNC: 4.4 MMOL/L (ref 3.6–5.5)
RBC # BLD AUTO: 4.07 M/UL (ref 4.2–5.4)
SODIUM SERPL-SCNC: 134 MMOL/L (ref 135–145)
WBC # BLD AUTO: 4.1 K/UL (ref 4.8–10.8)

## 2021-03-04 PROCEDURE — 97130 THER IVNTJ EA ADDL 15 MIN: CPT

## 2021-03-04 PROCEDURE — 770010 HCHG ROOM/CARE - REHAB SEMI PRIVAT*

## 2021-03-04 PROCEDURE — 92526 ORAL FUNCTION THERAPY: CPT

## 2021-03-04 PROCEDURE — 97112 NEUROMUSCULAR REEDUCATION: CPT

## 2021-03-04 PROCEDURE — 36415 COLL VENOUS BLD VENIPUNCTURE: CPT

## 2021-03-04 PROCEDURE — 700102 HCHG RX REV CODE 250 W/ 637 OVERRIDE(OP): Performed by: PHYSICAL MEDICINE & REHABILITATION

## 2021-03-04 PROCEDURE — 97530 THERAPEUTIC ACTIVITIES: CPT

## 2021-03-04 PROCEDURE — 85027 COMPLETE CBC AUTOMATED: CPT

## 2021-03-04 PROCEDURE — 80048 BASIC METABOLIC PNL TOTAL CA: CPT

## 2021-03-04 PROCEDURE — 97110 THERAPEUTIC EXERCISES: CPT

## 2021-03-04 PROCEDURE — A9270 NON-COVERED ITEM OR SERVICE: HCPCS | Performed by: PHYSICAL MEDICINE & REHABILITATION

## 2021-03-04 PROCEDURE — 97129 THER IVNTJ 1ST 15 MIN: CPT

## 2021-03-04 PROCEDURE — 97116 GAIT TRAINING THERAPY: CPT

## 2021-03-04 PROCEDURE — 99231 SBSQ HOSP IP/OBS SF/LOW 25: CPT | Performed by: PHYSICAL MEDICINE & REHABILITATION

## 2021-03-04 PROCEDURE — 97535 SELF CARE MNGMENT TRAINING: CPT

## 2021-03-04 PROCEDURE — 99232 SBSQ HOSP IP/OBS MODERATE 35: CPT | Performed by: HOSPITALIST

## 2021-03-04 RX ORDER — ATORVASTATIN CALCIUM 80 MG/1
80 TABLET, FILM COATED ORAL EVERY EVENING
Qty: 30 TABLET | Refills: 2 | Status: SHIPPED | OUTPATIENT
Start: 2021-03-04 | End: 2021-03-05 | Stop reason: SDUPTHER

## 2021-03-04 RX ORDER — ERGOCALCIFEROL 1.25 MG/1
50000 CAPSULE ORAL
Qty: 4 CAPSULE | Refills: 0 | Status: SHIPPED | OUTPATIENT
Start: 2021-03-04 | End: 2021-03-05 | Stop reason: SDUPTHER

## 2021-03-04 RX ADMIN — APIXABAN 5 MG: 5 TABLET, FILM COATED ORAL at 19:54

## 2021-03-04 RX ADMIN — ATORVASTATIN CALCIUM 80 MG: 40 TABLET, FILM COATED ORAL at 19:53

## 2021-03-04 RX ADMIN — CARVEDILOL 3.12 MG: 3.12 TABLET, FILM COATED ORAL at 08:35

## 2021-03-04 RX ADMIN — TRAZODONE HYDROCHLORIDE 50 MG: 50 TABLET ORAL at 19:55

## 2021-03-04 RX ADMIN — APIXABAN 5 MG: 5 TABLET, FILM COATED ORAL at 08:34

## 2021-03-04 RX ADMIN — SENNOSIDES AND DOCUSATE SODIUM 2 TABLET: 8.6; 5 TABLET ORAL at 19:53

## 2021-03-04 ASSESSMENT — ENCOUNTER SYMPTOMS
MUSCULOSKELETAL NEGATIVE: 1
VOMITING: 0
SHORTNESS OF BREATH: 0
FEVER: 0
PALPITATIONS: 0
SPEECH CHANGE: 1
ABDOMINAL PAIN: 0
BRUISES/BLEEDS EASILY: 0
COUGH: 0
NAUSEA: 0
EYES NEGATIVE: 1
CHILLS: 0
POLYDIPSIA: 0

## 2021-03-04 ASSESSMENT — GAIT ASSESSMENTS
GAIT LEVEL OF ASSIST: INDEPENDENT
DISTANCE (FEET): 500
DEVIATION: BRADYKINETIC

## 2021-03-04 NOTE — PROGRESS NOTES
Hospital Medicine Daily Progress Note      Chief Complaint:  Hypotension  CHF/Cardiomyopathy  Elevated BNP    Interval History:  No chest pain, shortness of breath, or palpitations.     Review of Systems  Review of Systems   Constitutional: Negative for chills and fever.   HENT: Negative.    Eyes: Negative.    Respiratory: Negative for cough and shortness of breath.    Cardiovascular: Negative for chest pain and palpitations.   Gastrointestinal: Negative for abdominal pain, nausea and vomiting.   Genitourinary: Negative.    Musculoskeletal: Negative.    Skin: Negative for itching and rash.   Neurological: Positive for speech change.   Endo/Heme/Allergies: Negative for polydipsia. Does not bruise/bleed easily.        Physical Exam  Temp:  [36.3 °C (97.4 °F)-36.4 °C (97.6 °F)] 36.4 °C (97.6 °F)  Pulse:  [81-89] 81  Resp:  [18] 18  BP: ()/(62-76) 119/76  SpO2:  [94 %-96 %] 94 %    Physical Exam  Vitals reviewed.   Constitutional:       General: She is not in acute distress.     Appearance: Normal appearance. She is not ill-appearing.   HENT:      Head: Normocephalic and atraumatic.      Right Ear: External ear normal.      Left Ear: External ear normal.      Nose: Nose normal.      Mouth/Throat:      Pharynx: Oropharynx is clear.   Eyes:      General:         Right eye: No discharge.         Left eye: No discharge.      Extraocular Movements: Extraocular movements intact.      Conjunctiva/sclera: Conjunctivae normal.   Neck:      Vascular: No JVD.   Cardiovascular:      Rate and Rhythm: Normal rate and regular rhythm.   Pulmonary:      Effort: No respiratory distress.      Breath sounds: No wheezing.      Comments: Decreased BS   Abdominal:      General: Bowel sounds are normal. There is no distension.      Palpations: Abdomen is soft.      Tenderness: There is no abdominal tenderness.   Musculoskeletal:      Cervical back: Normal range of motion and neck supple.      Right lower leg: No edema.      Left lower  leg: No edema.   Skin:     General: Skin is warm and dry.   Neurological:      Mental Status: She is alert and oriented to person, place, and time.         Fluids    Intake/Output Summary (Last 24 hours) at 3/4/2021 1122  Last data filed at 3/4/2021 0854  Gross per 24 hour   Intake 780 ml   Output --   Net 780 ml       Laboratory  Recent Labs     03/04/21  0618   WBC 4.1*   RBC 4.07*   HEMOGLOBIN 12.6   HEMATOCRIT 38.7   MCV 95.1   MCH 31.0   MCHC 32.6*   RDW 48.5   PLATELETCT 337   MPV 10.2     Recent Labs     03/02/21  0551 03/04/21  0618   SODIUM 133* 134*   POTASSIUM 4.9 4.4   CHLORIDE 99 100   CO2 26 26   GLUCOSE 94 92   BUN 17 17   CREATININE 0.68 0.49*   CALCIUM 9.5 9.1                   Assessment/Plan  * CVA (cerebral vascular accident) (HCC)- (present on admission)  Assessment & Plan  Has aphasia  S/P TPA and Thrombectomy  On Eliquis and Lipitor  Check F/U labs in am     Hepatocellular dysfunction- (present on admission)  Assessment & Plan  Needs outpt GI F/U    Hypotension- (present on admission)  Assessment & Plan  Observe blood pressure trends on Coreg  Discontinued Lisinopril and Lasix for hypotension    Acute systolic CHF (congestive heart failure) ([facility])- (present on admission)  Assessment & Plan  Echo EF 25% w/ mod MS  Cardiology suspects CDM 2/2 Meth abuse  Lisinopril and Lasix discontinued for hypotension  Continue Coreg  Needs Cardiology F/U    Other hyperlipidemia- (present on admission)  Assessment & Plan  On Lipitor    Methamphetamine abuse (HCC)- (present on admission)  Assessment & Plan  Chronic history    Crohn disease (HCC)- (present on admission)  Assessment & Plan  Chronic history    Vitamin D deficiency- (present on admission)  Assessment & Plan  VIt D level 16  On high dose supplementation  Suggest repeat levels prior to discharge and adjust dose accordingly    Full Code

## 2021-03-04 NOTE — CARE PLAN
Problem: PT-Long Term Goals  Goal: LTG-By discharge, patient will ambulate  Description: 1) Individualized goal: of 500' CGA on flat ground to improve ambulation in the community.   2) Interventions:  PT Group Therapy, PT Gait Training, PT Self Care/Home Eval, PT Therapeutic Exercises, PT Neuro Re-Ed/Balance, PT Aquatic Therapy, PT Therapeutic Activity, and PT Manual Therapy  Outcome: MET     Problem: Balance  Goal: STG-Within one week, patient will maintain static standing  Description: 1) Individualized goal: On leda disks SBA for 30 seconds, no UE support or LOB  2) Interventions:  PT Group Therapy, PT Gait Training, PT Self Care/Home Eval, PT Therapeutic Exercises, PT Neuro Re-Ed/Balance, PT Aquatic Therapy, PT Therapeutic Activity, and PT Manual Therapy  Outcome: DISCHARGED-GOAL NOT MET

## 2021-03-04 NOTE — THERAPY
Occupational Therapy  Daily Treatment     Patient Name: Imani Stephen  Age:  60 y.o., Sex:  female  Medical Record #: [id number]  Today's Date: 3/4/2021     Precautions  Precautions: Fall Risk, Swallow Precautions ( See Comments)  Comments: Mildly thickened liquids, Minced & Moist textures; Expressive Aphasia; mild impulsivity         Subjective       Objective       03/04/21 0931   Precautions   Precautions Fall Risk;Swallow Precautions ( See Comments)   Comments Mildly thickened liquids, Minced & Moist textures; Expressive Aphasia; mild impulsivity   Vitals   O2 Delivery Device None - Room Air   Pain   Intervention Declines   Pain 0 - 10 Group   Therapist Pain Assessment 0   Non Verbal Descriptors   Non Verbal Scale  Calm   Cognition    Level of Consciousness Alert   Comments Standing at raised plinth (used as table top) for 24 piece puzzle activity, standing 9 mins w/ no LOB nor request for seated rets break, no noted visual field cut, integration of R hand as a functional assist. Toward end of puzzle pt required less VQ's.  But had difficulty turning pieces for an appropriate fit.  Max VQ's and additional time.   Functional Level of Assist   Grooming Modified Independent;Standing   Grooming Description Increased time   Toileting Independent   Toilet Transfers Independent   IADL Treatments   IADL Treatments Kitchen mobility education   Kitchen Mobility Education Mod Indep kitchen access/mobility w/out device, Mod I for simple meal prep (hot) and clean up w/out device. (scrammbled egges)   Sitting Lower Body Exercises   Nustep Resistance Level 2  (15 mins, .33 miles)   Interdisciplinary Plan of Care Collaboration   IDT Collaboration with  Physical Therapist;Nursing   Patient Position at End of Therapy Seated;Call Light within Reach;Tray Table within Reach;Phone within Reach   Collaboration Comments CLOF   OT Total Time Spent   OT Individual Total Time Spent (Mins) 60   OT Charge Group   OT Self Care / ADL 2   OT  Therapy Activity 1   OT Therapeutic Exercise  1       Assessment    Pt was alert and cooperative w/ tx.  tx emphasis on ADL's/IADL's, cog and TherEx - refer to notes above for details.  Strengths: Able to follow instructions, Independent prior level of function, Manages pain appropriately, Motivated for self care and independence, Pleasant and cooperative, Willingly participates in therapeutic activities  Barriers: Aphasia expressive, Decreased endurance, Generalized weakness, Impaired activity tolerance, Impaired balance, Impaired functional cognition    Plan    Pt to DC home tomorrow.  Mod I w/out device    Occupational Therapy Goals     Problem: OT Long Term Goals     Dates: Start: 02/26/21       Goal: LTG-By discharge, patient will complete basic self care tasks     Dates: Start: 02/26/21       Description: 1) Individualized Goal:  with mod I to supervision level   2) Interventions:  OT Self Care/ADL, OT Cognitive Skill Dev, OT Community Reintegration, OT Manual Ther Technique, OT Neuro Re-Ed/Balance, OT Therapeutic Activity, OT Evaluation, and OT Therapeutic Exercise          Goal: LTG-By discharge, patient will perform bathroom transfers     Dates: Start: 02/26/21       Description: 1) Individualized Goal:  with mod I to supervision level   2) Interventions:  OT Self Care/ADL, OT Cognitive Skill Dev, OT Community Reintegration, OT Manual Ther Technique, OT Neuro Re-Ed/Balance, OT Therapeutic Activity, OT Evaluation, and OT Therapeutic Exercise

## 2021-03-04 NOTE — CARE PLAN
Problem: Safety  Goal: Will remain free from falls  Outcome: PROGRESSING AS EXPECTED  Intervention: Implement fall precautions  Flowsheets (Taken 3/4/2021 0247)  Bed Alarm: Yes - Alarm On  Note: Calls appropriately , needs anticipated and attended . Pt free from fall and attended.      Problem: Pain Management  Goal: Pain level will decrease to patient's comfort goal  Outcome: PROGRESSING AS EXPECTED  Intervention: Educate and implement non-pharmacologic comfort measures. Examples: relaxation, distration, play therapy, activity therapy, massage, etc.  Note: Safety precautions observed , needs anticipated , pt free from fall and injury .

## 2021-03-04 NOTE — THERAPY
Physical Therapy   Daily Treatment     Patient Name: Imani Stephen  Age:  60 y.o., Sex:  female  Medical Record #: [id number]  Today's Date: 3/4/2021     Precautions  Precautions: Fall Risk, Swallow Precautions ( See Comments)  Comments: Mildly thickened liquids, Minced & Moist textures; Expressive Aphasia; mild impulsivity    Subjective    Pt received supine in bed, agreeable to tx. Pt in good spirits about receiving mod. Hardy for ambulating in the hospital.       Objective     03/04/21 0831   Vitals   O2 (LPM) 0   O2 Delivery Device None - Room Air   Pain   Non Verbal Scale  Calm   Cognition    Speech/ Communication Expressive Aphasia   Level of Consciousness Alert   Ability To Follow Commands 2 Step   Attention Impaired   ABS (Agitated Behavior Scale)   Agitated Behavior Scale Performed No   Sleep/Wake Cycle   Sleep & Rest Awake   Gait Functional Level of Assist    Gait Level Of Assist Independent   Assistive Device None   Distance (Feet) 500   # of Times Distance was Traveled 2   Deviation Bradykinetic   Stairs Functional Level of Assist   Level of Assist with Stairs Supervised   # of Stairs Climbed 12   Stairs Description Supervision for safety   Transfer Functional Level of Assist   Bed, Chair, Wheelchair Transfer Independent   Bed Chair Wheelchair Transfer Description Non-hospital bed   Bed Mobility    Supine to Sit Independent   Sit to Supine Independent   Sit to Stand Independent   Scooting Independent   Rolling Independent   Neuro-Muscular Treatments   Neuro-Muscular Treatments Other (See Comments)   Comments 1) obstacle course: SBA two steps in, two steps out of agility ladder for full length x6; Side-stepping over 2 bolserts over 6' x6; walking quickly for 40' x3 2) SBA Janga on foam pad x15 min.    Outcome Measures   Outcome Measures Utilized 5x STS   5x STS (Five Times Sit to Stand Test)   Height of Sitting Surface (inches)   (Standard chair, no arm rests)   5x Sit to STand (seconds) 14   Score  Definition Fall Risk   Interdisciplinary Plan of Care Collaboration   IDT Collaboration with  Nursing   Patient Position at End of Therapy In Bed;Call Light within Reach;Tray Table within Reach;Phone within Reach   Collaboration Comments Trial and agreed to mod ind. in hospital with nursing staff.       Assessment    Pt demonstrated greatly improved score on 5x sit to stand, although still considered a fall risk for CVA diagnosis (>12 seconds). Improved gait speed, stride length, and gait pattern with practice walking fast during obstacle course. Mild gait deviations with stairs with 1ft lateral excursions x2 with descent.   Strengths: Pleasant and cooperative, Willingly participates in therapeutic activities  Barriers: Aphasia expressive, Decreased endurance, Difficulty following instructions, Fatigue, Hypotension, Impaired activity tolerance, Impaired balance, Impulsive    Plan    D/C planned for tomorrow 3/5/2021.     Physical Therapy Problems     Problem: Balance     Dates: Start: 03/03/21       Goal: STG-Within one week, patient will maintain static standing     Dates: Start: 03/03/21       Description: 1) Individualized goal: On leda disks SBA for 30 seconds, no UE support or LOB  2) Interventions:  PT Group Therapy, PT Gait Training, PT Self Care/Home Eval, PT Therapeutic Exercises, PT Neuro Re-Ed/Balance, PT Aquatic Therapy, PT Therapeutic Activity, and PT Manual Therapy                Problem: Mobility     Dates: Start: 02/26/21       Goal: STG-Within one week, patient will propel wheelchair household distances     Dates: Start: 02/26/21       Description: 1) Individualized goal: of 150' on flat ground Rashawn and minimal verbal cues for steering.   2) Interventions:  PT Group Therapy, PT Gait Training, PT Self Care/Home Eval, PT Therapeutic Exercises, PT Neuro Re-Ed/Balance, PT Aquatic Therapy, PT Therapeutic Activity, and PT Manual Therapy      Note:     Goal Note filed on 03/03/21 1402 by Alicja Sarmiento  Student    Not appropriate for CLOF.                   Goal: STG-Within one week, patient will ascend and descend four to six stairs     Dates: Start: 02/26/21       Description: 1) Individualized goal: CGA with 1HR, no AD.  2) Interventions:  PT Group Therapy, PT Gait Training, PT Self Care/Home Eval, PT Therapeutic Exercises, PT Neuro Re-Ed/Balance, PT Aquatic Therapy, PT Therapeutic Activity, and PT Manual Therapy    Note:     Goal Note filed on 03/03/21 1402 by Alicja Sarmiento, Student    Not attempted due to safety.                  Goal: STG-Within one week, patient will ambulate community distances     Dates: Start: 03/03/21       Description: 1) Individualized goal: of 500' SPV on flat ground to improve ambulation in the community.   2) Interventions:  PT Group Therapy, PT Gait Training, PT Self Care/Home Eval, PT Therapeutic Exercises, PT Neuro Re-Ed/Balance, PT Aquatic Therapy, PT Therapeutic Activity, and PT Manual Therapy                  Problem: Mobility Transfers     Dates: Start: 03/03/21       Goal: STG-Within one week, patient will transfer bed to chair     Dates: Start: 03/03/21       Description: 1) Individualized goal: Supervised to decrease caregiver burden.   2) Interventions:  PT Group Therapy, PT Gait Training, PT Self Care/Home Eval, PT Therapeutic Exercises, PT Neuro Re-Ed/Balance, PT Aquatic Therapy, PT Therapeutic Activity, and PT Manual Therapy            Goal: STG-Within one week, patient will transfer in/out of car     Dates: Start: 03/03/21       Description: 1) Individualized goal: Supervised with Min verbal cueing to decrease caregiver burden.  2) Interventions:  PT Group Therapy, PT Gait Training, PT Self Care/Home Eval, PT Therapeutic Exercises, PT Neuro Re-Ed/Balance, PT Aquatic Therapy, PT Therapeutic Activity, and PT Manual Therapy                  Problem: PT-Long Term Goals     Dates: Start: 02/26/21       Goal: LTG-By discharge, patient will propel wheelchair     Dates:  Start: 02/26/21       Description: 1) Individualized goal: of 150' on flat ground supervised and minimal verbal cues for steering.   2) Interventions:  PT Group Therapy, PT Gait Training, PT Self Care/Home Eval, PT Therapeutic Exercises, PT Neuro Re-Ed/Balance, PT Aquatic Therapy, PT Therapeutic Activity, and PT Manual Therapy          Goal: LTG-By discharge, patient will ambulate     Dates: Start: 02/26/21       Description: 1) Individualized goal: of 500' CGA on flat ground to improve ambulation in the community.   2) Interventions:  PT Group Therapy, PT Gait Training, PT Self Care/Home Eval, PT Therapeutic Exercises, PT Neuro Re-Ed/Balance, PT Aquatic Therapy, PT Therapeutic Activity, and PT Manual Therapy          Goal: LTG-By discharge, patient will transfer one surface to another     Dates: Start: 02/26/21       Description: 1) Individualized goal: Supervised to decrease caregiver burden.   2) Interventions:  PT Group Therapy, PT Gait Training, PT Self Care/Home Eval, PT Therapeutic Exercises, PT Neuro Re-Ed/Balance, PT Aquatic Therapy, PT Therapeutic Activity, and PT Manual Therapy          Goal: LTG-By discharge, patient will ambulate up/down flight of stairs     Dates: Start: 02/26/21       Description: 1) Individualized goal: CGA using 1HR to improve pt's mobility in the community.   2) Interventions:  PT Group Therapy, PT Gait Training, PT Self Care/Home Eval, PT Therapeutic Exercises, PT Neuro Re-Ed/Balance, PT Aquatic Therapy, PT Therapeutic Activity, and PT Manual Therapy          Goal: LTG-By discharge, patient will transfer in/out of a car     Dates: Start: 02/26/21       Description: 1) Individualized goal: Supervised with Min verbal cuing to decrease caregiver burden.  2) Interventions:  PT Group Therapy, PT Gait Training, PT Self Care/Home Eval, PT Therapeutic Exercises, PT Neuro Re-Ed/Balance, PT Aquatic Therapy, PT Therapeutic Activity, and PT Manual Therapy

## 2021-03-04 NOTE — THERAPY
Speech Language Pathology  Daily Treatment     Patient Name: Imani Stephen  Age:  60 y.o., Sex:  female  Medical Record #: [id number]  Today's Date: 3/4/2021     Precautions  Precautions: Fall Risk, Swallow Precautions ( See Comments)  Comments: Mildly thickened liquids, Minced & Moist textures; Expressive Aphasia; mild impulsivity    Subjective    Patient arrived on time to St with assistance.      Objective       03/04/21 1132   Dysphagia    Diet / Liquid Recommendation Thin (0);Soft & Bite-Sized (6) - (Dysphagia III)   Nutritional Liquid Intake Rating Scale Non thickened beverages   Nutritional Food Intake Rating Scale Total oral diet with multiple consistencies without special preparation but with specific food limitations   Outcome Measures   Outcome Measures Utilized SCCAN   SCCAN (Scales of Cognitive and Communicative Ability for Neurorehabilitation)   Oral Expression - Raw Score 14   Oral Expression - Scale Performance Score 74   Orientation - Raw Score 11   Orientation - Scale Performance Score 92   Memory - Raw Score 5   Memory - Scale Performance Score 26   Speech Comprehension - Raw Score 10   Speech Comprehension - Scale Performance Score 77   SLP Total Time Spent   SLP Individual Total Time Spent (Mins) 60   Treatment Charges   SLP Swallowing Dysfunction Treatment Swallowing Dysfunction Treatment   SLP Cognitive Skill Development First 15 Minutes 1   SLP Cognitive Skill Development Additional 15 Minutes 3       Assessment    Patient was assessed with current diet textures. No overt s/sx of aspiration were noted.   Completed final outcome assessement. SCCAN was initiated, but not completed. Unable to obtain raw score.  Improvements were noted in the areas of expression, orientation, memory, and comprehension, however patient continues to demonstrate moderate deficits in these areas.          Plan    Prepare for d/c    Speech Therapy Problems     Problem: Comprehension STGs     Dates: Start: 02/26/21        Goal: STG-Within one week, patient will     Dates: Start: 02/26/21       Description: 1) Individualized goal:  follow simple 2-step verbal directions with 90% accuracy and minimal cues  2) Interventions:  SLP Speech Language Treatment, SLP Self Care / ADL Training , SLP Cognitive Skill Development, and SLP Group Treatment        Note:     Goal Note filed on 03/03/21 1244 by uJany Kolb MS,CCC-SLP    50-60% acc mod cues needed to improve to 100%                        Problem: Expression STGs     Dates: Start: 03/03/21       Goal: STG-Within one week, patient will     Dates: Start: 03/03/21       Description: 1) Individualized goal:  will describe function of common items with 50% acc with mod A to improve to 100% acc.  2) Interventions:  SLP Speech Language Treatment and SLP Group Treatment                    Problem: Memory STGs     Dates: Start: 02/26/21       Goal: STG-Within one week, patient will     Dates: Start: 02/26/21       Description: 1) Individualized goal:  use external memory aids (calendar, memory book, white board, etc.) to remain OX4 for 90% of opportunities and minimal cues  2) Interventions:  SLP Speech Language Treatment, SLP Self Care / ADL Training , SLP Cognitive Skill Development, and SLP Group Treatment        Note:     Goal Note filed on 03/03/21 1244 by Juany Kolb MS,CCC-SLP    Patient does not initiate use of memory book and has difficulty writing for self.  She is oriented with min A for 90% acc.                        Problem: Speech/Swallowing LTGs     Dates: Start: 02/26/21       Goal: LTG-By discharge, patient will safely swallow     Dates: Start: 02/26/21       Description: 1) Individualized goal:  least restrictive diet without overt s/sx of asp for 100% of PO intake  2) Interventions:  SLP Swallowing Dysfunction Treatment, SLP Oral Pharyngeal Evaluation, SLP Self Care / ADL Training , and SLP Group Treatment              Goal: LTG-By discharge, patient will     Dates:  Start: 02/26/21       Description: 1) Individualized goal: improve cognitive linguistic skills from the severe range to the moderate range as indicated by formal assessment and functional abilities  2) Interventions:  SLP Speech Language Treatment, SLP Self Care / ADL Training , SLP Cognitive Skill Development, and SLP Group Treatment                    Problem: Swallowing STGs     Dates: Start: 03/03/21       Goal: STG-Within one week, patient will safely swallow     Dates: Start: 03/03/21       Description: 1) Individualized goal:  therapeutic trials of (7) regular textures and (0) thin liquids with no s/sx of aspiration for 2/2 meals.  2) Interventions:  SLP Swallowing Dysfunction Treatment and SLP Group Treatment

## 2021-03-04 NOTE — DISCHARGE PLANNING
HH referral sent to [facility] per choice form.  Awaiting response.

## 2021-03-04 NOTE — DISCHARGE PLANNING
KIP LM on Charles' VM to update about IDT and DC date of tomorrow.  Also let him know our therapy schedulers will be reaching out to him to set up FT (1/hr ST and 1/2 hr OT).  CM will continue to monitor for DC needs.

## 2021-03-04 NOTE — CARE PLAN
Problem: Mobility  Goal: STG-Within one week, patient will propel wheelchair household distances  Description: 1) Individualized goal: of 150' on flat ground Rashawn and minimal verbal cues for steering.   2) Interventions:  PT Group Therapy, PT Gait Training, PT Self Care/Home Eval, PT Therapeutic Exercises, PT Neuro Re-Ed/Balance, PT Aquatic Therapy, PT Therapeutic Activity, and PT Manual Therapy    Outcome: DISCHARGED-GOAL NOT MET  Goal: STG-Within one week, patient will ascend and descend four to six stairs  Description: 1) Individualized goal: CGA with 1HR, no AD.  2) Interventions:  PT Group Therapy, PT Gait Training, PT Self Care/Home Eval, PT Therapeutic Exercises, PT Neuro Re-Ed/Balance, PT Aquatic Therapy, PT Therapeutic Activity, and PT Manual Therapy  Outcome: MET  Goal: STG-Within one week, patient will ambulate community distances  Description: 1) Individualized goal: of 500' SPV on flat ground to improve ambulation in the community.   2) Interventions:  PT Group Therapy, PT Gait Training, PT Self Care/Home Eval, PT Therapeutic Exercises, PT Neuro Re-Ed/Balance, PT Aquatic Therapy, PT Therapeutic Activity, and PT Manual Therapy    Outcome: MET     Problem: PT-Long Term Goals  Goal: LTG-By discharge, patient will propel wheelchair  Description: 1) Individualized goal: of 150' on flat ground supervised and minimal verbal cues for steering.   2) Interventions:  PT Group Therapy, PT Gait Training, PT Self Care/Home Eval, PT Therapeutic Exercises, PT Neuro Re-Ed/Balance, PT Aquatic Therapy, PT Therapeutic Activity, and PT Manual Therapy  Outcome: DISCHARGED-GOAL NOT MET  Note: Not med due to pt not needing WC due to CLOF.   Goal: LTG-By discharge, patient will transfer one surface to another  Description: 1) Individualized goal: Supervised to decrease caregiver burden.   2) Interventions:  PT Group Therapy, PT Gait Training, PT Self Care/Home Eval, PT Therapeutic Exercises, PT Neuro Re-Ed/Balance, PT Aquatic  Therapy, PT Therapeutic Activity, and PT Manual Therapy  Outcome: MET  Goal: LTG-By discharge, patient will ambulate up/down flight of stairs  Description: 1) Individualized goal: CGA using 1HR to improve pt's mobility in the community.   2) Interventions:  PT Group Therapy, PT Gait Training, PT Self Care/Home Eval, PT Therapeutic Exercises, PT Neuro Re-Ed/Balance, PT Aquatic Therapy, PT Therapeutic Activity, and PT Manual Therapy  Outcome: MET  Goal: LTG-By discharge, patient will transfer in/out of a car  Description: 1) Individualized goal: Supervised with Min verbal cuing to decrease caregiver burden.  2) Interventions:  PT Group Therapy, PT Gait Training, PT Self Care/Home Eval, PT Therapeutic Exercises, PT Neuro Re-Ed/Balance, PT Aquatic Therapy, PT Therapeutic Activity, and PT Manual Therapy  Outcome: MET     Problem: Balance  Goal: STG-Within one week, patient will maintain static standing  Description: 1) Individualized goal: On leda disks SBA for 30 seconds, no UE support or LOB  2) Interventions:  PT Group Therapy, PT Gait Training, PT Self Care/Home Eval, PT Therapeutic Exercises, PT Neuro Re-Ed/Balance, PT Aquatic Therapy, PT Therapeutic Activity, and PT Manual Therapy  Outcome: NOT MET  Note: Not met due to safety     Problem: Mobility Transfers  Goal: STG-Within one week, patient will transfer bed to chair  Description: 1) Individualized goal: Supervised to decrease caregiver burden.   2) Interventions:  PT Group Therapy, PT Gait Training, PT Self Care/Home Eval, PT Therapeutic Exercises, PT Neuro Re-Ed/Balance, PT Aquatic Therapy, PT Therapeutic Activity, and PT Manual Therapy    Outcome: MET  Goal: STG-Within one week, patient will transfer in/out of car  Description: 1) Individualized goal: Supervised with Min verbal cueing to decrease caregiver burden.  2) Interventions:  PT Group Therapy, PT Gait Training, PT Self Care/Home Eval, PT Therapeutic Exercises, PT Neuro Re-Ed/Balance, PT Aquatic  Therapy, PT Therapeutic Activity, and PT Manual Therapy    Outcome: MET

## 2021-03-04 NOTE — PROGRESS NOTES
Hospital Medicine Daily Progress Note      Chief Complaint:  Hypotension  CHF/Cardiomyopathy  Elevated BNP    Interval History:  Blood pressures trending low; pt asymptomatic.  Labs reviewed.     Review of Systems  Review of Systems   Constitutional: Negative for chills and fever.   HENT: Negative.    Eyes: Negative.    Respiratory: Negative for cough and shortness of breath.    Cardiovascular: Negative for chest pain and palpitations.   Gastrointestinal: Negative for abdominal pain, nausea and vomiting.   Genitourinary: Negative.    Musculoskeletal: Negative.    Skin: Negative for itching and rash.   Neurological: Positive for speech change.   Endo/Heme/Allergies: Negative for polydipsia. Does not bruise/bleed easily.        Physical Exam  Temp:  [36.3 °C (97.4 °F)-36.4 °C (97.6 °F)] 36.4 °C (97.6 °F)  Pulse:  [81-89] 81  Resp:  [18] 18  BP: ()/(62-76) 119/76  SpO2:  [94 %-96 %] 94 %    Physical Exam  Vitals reviewed.   Constitutional:       General: She is not in acute distress.     Appearance: Normal appearance. She is not ill-appearing.   HENT:      Head: Normocephalic and atraumatic.      Right Ear: External ear normal.      Left Ear: External ear normal.      Nose: Nose normal.      Mouth/Throat:      Pharynx: Oropharynx is clear.   Eyes:      General:         Right eye: No discharge.         Left eye: No discharge.      Extraocular Movements: Extraocular movements intact.      Conjunctiva/sclera: Conjunctivae normal.   Neck:      Vascular: No JVD.   Cardiovascular:      Rate and Rhythm: Normal rate and regular rhythm.   Pulmonary:      Effort: No respiratory distress.      Breath sounds: No wheezing.      Comments: Decreased BS   Abdominal:      General: Bowel sounds are normal. There is no distension.      Palpations: Abdomen is soft.      Tenderness: There is no abdominal tenderness.   Musculoskeletal:      Cervical back: Normal range of motion and neck supple.      Right lower leg: No edema.       Left lower leg: No edema.   Skin:     General: Skin is warm and dry.   Neurological:      Mental Status: She is alert and oriented to person, place, and time.         Fluids    Intake/Output Summary (Last 24 hours) at 3/4/2021 1126  Last data filed at 3/4/2021 0854  Gross per 24 hour   Intake 780 ml   Output --   Net 780 ml       Laboratory  Recent Labs     03/04/21  0618   WBC 4.1*   RBC 4.07*   HEMOGLOBIN 12.6   HEMATOCRIT 38.7   MCV 95.1   MCH 31.0   MCHC 32.6*   RDW 48.5   PLATELETCT 337   MPV 10.2     Recent Labs     03/02/21  0551 03/04/21  0618   SODIUM 133* 134*   POTASSIUM 4.9 4.4   CHLORIDE 99 100   CO2 26 26   GLUCOSE 94 92   BUN 17 17   CREATININE 0.68 0.49*   CALCIUM 9.5 9.1                   Assessment/Plan  * CVA (cerebral vascular accident) (HCC)- (present on admission)  Assessment & Plan  Has aphasia  S/P TPA and Thrombectomy  On Eliquis and Lipitor    Hepatocellular dysfunction- (present on admission)  Assessment & Plan  Needs outpt GI F/U    Hypotension- (present on admission)  Assessment & Plan  Discontinue Coreg for hypotension    Acute systolic CHF (congestive heart failure) (HCC)- (present on admission)  Assessment & Plan  Echo EF 25% w/ mod MS  Cardiology suspects CDM 2/2 Meth abuse  Discontinue Coreg for hypotension  Needs Cardiology F/U    Methamphetamine abuse (HCC)- (present on admission)  Assessment & Plan  Chronic history    Crohn disease (HCC)- (present on admission)  Assessment & Plan  Chronic history    Vitamin D deficiency- (present on admission)  Assessment & Plan  VIt D level 16  On high dose supplementation  Suggest repeat levels prior to discharge and adjust dose accordingly    Full Code

## 2021-03-04 NOTE — DISCHARGE PLANNING
KIP spoke with patient to update about IDT and tomorrow's DC.   She stated she would like to leave as early as possible and that she would let Joni know-around 10am.  KIP explained that the therapy  will be reaching out to him RE: FT on day of DC.  CM will continue to monitor for DC needs.

## 2021-03-05 ENCOUNTER — APPOINTMENT (OUTPATIENT)
Dept: PAIN MANAGEMENT | Facility: REHABILITATION | Age: 61
DRG: 056 | End: 2021-03-05
Attending: PHYSICAL MEDICINE & REHABILITATION
Payer: COMMERCIAL

## 2021-03-05 VITALS
WEIGHT: 103.9 LBS | TEMPERATURE: 97.5 F | HEIGHT: 62 IN | DIASTOLIC BLOOD PRESSURE: 70 MMHG | SYSTOLIC BLOOD PRESSURE: 105 MMHG | OXYGEN SATURATION: 96 % | HEART RATE: 88 BPM | RESPIRATION RATE: 18 BRPM | BODY MASS INDEX: 19.12 KG/M2

## 2021-03-05 PROCEDURE — 700102 HCHG RX REV CODE 250 W/ 637 OVERRIDE(OP): Performed by: PHYSICAL MEDICINE & REHABILITATION

## 2021-03-05 PROCEDURE — A9270 NON-COVERED ITEM OR SERVICE: HCPCS | Performed by: PHYSICAL MEDICINE & REHABILITATION

## 2021-03-05 PROCEDURE — 99231 SBSQ HOSP IP/OBS SF/LOW 25: CPT | Performed by: HOSPITALIST

## 2021-03-05 PROCEDURE — 99239 HOSP IP/OBS DSCHRG MGMT >30: CPT | Performed by: PHYSICAL MEDICINE & REHABILITATION

## 2021-03-05 RX ORDER — ERGOCALCIFEROL 1.25 MG/1
50000 CAPSULE ORAL
Qty: 4 CAPSULE | Refills: 0 | Status: SHIPPED | OUTPATIENT
Start: 2021-03-05 | End: 2021-04-04

## 2021-03-05 RX ORDER — ATORVASTATIN CALCIUM 80 MG/1
80 TABLET, FILM COATED ORAL EVERY EVENING
Qty: 30 TABLET | Refills: 2 | Status: SHIPPED | OUTPATIENT
Start: 2021-03-05 | End: 2021-06-03

## 2021-03-05 RX ADMIN — APIXABAN 5 MG: 5 TABLET, FILM COATED ORAL at 07:29

## 2021-03-05 ASSESSMENT — ENCOUNTER SYMPTOMS
POLYDIPSIA: 0
NAUSEA: 0
PALPITATIONS: 0
VOMITING: 0
SPEECH CHANGE: 1
EYES NEGATIVE: 1
SHORTNESS OF BREATH: 0
CHILLS: 0
BRUISES/BLEEDS EASILY: 0
MUSCULOSKELETAL NEGATIVE: 1
ABDOMINAL PAIN: 0
COUGH: 0
FEVER: 0

## 2021-03-05 ASSESSMENT — ACTIVITIES OF DAILY LIVING (ADL)
SHOWER_TRANSFER_LEVEL_OF_ASSIST: REQUIRES SUPERVISION WITH SHOWER TRANSFER
TOILETING_LEVEL_OF_ASSIST: ABLE TO COMPLETE TOILETING WITHOUT ASSIST
TOILET_TRANSFER_LEVEL_OF_ASSIST: ABLE TO COMPLETE TOILET TRANSFER WITHOUT ASSIST

## 2021-03-05 NOTE — DISCHARGE PLANNING
Outpatient therapy referral sent to [facility] per choice form.  Awaiting response.

## 2021-03-05 NOTE — DISCHARGE PLANNING
Per Bronwyn at [facility], referral declined due to staffing.  They are at capacity.  [facility] has only nursing, they do not have therapies.  CM notified.

## 2021-03-05 NOTE — PROGRESS NOTES
Hospital Medicine Daily Progress Note      Chief Complaint:  Hypotension  CHF/Cardiomyopathy  Elevated BNP    Interval History:  No 24 hour clinical changes.     Review of Systems  Review of Systems   Constitutional: Negative for chills and fever.   HENT: Negative.    Eyes: Negative.    Respiratory: Negative for cough and shortness of breath.    Cardiovascular: Negative for chest pain and palpitations.   Gastrointestinal: Negative for abdominal pain, nausea and vomiting.   Genitourinary: Negative.    Musculoskeletal: Negative.    Skin: Negative for itching and rash.   Neurological: Positive for speech change.   Endo/Heme/Allergies: Negative for polydipsia. Does not bruise/bleed easily.        Physical Exam  Temp:  [36.4 °C (97.5 °F)-36.6 °C (97.9 °F)] 36.4 °C (97.5 °F)  Pulse:  [83-88] 88  Resp:  [18] 18  BP: (105-106)/(64-70) 105/70  SpO2:  [96 %-98 %] 96 %    Physical Exam  Vitals reviewed.   Constitutional:       General: She is not in acute distress.     Appearance: Normal appearance. She is not ill-appearing.   HENT:      Head: Normocephalic and atraumatic.      Right Ear: External ear normal.      Left Ear: External ear normal.      Nose: Nose normal.      Mouth/Throat:      Pharynx: Oropharynx is clear.   Eyes:      General:         Right eye: No discharge.         Left eye: No discharge.      Extraocular Movements: Extraocular movements intact.      Conjunctiva/sclera: Conjunctivae normal.   Neck:      Vascular: No JVD.   Cardiovascular:      Rate and Rhythm: Normal rate and regular rhythm.   Pulmonary:      Effort: No respiratory distress.      Breath sounds: No wheezing.      Comments: Decreased BS   Abdominal:      General: Bowel sounds are normal. There is no distension.      Palpations: Abdomen is soft.      Tenderness: There is no abdominal tenderness.   Musculoskeletal:      Cervical back: Normal range of motion and neck supple.      Right lower leg: No edema.      Left lower leg: No edema.   Skin:      General: Skin is warm and dry.   Neurological:      Mental Status: She is alert and oriented to person, place, and time.         Fluids    Intake/Output Summary (Last 24 hours) at 3/5/2021 1026  Last data filed at 3/5/2021 1000  Gross per 24 hour   Intake 980 ml   Output --   Net 980 ml       Laboratory  Recent Labs     03/04/21  0618   WBC 4.1*   RBC 4.07*   HEMOGLOBIN 12.6   HEMATOCRIT 38.7   MCV 95.1   MCH 31.0   MCHC 32.6*   RDW 48.5   PLATELETCT 337   MPV 10.2     Recent Labs     03/04/21  0618   SODIUM 134*   POTASSIUM 4.4   CHLORIDE 100   CO2 26   GLUCOSE 92   BUN 17   CREATININE 0.49*   CALCIUM 9.1                   Assessment/Plan  * CVA (cerebral vascular accident) ([facility])- (present on admission)  Assessment & Plan  Has aphasia  S/P TPA and Thrombectomy  On Eliquis and Lipitor    Hepatocellular dysfunction- (present on admission)  Assessment & Plan  Needs outpt GI F/U    Hypotension- (present on admission)  Assessment & Plan  Discontinued Coreg for low blood pressures    Acute systolic CHF (congestive heart failure) ([facility])- (present on admission)  Assessment & Plan  Echo EF 25% w/ mod MS  Cardiology suspects CDM 2/2 Meth abuse  Discontinued Coreg for hypotension  Needs Cardiology F/U    Methamphetamine abuse ([facility])- (present on admission)  Assessment & Plan  Chronic history    Crohn disease (HCC)- (present on admission)  Assessment & Plan  Chronic history    Vitamin D deficiency- (present on admission)  Assessment & Plan  VIt D level 16  On supplementation    Full Code

## 2021-03-05 NOTE — DISCHARGE INSTRUCTIONS
[facility] NURSING DISCHARGE INSTRUCTIONS    Blood Pressure: 106/64  Weight: 47.1 kg (103 lb 14.4 oz)  Nursing recommendations for Imani Stephen at time of discharge are as follows:  Client and Family Member verbalized understanding of all discharge instructions and prescriptions.     Review all your home medications and newly ordered medications with your doctor and/or pharmacist. Follow medication instructions as directed by your doctor and/or pharmacist.    Pain Management:   Discharge Pain Medication Instructions:  Comfort Goal: Sleep Comfortably  Notify your primary care provider if pain is unrelieved with these measures, if the pain is new, or increased in intensity.    Discharge Skin Characteristics: Warm, Dry  Discharge Skin Exam: Clear     Skin / Wound Care Instructions: Please contact your primary care physician for any change in skin integrity. 0    If You Have Surgical Incisions / Wounds:  Monitor surgical site(s) for signs of increased swelling, redness or symptoms of drainage from the site or fever as this could indicate signs and symptoms of infection. If these symptoms are noted, notifiy your primary care provider.      Discharge Safety Instructions: Should Not Be Left Alone In The House     Discharge Safety Concerns: Impaired Judgement, Weakness  The interdisciplinary team has made recommendation that you should not be left alone  in the house due to impaired judgment and weakness  Anti-embolic stockings are not required to increase circulation to the lower extremities.    Discharge Diet: Soft and bite size     Discharge Liquids: Thin  Discharge Bowel Function: Continent  Please contact your primary care physician for any changes in bowel habits.  Discharge Bowel Program:    Discharge Bladder Function: Continent  Discharge Urinary Devices:        Nursing Discharge Plan:   Influenza Vaccine Indication: Not indicated: Previously immunized this influenza season and > 8 years  of age    Case Management Discharge Instructions:   Discharge Location:    Agency Name/Address/Phone:    Home Health:    Outpatient Services:    DME Provider/Phone:    Medical Equipment Ordered:    Prescription Faxed to:        Discharge Medication Instructions:  Below are the medications your physician expects you to take upon discharge:      Hospital Discharge After a Stroke   Being discharged from the hospital after a stroke can feel overwhelming. Many things may be different, and it is normal to feel scared or anxious. Some stroke survivors may be able to return to their homes, and others may need more specialized care on a temporary or permanent basis.  Your stroke care team will work with you to develop a discharge plan that is best for you. Ask questions if you do not understand something. Invite a friend or family member to participate in discharge planning. Understanding and following your discharge plan can help to prevent another stroke or other problems.  Understanding your medicines  After a stroke, your health care provider may prescribe one or more types of medicine. It is important to take medicines exactly as told by your health care provider. Serious harm, such as another stroke, can happen if you are unable to take your medicine exactly as prescribed. Make sure you understand:  · What medicine to take.  · Why you are taking the medicine.  · How and when to take it.  · If it can be taken with your other medicines and herbal supplements.  · Possible side effects.  · When to call your health care provider if you have any side effects.  · How you will get and pay for your medicines. Medical assistance programs may be able to help you pay for prescription medicines if you cannot afford them.  If you are taking an anticoagulant, be sure to take it exactly as told by your health care provider. This type of medicine can increase the risk of bleeding because it works to prevent blood from clotting. You may  need to take certain precautions to prevent bleeding. You should contact your health care provider if you have:  · Bleeding or bruising.  · A fall or other injury to your head.  · Blood in your urine or stool (feces).  Planning for home safety    Take steps to prevent falls, such as installing grab bars or using a shower chair. Ask a friend or family member to get needed things in place before you go home if possible. A therapist can come to your home to make recommendations for safety equipment. Ask your health care provider if you would benefit from this service or from home care.  Getting needed equipment  Ask your health care provider for a list of any medical equipment and supplies you will need at home. These may include items such as:  · Walkers.  · Canes.  · Wheelchairs.  · Hand-strengthening devices.  · Special eating utensils.  Medical equipment can be rented or purchased, depending on your insurance coverage. Check with your insurance company about what is covered.  Keeping follow-up visits  After a stroke, you will need to follow up regularly with a health care provider. You may also need rehabilitation, which can include physical therapy, occupational therapy, or speech-language therapy.  Keeping these appointments is very important to your recovery after a stroke. Be sure to bring your medicine list and discharge papers with you to your appointments. If you need help to keep track of your schedule, use a calendar or appointment reminder.  Preventing another stroke  Having a stroke puts you at risk for another stroke in the future. Ask your health care provider what actions you can take to lower the risk. These may include:  · Increasing how much you exercise.  · Making a healthy eating plan.  · Quitting smoking.  · Managing other health conditions, such as high blood pressure, high cholesterol, or diabetes.  · Limiting alcohol use.  Knowing the warning signs of a stroke    Make sure you understand the  "signs of a stroke. Before you leave the hospital, you will receive information outlining the stroke warning signs. Share these with your friends and family members.  \"BE FAST\" is an easy way to remember the main warning signs of a stroke:  · B - Balance. Signs are dizziness, sudden trouble walking, or loss of balance.  · E - Eyes. Signs are trouble seeing or a sudden change in vision.  · F - Face. Signs are sudden weakness or numbness of the face, or the face or eyelid drooping on one side.  · A - Arms. Signs are weakness or numbness in an arm. This happens suddenly and usually on one side of the body.  · S - Speech. Signs are sudden trouble speaking, slurred speech, or trouble understanding what people say.  · T - Time. Time to call emergency services. Write down what time symptoms started.  Other signs of stroke may include:  · A sudden, severe headache with no known cause.  · Nausea or vomiting.  · Seizure.  These symptoms may represent a serious problem that is an emergency. Do not wait to see if the symptoms will go away. Get medical help right away. Call your local emergency services (911 in the [place] Do not drive yourself to the hospital.  Make note of the time that you had your first symptoms. Your emergency responders or emergency room staff will need to know this information.  Summary  · Being discharged from the hospital after a stroke can feel overwhelming. It is normal to feel scared or anxious.  · Make sure you take medicines exactly as told by your health care provider.  · Know the warning signs of a stroke, and get help right way if you have any of these symptoms. \"BE FAST\" is an easy way to remember the main warning signs of a stroke.  This information is not intended to replace advice given to you by your health care provider. Make sure you discuss any questions you have with your health care provider.  Document Released: 03/23/2018 Document Revised: 12/21/2018 Document Reviewed: " 03/23/2018  Elsevier Patient Education © 2020 [employer]      Physical Therapy Discharge Instructions for Imani Stephen    3/4/2021    Level of Assist Required for Ambulation: Supervision on Stairs, Supervision on Curbs, No Assist on Flat Surfaces  Distance Patient May Ambulate: 1400 feet  Device Recommended for Ambulation: None  Level of Assist Required for Transfers: Requires No Assist  Device Recommended for Transfers: None  Home Exercise Program: None Issued  Prosthesis / Orthosis Recommendation / Location: No Prosthesis  or Orthosis Recommended    Please have supervision when walking outside the home or up/down stairs. Take care of yourself. It was a pleasure working with you.   -Alicja, SPT and Rolly PT    Speech Therapy Discharge Instructions for Imani Stephen    3/4/2021    Diet: Soft & Bite-Sized (6) - (Dysphagia III), Thin (0)    Swallow Strategies: Slow rate of eating.  One bite or sip at a time.    Supervision: Recommend supervision for medical, financial and health care managment.    Cognition / Communication: Allow extra time to respond.  Break multi step or complex  directions into short simple 1 step directions.  Encourage patient to persist if she cant think of the word she intends.  Cue her to visualize it and describe any feature of it, that she can think of .    It has been a pleasure to work with you. -- Juany Kolb M.S. CCC-SLP    Occupational Therapy Discharge Instructions for Imani Stephen    3/5/2021    Level of Assist Required for Eating: Requires Supervision with Eating  Level of Assist Required for Grooming: Able to Complete Grooming without Assist  Level of Assist Required for Dressing: Requires Supervision with Dressing  Level of Assist Required for Toileting: Able to Complete Toileting without Assist  Level of Assist Required for Toilet Transfer: Able to Complete Toilet Transfer without Assist  Level of Assist Required for Bathing: Requires Supervision with  Bathing  Equipment for Bathing: Shower Chair, Grab Bars in Tub / Shower  Level of Assist Required for Shower Transfer: Requires Supervision with Shower Transfer  Equipment for Shower Transfer: Grab Bars in Tub / Shower, Shower Chair  Level of Assist Required for Home Mgmt: Requires Supervision with Home Management  Level of Assist Required for Meal Prep: Requires Supervision with Meal Preparation

## 2021-03-05 NOTE — PROGRESS NOTES
"Rehab Progress Note     Date of Service: 3/4/2021  Chief Complaint: follow up stroke    Interval Events (Subjective)    Patient seen and examined today in her room.  She has been made modified independent in her room.  She is very happy about her planned discharge for tomorrow.  She denies any pain.  She has no new complaints.    Objective:  VITAL SIGNS: /76   Pulse 81   Temp 36.4 °C (97.6 °F) (Oral)   Resp 18   Ht 1.575 m (5' 2\")   Wt 47.1 kg (103 lb 14.4 oz)   SpO2 94%   BMI 19.00 kg/m²   Gen: alert, no apparent distress, thin  Neuro: notable for expressive aphasia asked    Recent Results (from the past 72 hour(s))   Basic Metabolic Panel    Collection Time: 03/02/21  5:51 AM   Result Value Ref Range    Sodium 133 (L) 135 - 145 mmol/L    Potassium 4.9 3.6 - 5.5 mmol/L    Chloride 99 96 - 112 mmol/L    Co2 26 20 - 33 mmol/L    Glucose 94 65 - 99 mg/dL    Bun 17 8 - 22 mg/dL    Creatinine 0.68 0.50 - 1.40 mg/dL    Calcium 9.5 8.5 - 10.5 mg/dL    Anion Gap 8.0 7.0 - 16.0   MAGNESIUM    Collection Time: 03/02/21  5:51 AM   Result Value Ref Range    Magnesium 2.0 1.5 - 2.5 mg/dL   PHOSPHORUS    Collection Time: 03/02/21  5:51 AM   Result Value Ref Range    Phosphorus 3.3 2.5 - 4.5 mg/dL   proBrain Natriuretic Peptide, NT    Collection Time: 03/02/21  5:51 AM   Result Value Ref Range    NT-proBNP 730 (H) 0 - 125 pg/mL   ESTIMATED GFR    Collection Time: 03/02/21  5:51 AM   Result Value Ref Range    GFR If African American >60 >60 mL/min/1.73 m 2    GFR If Non African American >60 >60 mL/min/1.73 m 2   CBC WITHOUT DIFFERENTIAL    Collection Time: 03/04/21  6:18 AM   Result Value Ref Range    WBC 4.1 (L) 4.8 - 10.8 K/uL    RBC 4.07 (L) 4.20 - 5.40 M/uL    Hemoglobin 12.6 12.0 - 16.0 g/dL    Hematocrit 38.7 37.0 - 47.0 %    MCV 95.1 81.4 - 97.8 fL    MCH 31.0 27.0 - 33.0 pg    MCHC 32.6 (L) 33.6 - 35.0 g/dL    RDW 48.5 35.9 - 50.0 fL    Platelet Count 337 164 - 446 K/uL    MPV 10.2 9.0 - 12.9 fL   Basic " Metabolic Panel    Collection Time: 03/04/21  6:18 AM   Result Value Ref Range    Sodium 134 (L) 135 - 145 mmol/L    Potassium 4.4 3.6 - 5.5 mmol/L    Chloride 100 96 - 112 mmol/L    Co2 26 20 - 33 mmol/L    Glucose 92 65 - 99 mg/dL    Bun 17 8 - 22 mg/dL    Creatinine 0.49 (L) 0.50 - 1.40 mg/dL    Calcium 9.1 8.5 - 10.5 mg/dL    Anion Gap 8.0 7.0 - 16.0   ESTIMATED GFR    Collection Time: 03/04/21  6:18 AM   Result Value Ref Range    GFR If African American >60 >60 mL/min/1.73 m 2    GFR If Non African American >60 >60 mL/min/1.73 m 2       Current Facility-Administered Medications   Medication Frequency   • hydrOXYzine HCl (ATARAX) tablet 50 mg Q6HRS PRN   • melatonin tablet 3 mg HS PRN   • Respiratory Therapy Consult Continuous RT   • Pharmacy Consult Request ...Pain Management Review 1 Each PHARMACY TO DOSE   • hydrALAZINE (APRESOLINE) tablet 10 mg Q8HRS PRN   • acetaminophen (Tylenol) tablet 650 mg Q4HRS PRN   • lactulose 20 GM/30ML solution 30 mL QDAY PRN   • docusate sodium (ENEMEEZ) enema 283 mg QDAY PRN   • fleet enema 133 mL QDAY PRN   • artificial tears ophthalmic solution 1 Drop PRN   • benzocaine-menthol (CEPACOL) lozenge 1 Lozenge Q2HRS PRN   • mag hydrox-al hydrox-simeth (MAALOX PLUS ES or MYLANTA DS) suspension 20 mL Q2HRS PRN   • ondansetron (ZOFRAN ODT) dispertab 4 mg 4X/DAY PRN    Or   • ondansetron (ZOFRAN) syringe/vial injection 4 mg 4X/DAY PRN   • traZODone (DESYREL) tablet 50 mg QHS PRN   • sodium chloride (OCEAN) 0.65 % nasal spray 2 Spray PRN   • midazolam (VERSED) 5 mg/mL (1 mL vial) PRN   • senna-docusate (PERICOLACE or SENOKOT S) 8.6-50 MG per tablet 2 tablet BID    And   • polyethylene glycol/lytes (MIRALAX) PACKET 1 Packet QDAY PRN    And   • magnesium hydroxide (MILK OF MAGNESIA) suspension 30 mL QDAY PRN    And   • bisacodyl (DULCOLAX) suppository 10 mg QDAY PRN   • atorvastatin (LIPITOR) tablet 80 mg Q EVENING   • apixaban (ELIQUIS) tablet 5 mg BID   • vitamin D (Ergocalciferol)  (DRISDOL) capsule 50,000 Units Q7 DAYS       Orders Placed This Encounter   Procedures   • Diet Order Diet: Level 6 - Soft and Bite Sized (meds - 1:1 whole with liquid wash.); Liquid level: Level 0 - Thin; Tray Modifications (optional): No Straws, SLP - 1:1 Supervision by Nursing, SLP - Deliver to Nursing Station     Standing Status:   Standing     Number of Occurrences:   1     Order Specific Question:   Diet:     Answer:   Level 6 - Soft and Bite Sized [23]     Comments:   meds - 1:1 whole with liquid wash.     Order Specific Question:   Liquid level     Answer:   Level 0 - Thin     Order Specific Question:   Tray Modifications (optional)     Answer:   No Straws     Order Specific Question:   Tray Modifications (optional)     Answer:   SLP - 1:1 Supervision by Nursing     Order Specific Question:   Tray Modifications (optional)     Answer:   SLP - Deliver to Nursing Station       Assessment:  Active Hospital Problems    Diagnosis    • *CVA (cerebral vascular accident) (HCC)    • Crohn disease (HCC)    • Vitamin D deficiency    • Hypotension    • Hyponatremia    • Hepatocellular dysfunction    • Other hyperlipidemia    • Acute systolic CHF (congestive heart failure) (HCC)    • Methamphetamine abuse (HCC)    • Tobacco dependence      This patient is a 60 y.o. female admitted for acute inpatient rehabilitation with CVA (cerebral vascular accident) (HCC).    I led and attended the weekly conference, and agree with the IDT conference documentation and plan of care as noted below.    Date of conference: 3/3/2021    Goals and barriers: See IDT note.    Biggest barriers: aphasia    Goals in next week: discharge    CM/social support: SO supportive    Anticipated DC date: 3/5    Home health: PT/OT/SLP/RN    Equip: none needed    Follow up: PCP, cardiology, stroke bridge clinic, Dr. Cornelius        Medical Decision Making and Plan:    Left MCA M1 occlusion  S/p tPA  S/p thrombectomy  Dyphagia, improved  Aphasia, expressive more  than receptive, improved  Continue full rehab program  PT/OT/SLP, 1 hr each discipline, 5 days per week    Continue apixaban and atorvastatin for secondary stroke prophylaxis    Outpatient follow-up with stroke Bridge clinic and Dr. Cornelius    Anxiety/depression  Consult psychology if needed  Mood currently stable     Meth use  Patient denies, but +UDS     Tobacco use  Will need to provide counseling to patient and SO     Crohn's disease  Unclear if she has pain/diarrhea/constipation  Did have recent ED visit for GI complaints, left AMA  No current issues with her bowel     Pleural effusions  Discontinued Lasix due to hypotension  Chest x-ray with small left pleural effusion     Portal hypertension  Ascites  Likely from cirrhosis/hepatocellular dysfunction based upon scan  Monitor for fluid overload     Thyroid nodules  Normal TSH  Outpatient monitoring     Renal mass  Stable  Outpatient monitoring     Acute systolic CHF, EF 25%  Cardiomyopathy  Suspect from drug use  Lasix discontinued due to hypotension  Coreg  Hospitalist consult, appreciate assistance  Lisinopril discontinued due to hypotension  Outpatient follow-up with cardiology     Mitral stenosis  Monitor     Hyperlipidemia  Statin     Normocytic anemia  Mild, monitor    Hyponatremia  Discontinued Lasix    Vitamin D deficiency  Continue supplementation    Bowel program  Continue bowel medications  Scheduled Sennakot  PRN Miralax, MOM, bisacodyl suppository  Last BM 3/3    Bladder program  Check PVRs - 38, 26, 17  Bladder scan for no voids  ICP for over 400 cc  Scheduled toileting    DVT prophylaxis  Eliquis    Total time:  15 minutes.  I spent greater than 50% of the time for patient care, counseling, and coordination on this date, including patient face-to face time, unit/floor time with review of records/pertinent lab data and studies, as well as discussing diagnostic evaluation/work up, planned therapeutic interventions, and future disposition of care, as  per the interval events/subjective and the assessment and plan as noted above.    I have performed a physical exam, reviewed and updated ROS, as well as the assessment and plan today 3/4/2021. In review of note from 3/3/2021 there are no new changes except as documented above.            Maryana Flanagan M.D.   Physical Medicine and Rehabilitation

## 2021-03-05 NOTE — CARE PLAN
Problem: Safety  Goal: Will remain free from injury  Outcome: PROGRESSING AS EXPECTED  Note: Pt tranfers mod I in the unit without assistive device. She calls appropriately when in need of assistance.     Problem: Pain Management  Goal: Pain level will decrease to patient's comfort goal  Outcome: PROGRESSING AS EXPECTED  Note: Pt denies pain or discomfort tonight. Sleeps good. Will continue to monitor.

## 2021-03-05 NOTE — DISCHARGE PLANNING
Case Management Discharge Instructions        Discharge Location: Home with Outpatient Services     Agency Name / Address / Phone: [facility]: [phone]   They will call Joni to set up first appointment.     Home Health: Physical Therapist.     Follow-up Information:     Rashad Montes M.D.-Cardiology  [address]  [place] [address]  [phone]  On 3/12/2021  8:30am    Amna Cornelius D.O.-Physical Medicine and Rehab.  [address]  [place] [address] [phone]  On 3/30/2021  9:30am     Jennifer Gillette M.D.-Primary Care  [address]  [place] [address]  [phone]  On 4/8/2021  9:30am       Please call [facility]: [address], [place]  [place]  [address]  at [phone] to set up a follow up appointment.

## 2021-03-05 NOTE — DISCHARGE PLANNING
Case Management Discharge Instructions        Discharge Location: Home with Home Health.     Agency Name / Address / Phone: [facility]: [phone].     Home Health: Registered Nurse, Occupational Therapist, Physical Therapist, Speech Therapist, .        Follow-up Information:     Rashad Montes M.D.-Cardiology  [address]  [place] [address]  [phone]  On 3/12/2021  8:30am     Amna Cornelius D.O.-Physical Medicine and Rehab.  [address]  [place] [address] [phone]  On 3/30/2021  9:30am     Jennifer Gillette M.D.-Primary Care  [address]  [place] [address]  [phone]  On 4/8/2021  9:30am     Please call [facility]: [address], [place]  [place]  [address]  at [phone] to set up a follow up appointment.

## 2021-03-06 NOTE — DISCHARGE SUMMARY
"Admission Date: 2/25/2021    Discharge Date: 3/5/2021    Attending Provider: Maryana Flanagan MD    Admission Diagnosis:   Active Hospital Problems    Diagnosis    • *CVA (cerebral vascular accident) (HCC)    • Crohn disease (HCC)    • Vitamin D deficiency    • Impaired mobility and ADLs    • Hypotension    • Hyponatremia    • Hepatocellular dysfunction    • Other hyperlipidemia    • Acute systolic CHF (congestive heart failure) (HCC)    • Portal hypertension (HCC)    • Other ascites    • Aphasia    • Methamphetamine abuse (HCC)    • Tobacco dependence        Discharge Diagnosis:  Active Hospital Problems    Diagnosis    • *CVA (cerebral vascular accident) (HCC)    • Crohn disease (HCC)    • Vitamin D deficiency    • Impaired mobility and ADLs    • Hypotension    • Hyponatremia    • Hepatocellular dysfunction    • Other hyperlipidemia    • Acute systolic CHF (congestive heart failure) (HCC)    • Portal hypertension (HCC)    • Other ascites    • Aphasia    • Methamphetamine abuse (HCC)    • Tobacco dependence        HPI per H&P:  Per Dr. Christianson's consult \"The patient is a 60 y.o. female with a past medical history of anxiety, depression, Crohn’s disease, meth use, tobacco use;  who presented on 2/20/2021  3:49 PM right hemiplegia and dysphagia, found to have left MCA M1 thrombus s/p TPA and IR mechanical thrombectomy on 2/20. CT with incidental multiple bilateral thyroid nodules and pleural effusions. Hospital course with cardiomyopathy with EF 25%.\"     HgbA1c 5.8, . Of note, patient was seen in the ED on 2/11 with complaints of nausea/emesis/diarrhea, refused COVID test and left AMA. CT abdomen at that time showed portal hypertension, ascites, and extensive solid mass on the lower pole of the right kidney, unchanged in size as compared to previous scans including renal MRIs done in 2014 and 2017.      Patient can only answer yes/no questions due to aphasia, as well as some other one word answers. She denies " any pain. Rest of ROS difficult due to her aphasia     Patient was evaluated by Rehab Medicine physician and Physical Therapy, Occupational Therapy and Speech Therapy and determined to be appropriate for acute inpatient rehab and was transferred to [facility] on 2/25/2021  2:15 PM.    Rehab Hospital Course by Problem List:    Left MCA M1 occlusion  S/p tPA  S/p thrombectomy  Dyphagia, improved  Aphasia, expressive more than receptive, improved     Continue apixaban and atorvastatin for secondary stroke prophylaxis     Outpatient follow-up with stroke [facility] clinic and Dr. Cornelius, referrals made     Anxiety/depression  Consult psychology if needed  Mood currently stable     Meth use  Patient denies, but +UDS  Counseled to discontinue use for her house    Tobacco use  Patient counseled     Crohn's disease  Unclear if she has pain/diarrhea/constipation  Did have recent ED visit for GI complaints, left AMA  No current issues with her bowel     Pleural effusions  Discontinued Lasix due to hypotension  Chest x-ray with small left pleural effusion     Portal hypertension  Ascites  Likely from cirrhosis/hepatocellular dysfunction based upon scan  Monitor for fluid overload     Thyroid nodules  Normal TSH  Outpatient monitoring     Renal mass  Stable  Outpatient monitoring     Acute systolic CHF, EF 25%  Cardiomyopathy  Suspect from drug use  Lasix discontinued due to hypotension  Coreg also discontinued due to hypotension   Lisinopril discontinued due to hypotension  Outpatient follow-up with cardiology     Mitral stenosis  Monitor     Hyperlipidemia  Statin     Normocytic anemia  Mild, monitor     Hyponatremia  Discontinued Lasix     Vitamin D deficiency  Continue supplementation     Bowel program  Continue bowel medications  Scheduled Sennakot  PRN Miralax, MOM, bisacodyl suppository  Last BM 3/3     Bladder program  Check PVRs - 38, 26, 17  Bladder scan for no voids  ICP for over 400 cc  Scheduled  toileting     DVT prophylaxis  Eliquis    Functional Status at Discharge  Eating:  Supervision  Eating Description:  Modified diet  Grooming:  Modified Independent, Standing  Grooming Description:  Increased time  Bathing:  Supervision  Bathing Description:  Verbal cueing(standing and sitting, cues for thoroughness/attention)  Upper Body Dressing:  Supervision  Upper Body Dressing Description:  Verbal cueing(vc's for clothing organization, ind to don shirt)  Lower Body Dressing:  Supervision  Lower Body Dressing Description:  Verbal cueing, Increased time(vc's for clothing organization, ind to don)  Discharge Location : Home  Patient Discharging with Assist of: Family   Level of Supervision Required: Intermittent Supervision  Recommended Services Upon Discharge: Home Health Occupational Therapy  Long Term Goals Met: 0  Long Term Goals Not Met: 2  Reason(s) for Goals Not Met: Supervision level for shower and dressing tasks  Criteria for Termination of Services: Maximum Function Achieved for Inpatient Rehabilitation  Walk:  Independent  Distance Walked:  500  Number of Times Distance Was Traveled:  2  Assistive Device:  None  Gait Deviation:  Bradykinetic  Wheelchair:  (n/a due to ambulation status)  Distance Propelled:  (n/a due to ambulation status)   Wheelchair Description:  (n/a due to ambulation status)  Stairs Supervised  Stairs Description Supervision for safety  Discharge Location: Home  Patient Discharging with Assist of: Spouse / Significant Other  Level of Supervision Required Upon Discharge: Twenty Four Hour Supervision  Recommended Equipment for Discharge: None  Recommeded Services Upon Discharge: Home Health Physical Therapy;Outpatient Physical Therapy  Long Term Goals Met: 4  Long Term Goals Not Met: 1  Reason(s) for Goals Not Met: Not med due to pt not needing WC due to CLOF.   Criteria for Termination of Services: Maximum Function Achieved for Inpatient Rehabilitation  Comprehension:  Moderate  Assist  Comprehension Description:  Verbal cues  Expression:  Moderate Assist  Expression Description:  Verbal cueing  Social Interaction:  Supervision  Social Interaction Description:  Verbal cues  Problem Solving:  Minimal Assist  Problem Solving Description:  Bed/chair alarm, Increased time, Seat belt, Supervision, Therapy schedule  Memory:  Moderate Assist  Memory Description:  Bed/chair alarm, Increased time, Seat belt, Supervision, Therapy schedule  Progress since Admit: Safely swallowing (6) soft and bite sized (0) thin liquids.  Demonstrates ability to name common pics with 100% acc but has moderate difficulty with wordfinding in confrontational function or sequential description tasks.  Patient has difficulty following 2 step directives and needs cuing to break down into single elements.  Discharge Location : Home  Patient Discharging with Assist of: Spouse / Significant Other  Level of Supervision Required: Intermittent Supervision  Recommended Services Upon Discharge: Home Health Speech Therapy  Long Term Goals Met: Patient will safely swallow  least restrictive diet without overt s/sx of asp for 100% of PO intake.  Patient will improve cognitive linguistic skills from the severe range to the moderate range as indicated by formal assessment and functional abilities  Reason(s) for Goals Not Met: patient is edentulous does not own dentures.  Criteria for Termination of Services: Maximum Function Achieved for Inpatient Rehabilitation    IMaryana M.D., personally performed a complete drug regimen review and no potential clinically significant medication issues were identified.     Discharge Medication:     Medication List      CONTINUE taking these medications      Instructions   apixaban 5mg Tabs  Commonly known as: ELIQUIS   Doctor's comments: Indications: Thromboembolism secondary to Atrial Fibrillation  Take 1 tablet by mouth 2 Times a Day for 90 days.  Dose: 5 mg     atorvastatin 80 MG  tablet  Commonly known as: LIPITOR   Take 1 tablet by mouth every evening for 90 days.  Dose: 80 mg     vitamin D (Ergocalciferol) 1.25 MG (24372 UT) Caps capsule  Commonly known as: DRISDOL   Take 1 capsule by mouth every 7 days for 30 days.  Dose: 50,000 Units        STOP taking these medications    carvedilol 3.125 MG Tabs  Commonly known as: COREG     cyclobenzaprine 10 mg Tabs  Commonly known as: Flexeril     furosemide 10 MG/ML Soln  Commonly known as: LASIX     lisinopril 5 MG Tabs  Commonly known as: PRINIVIL     ondansetron 4 MG Tbdp  Commonly known as: Zofran ODT     potassium chloride SA 20 MEQ Tbcr  Commonly known as: Kdur            Discharge Diet:  Regular    Discharge Activity:  Do not return to work or driving until cleared by a physician.         Disposition:  Patient to discharge home with family support and community resources.     Discharge Location: Home with Outpatient Services     Agency Name / Address / Phone: [facility]: [phone]   They will call Joni to set up first appointment.     Home Health: Physical Therapist.     Follow-up Information:     Rashad Montes M.D.-Cardiology  [address]  [place] [address]  [phone]  On 3/12/2021  8:30am     Amna Cornelius D.O.-Physical Medicine and Rehab.  [address]   [place] [address] [phone]  On 3/30/2021  9:30am     Jennifer Gillette M.D.-Primary Care  [address]   [place] [address]  [phone]  On 4/8/2021  9:30am        Please call [facility]: [address], [place]  [place]  [address]  at [phone] to set up a follow up appointment.    Condition on Discharge:  Good    More than 35 minutes was spent on discharging this patient, including face-to-face time, prescription management, and the dictation of this note.    Maryana Flanagan M.D.    Date of Service: 3/5/2021

## 2021-03-06 NOTE — PROGRESS NOTES
Patient discharged to home per order.  Discharge instructions reviewed with patient; Pt verbalized understanding and signed copies placed in chart.  Patient has all belongings; signed copy of form in chart.  Patient left facility at 1630 accompanied by rehab staff and S.O.  Have enjoyed working with this pleasant patient.

## 2021-03-11 NOTE — DISCHARGE PLANNING
Received notice of denial for 3/4 to 3/5/2021 from [facility]. This will be scanned into the Epic.

## 2021-03-15 DIAGNOSIS — Z23 NEED FOR VACCINATION: ICD-10-CM

## 2021-04-21 ENCOUNTER — APPOINTMENT (OUTPATIENT)
Dept: RADIOLOGY | Facility: MEDICAL CENTER | Age: 61
DRG: 208 | End: 2021-04-21
Attending: INTERNAL MEDICINE
Payer: COMMERCIAL

## 2021-04-21 ENCOUNTER — APPOINTMENT (OUTPATIENT)
Dept: RADIOLOGY | Facility: MEDICAL CENTER | Age: 61
DRG: 208 | End: 2021-04-21
Attending: EMERGENCY MEDICINE
Payer: COMMERCIAL

## 2021-04-21 ENCOUNTER — HOSPITAL ENCOUNTER (INPATIENT)
Facility: MEDICAL CENTER | Age: 61
LOS: 1 days | DRG: 208 | End: 2021-04-22
Attending: EMERGENCY MEDICINE | Admitting: HOSPITALIST
Payer: COMMERCIAL

## 2021-04-21 PROBLEM — E83.51 HYPOCALCEMIA: Status: ACTIVE | Noted: 2021-04-21

## 2021-04-21 PROBLEM — R74.01 TRANSAMINITIS: Status: ACTIVE | Noted: 2021-04-21

## 2021-04-21 PROBLEM — K62.3 RECTAL PROLAPSE: Status: ACTIVE | Noted: 2021-04-21

## 2021-04-21 PROBLEM — N17.9 AKI (ACUTE KIDNEY INJURY) (HCC): Status: ACTIVE | Noted: 2021-04-21

## 2021-04-21 PROBLEM — T68.XXXA HYPOTHERMIA: Status: ACTIVE | Noted: 2021-04-21

## 2021-04-21 PROBLEM — E87.1 HYPONATREMIA: Status: ACTIVE | Noted: 2021-04-21

## 2021-04-21 PROBLEM — R41.82 ALTERED MENTAL STATUS: Status: ACTIVE | Noted: 2021-04-21

## 2021-04-21 PROBLEM — E16.2 HYPOGLYCEMIA: Status: ACTIVE | Noted: 2021-04-21

## 2021-04-21 PROBLEM — S22.069A CLOSED FRACTURE OF SEVENTH THORACIC VERTEBRA (HCC): Status: ACTIVE | Noted: 2021-04-21

## 2021-04-21 PROBLEM — M62.82 NON-TRAUMATIC RHABDOMYOLYSIS: Status: ACTIVE | Noted: 2021-04-21

## 2021-04-21 PROBLEM — E43 SEVERE PROTEIN-CALORIE MALNUTRITION (HCC): Status: ACTIVE | Noted: 2021-04-21

## 2021-04-21 PROBLEM — G93.40 ACUTE ENCEPHALOPATHY: Status: ACTIVE | Noted: 2021-04-21

## 2021-04-21 PROBLEM — R57.9 SHOCK (HCC): Status: ACTIVE | Noted: 2021-04-21

## 2021-04-21 PROBLEM — R74.8 ELEVATED LIVER ENZYMES: Status: ACTIVE | Noted: 2021-04-21

## 2021-04-21 PROBLEM — E87.29 METABOLIC ACIDOSIS, INCREASED ANION GAP: Status: ACTIVE | Noted: 2021-04-21

## 2021-04-21 PROBLEM — K92.2 GI BLEED: Status: ACTIVE | Noted: 2021-04-21

## 2021-04-21 PROBLEM — D69.6 THROMBOCYTOPENIA (HCC): Status: ACTIVE | Noted: 2021-04-21

## 2021-04-21 PROBLEM — J96.01 ACUTE RESPIRATORY FAILURE WITH HYPOXIA (HCC): Status: ACTIVE | Noted: 2021-04-21

## 2021-04-21 LAB
ABO + RH BLD: NORMAL
ABO GROUP BLD: NORMAL
ALBUMIN SERPL BCP-MCNC: 1.5 G/DL (ref 3.2–4.9)
ALBUMIN SERPL BCP-MCNC: 1.8 G/DL (ref 3.2–4.9)
ALBUMIN/GLOB SERPL: 0.8 G/DL
ALBUMIN/GLOB SERPL: 0.9 G/DL
ALP SERPL-CCNC: 377 U/L (ref 30–99)
ALP SERPL-CCNC: 459 U/L (ref 30–99)
ALT SERPL-CCNC: 1713 U/L (ref 2–50)
ALT SERPL-CCNC: 2073 U/L (ref 2–50)
AMMONIA PLAS-SCNC: 76 UMOL/L (ref 11–45)
ANION GAP SERPL CALC-SCNC: 23 MMOL/L (ref 7–16)
ANION GAP SERPL CALC-SCNC: 23 MMOL/L (ref 7–16)
ANION GAP SERPL CALC-SCNC: 27 MMOL/L (ref 7–16)
ANION GAP SERPL CALC-SCNC: 27 MMOL/L (ref 7–16)
APTT PPP: 201.8 SEC (ref 24.7–36)
AST SERPL-CCNC: 2038 U/L (ref 12–45)
AST SERPL-CCNC: 2427 U/L (ref 12–45)
BARCODED ABORH UBTYP: 600
BARCODED ABORH UBTYP: 6200
BARCODED ABORH UBTYP: 6200
BARCODED ABORH UBTYP: 8400
BARCODED PRD CODE UBPRD: NORMAL
BARCODED UNIT NUM UBUNT: NORMAL
BASE EXCESS BLDA CALC-SCNC: -17 MMOL/L (ref -4–3)
BASE EXCESS BLDA CALC-SCNC: -23 MMOL/L (ref -4–3)
BASE EXCESS BLDA CALC-SCNC: -25 MMOL/L (ref -4–3)
BASE EXCESS BLDA CALC-SCNC: -26 MMOL/L (ref -4–3)
BILIRUB SERPL-MCNC: 4.9 MG/DL (ref 0.1–1.5)
BILIRUB SERPL-MCNC: 6.1 MG/DL (ref 0.1–1.5)
BLD GP AB SCN SERPL QL: NORMAL
BODY TEMPERATURE: ABNORMAL DEGREES
BREATHS SETTING VENT: 20
BREATHS SETTING VENT: 20
BREATHS SETTING VENT: 30
BUN SERPL-MCNC: 49 MG/DL (ref 8–22)
BUN SERPL-MCNC: 63 MG/DL (ref 8–22)
BUN SERPL-MCNC: 70 MG/DL (ref 8–22)
BUN SERPL-MCNC: 72 MG/DL (ref 8–22)
CALCIUM SERPL-MCNC: 6.2 MG/DL (ref 8.5–10.5)
CALCIUM SERPL-MCNC: 6.3 MG/DL (ref 8.5–10.5)
CALCIUM SERPL-MCNC: 6.7 MG/DL (ref 8.5–10.5)
CALCIUM SERPL-MCNC: 7 MG/DL (ref 8.5–10.5)
CFT BLD TEG: 35.6 MIN (ref 5–10)
CFT BLD TEG: NORMAL MIN (ref 5–10)
CFT BLD TEG: NORMAL MIN (ref 5–10)
CFT P HPASE BLD TEG: 53.3 MIN (ref 5–10)
CFT P HPASE BLD TEG: NORMAL MIN (ref 5–10)
CHLORIDE SERPL-SCNC: 86 MMOL/L (ref 96–112)
CHLORIDE SERPL-SCNC: 87 MMOL/L (ref 96–112)
CHLORIDE SERPL-SCNC: 91 MMOL/L (ref 96–112)
CHLORIDE SERPL-SCNC: 98 MMOL/L (ref 96–112)
CK SERPL-CCNC: 1730 U/L (ref 0–154)
CK SERPL-CCNC: 1947 U/L (ref 0–154)
CLOT ANGLE BLD TEG: 15.8 DEGREES (ref 53–72)
CLOT ANGLE BLD TEG: NORMAL DEGREES (ref 53–72)
CLOT ANGLE BLD TEG: NORMAL DEGREES (ref 53–72)
CLOT ANGLE P HPASE BLD TEG: 19.7 DEGREES (ref 53–72)
CLOT ANGLE P HPASE BLD TEG: NORMAL DEGREES (ref 53–72)
CLOT INIT P HPASE BLD TEG: 10.8 MIN (ref 1–3)
CLOT INIT P HPASE BLD TEG: NORMAL MIN (ref 1–3)
CLOT LYSIS 30M P MA LENFR BLD TEG: 0 % (ref 0–8)
CLOT LYSIS 30M P MA LENFR BLD TEG: 0 % (ref 0–8)
CLOT LYSIS 30M P MA LENFR BLD TEG: NORMAL % (ref 0–8)
CO2 BLDA-SCNC: 10 MMOL/L (ref 20–33)
CO2 BLDA-SCNC: 10 MMOL/L (ref 20–33)
CO2 BLDA-SCNC: 12 MMOL/L (ref 20–33)
CO2 BLDA-SCNC: <10 MMOL/L (ref 20–33)
CO2 SERPL-SCNC: 5 MMOL/L (ref 20–33)
CO2 SERPL-SCNC: 6 MMOL/L (ref 20–33)
CO2 SERPL-SCNC: 7 MMOL/L (ref 20–33)
CO2 SERPL-SCNC: 8 MMOL/L (ref 20–33)
COMPONENT F 8504F: NORMAL
COMPONENT F 8504F: NORMAL
COMPONENT R 8504R: NORMAL
COMPONENT R 8504R: NORMAL
CREAT SERPL-MCNC: 4.48 MG/DL (ref 0.5–1.4)
CREAT SERPL-MCNC: 4.72 MG/DL (ref 0.5–1.4)
CREAT SERPL-MCNC: 5 MG/DL (ref 0.5–1.4)
CREAT SERPL-MCNC: 5.1 MG/DL (ref 0.5–1.4)
CT.EXTRINSIC BLD ROTEM: 18.6 MIN (ref 1–3)
CT.EXTRINSIC BLD ROTEM: NORMAL MIN (ref 1–3)
CT.EXTRINSIC BLD ROTEM: NORMAL MIN (ref 1–3)
DELSYS IDSYS: ABNORMAL
EKG IMPRESSION: NORMAL
END TIDAL CARBON DIOXIDE IECO2: 16 MMHG
END TIDAL CARBON DIOXIDE IECO2: 16 MMHG
END TIDAL CARBON DIOXIDE IECO2: 17 MMHG
ERYTHROCYTE [DISTWIDTH] IN BLOOD BY AUTOMATED COUNT: 57.1 FL (ref 35.9–50)
ETHANOL BLD-MCNC: <10.1 MG/DL (ref 0–10)
GLOBULIN SER CALC-MCNC: 1.7 G/DL (ref 1.9–3.5)
GLOBULIN SER CALC-MCNC: 2.2 G/DL (ref 1.9–3.5)
GLUCOSE BLD-MCNC: 167 MG/DL (ref 65–99)
GLUCOSE BLD-MCNC: 19 MG/DL (ref 65–99)
GLUCOSE BLD-MCNC: 203 MG/DL (ref 65–99)
GLUCOSE BLD-MCNC: 222 MG/DL (ref 65–99)
GLUCOSE BLD-MCNC: 260 MG/DL (ref 65–99)
GLUCOSE BLD-MCNC: 72 MG/DL (ref 65–99)
GLUCOSE SERPL-MCNC: 137 MG/DL (ref 65–99)
GLUCOSE SERPL-MCNC: 188 MG/DL (ref 65–99)
GLUCOSE SERPL-MCNC: 258 MG/DL (ref 65–99)
GLUCOSE SERPL-MCNC: 73 MG/DL (ref 65–99)
HCG SERPL QL: NEGATIVE
HCO3 BLDA-SCNC: 10.9 MMOL/L (ref 17–25)
HCO3 BLDA-SCNC: 7.1 MMOL/L (ref 17–25)
HCO3 BLDA-SCNC: 8.2 MMOL/L (ref 17–25)
HCO3 BLDA-SCNC: 8.5 MMOL/L (ref 17–25)
HCT VFR BLD AUTO: 35.1 % (ref 37–47)
HGB BLD-MCNC: 10.7 G/DL (ref 12–16)
HGB BLD-MCNC: 7.6 G/DL (ref 12–16)
HGB BLD-MCNC: 9.9 G/DL (ref 12–16)
HOROWITZ INDEX BLDA+IHG-RTO: 108 MM[HG]
HOROWITZ INDEX BLDA+IHG-RTO: 126 MM[HG]
HOROWITZ INDEX BLDA+IHG-RTO: 154 MM[HG]
HOROWITZ INDEX BLDA+IHG-RTO: 82 MM[HG]
INR PPP: >=10 (ref 0.87–1.13)
LACTATE BLD-SCNC: 10.8 MMOL/L (ref 0.5–2)
LACTATE BLD-SCNC: 14.6 MMOL/L (ref 0.5–2)
MAGNESIUM SERPL-MCNC: 2.8 MG/DL (ref 1.5–2.5)
MCF BLD TEG: 49.9 MM (ref 50–70)
MCF BLD TEG: NORMAL MM (ref 50–70)
MCF BLD TEG: NORMAL MM (ref 50–70)
MCF P HPASE BLD TEG: 49.2 MM (ref 50–70)
MCF P HPASE BLD TEG: NORMAL MM (ref 50–70)
MCH RBC QN AUTO: 28.3 PG (ref 27–33)
MCHC RBC AUTO-ENTMCNC: 30.5 G/DL (ref 33.6–35)
MCV RBC AUTO: 92.9 FL (ref 81.4–97.8)
MODE IMODE: ABNORMAL
O2/TOTAL GAS SETTING VFR VENT: 100 %
O2/TOTAL GAS SETTING VFR VENT: 50 %
O2/TOTAL GAS SETTING VFR VENT: 50 %
O2/TOTAL GAS SETTING VFR VENT: 60 %
PA AA BLD-ACNC: 13.6 %
PA AA BLD-ACNC: NORMAL %
PA ADP BLD-ACNC: 60.2 %
PA ADP BLD-ACNC: NORMAL %
PCO2 BLDA: 33.5 MMHG (ref 26–37)
PCO2 BLDA: 38.5 MMHG (ref 26–37)
PCO2 BLDA: 46.1 MMHG (ref 26–37)
PCO2 BLDA: 47 MMHG (ref 26–37)
PCO2 TEMP ADJ BLDA: 28.8 MMHG (ref 26–37)
PCO2 TEMP ADJ BLDA: 30.6 MMHG (ref 26–37)
PCO2 TEMP ADJ BLDA: 34.4 MMHG (ref 26–37)
PEEP END EXPIRATORY PRESSURE IPEEP: 10 CMH20
PEEP END EXPIRATORY PRESSURE IPEEP: 10 CMH20
PEEP END EXPIRATORY PRESSURE IPEEP: 8 CMH20
PH BLDA: 6.79 [PH] (ref 7.4–7.5)
PH BLDA: 6.85 [PH] (ref 7.4–7.5)
PH BLDA: 6.95 [PH] (ref 7.4–7.5)
PH BLDA: 7.12 [PH] (ref 7.4–7.5)
PH TEMP ADJ BLDA: 6.93 [PH] (ref 7.4–7.5)
PH TEMP ADJ BLDA: 7.02 [PH] (ref 7.4–7.5)
PH TEMP ADJ BLDA: 7.17 [PH] (ref 7.4–7.5)
PHOSPHATE SERPL-MCNC: 8 MG/DL (ref 2.5–4.5)
PLATELET # BLD AUTO: 93 K/UL (ref 164–446)
PMV BLD AUTO: 11.5 FL (ref 9–12.9)
PO2 BLDA: 154 MMHG (ref 64–87)
PO2 BLDA: 41 MMHG (ref 64–87)
PO2 BLDA: 63 MMHG (ref 64–87)
PO2 BLDA: 65 MMHG (ref 64–87)
PO2 TEMP ADJ BLDA: 126 MMHG (ref 64–87)
PO2 TEMP ADJ BLDA: 25 MMHG (ref 64–87)
PO2 TEMP ADJ BLDA: 51 MMHG (ref 64–87)
POTASSIUM SERPL-SCNC: 4.3 MMOL/L (ref 3.6–5.5)
POTASSIUM SERPL-SCNC: 4.4 MMOL/L (ref 3.6–5.5)
POTASSIUM SERPL-SCNC: 4.6 MMOL/L (ref 3.6–5.5)
POTASSIUM SERPL-SCNC: 5.5 MMOL/L (ref 3.6–5.5)
PRODUCT TYPE UPROD: NORMAL
PROT SERPL-MCNC: 3.2 G/DL (ref 6–8.2)
PROT SERPL-MCNC: 4 G/DL (ref 6–8.2)
PROTHROMBIN TIME: >120 SEC (ref 12–14.6)
RBC # BLD AUTO: 3.78 M/UL (ref 4.2–5.4)
RH BLD: NORMAL
SAO2 % BLDA: 41 % (ref 93–99)
SAO2 % BLDA: 65 % (ref 93–99)
SAO2 % BLDA: 85 % (ref 93–99)
SAO2 % BLDA: 98 % (ref 93–99)
SARS-COV+SARS-COV-2 AG RESP QL IA.RAPID: NOTDETECTED
SARS-COV-2 RNA RESP QL NAA+PROBE: NOTDETECTED
SODIUM SERPL-SCNC: 119 MMOL/L (ref 135–145)
SODIUM SERPL-SCNC: 119 MMOL/L (ref 135–145)
SODIUM SERPL-SCNC: 122 MMOL/L (ref 135–145)
SODIUM SERPL-SCNC: 128 MMOL/L (ref 135–145)
SPECIMEN DRAWN FROM PATIENT: ABNORMAL
SPECIMEN SOURCE: NORMAL
SPECIMEN SOURCE: NORMAL
TEG ALGORITHM TGALG: ABNORMAL
TEG ALGORITHM TGALG: NORMAL
TEG ALGORITHM TGALG: NORMAL
TIDAL VOLUME IVT: 300 ML
TROPONIN T SERPL-MCNC: 187 NG/L (ref 6–19)
UNIT STATUS USTAT: NORMAL
WBC # BLD AUTO: 7.9 K/UL (ref 4.8–10.8)

## 2021-04-21 PROCEDURE — 87426 SARSCOV CORONAVIRUS AG IA: CPT

## 2021-04-21 PROCEDURE — 84100 ASSAY OF PHOSPHORUS: CPT

## 2021-04-21 PROCEDURE — 84484 ASSAY OF TROPONIN QUANT: CPT

## 2021-04-21 PROCEDURE — 0BH18EZ INSERTION OF ENDOTRACHEAL AIRWAY INTO TRACHEA, VIA NATURAL OR ARTIFICIAL OPENING ENDOSCOPIC: ICD-10-PCS | Performed by: EMERGENCY MEDICINE

## 2021-04-21 PROCEDURE — 82550 ASSAY OF CK (CPK): CPT

## 2021-04-21 PROCEDURE — 94799 UNLISTED PULMONARY SVC/PX: CPT

## 2021-04-21 PROCEDURE — 85347 COAGULATION TIME ACTIVATED: CPT | Mod: 91

## 2021-04-21 PROCEDURE — 94002 VENT MGMT INPAT INIT DAY: CPT

## 2021-04-21 PROCEDURE — 36556 INSERT NON-TUNNEL CV CATH: CPT

## 2021-04-21 PROCEDURE — 80053 COMPREHEN METABOLIC PANEL: CPT

## 2021-04-21 PROCEDURE — 51702 INSERT TEMP BLADDER CATH: CPT

## 2021-04-21 PROCEDURE — 93010 ELECTROCARDIOGRAM REPORT: CPT | Performed by: INTERNAL MEDICINE

## 2021-04-21 PROCEDURE — 99291 CRITICAL CARE FIRST HOUR: CPT

## 2021-04-21 PROCEDURE — 85730 THROMBOPLASTIN TIME PARTIAL: CPT

## 2021-04-21 PROCEDURE — 85576 BLOOD PLATELET AGGREGATION: CPT

## 2021-04-21 PROCEDURE — 76700 US EXAM ABDOM COMPLETE: CPT

## 2021-04-21 PROCEDURE — 87040 BLOOD CULTURE FOR BACTERIA: CPT

## 2021-04-21 PROCEDURE — 76705 ECHO EXAM OF ABDOMEN: CPT

## 2021-04-21 PROCEDURE — 30233L1 TRANSFUSION OF NONAUTOLOGOUS FRESH PLASMA INTO PERIPHERAL VEIN, PERCUTANEOUS APPROACH: ICD-10-PCS | Performed by: INTERNAL MEDICINE

## 2021-04-21 PROCEDURE — 31500 INSERT EMERGENCY AIRWAY: CPT

## 2021-04-21 PROCEDURE — 36600 WITHDRAWAL OF ARTERIAL BLOOD: CPT

## 2021-04-21 PROCEDURE — 86923 COMPATIBILITY TEST ELECTRIC: CPT

## 2021-04-21 PROCEDURE — 99291 CRITICAL CARE FIRST HOUR: CPT | Performed by: HOSPITALIST

## 2021-04-21 PROCEDURE — 83735 ASSAY OF MAGNESIUM: CPT

## 2021-04-21 PROCEDURE — 80048 BASIC METABOLIC PNL TOTAL CA: CPT

## 2021-04-21 PROCEDURE — C1751 CATH, INF, PER/CENT/MIDLINE: HCPCS

## 2021-04-21 PROCEDURE — 96375 TX/PRO/DX INJ NEW DRUG ADDON: CPT

## 2021-04-21 PROCEDURE — 70450 CT HEAD/BRAIN W/O DYE: CPT

## 2021-04-21 PROCEDURE — P9016 RBC LEUKOCYTES REDUCED: HCPCS

## 2021-04-21 PROCEDURE — 84703 CHORIONIC GONADOTROPIN ASSAY: CPT

## 2021-04-21 PROCEDURE — 82962 GLUCOSE BLOOD TEST: CPT

## 2021-04-21 PROCEDURE — 700101 HCHG RX REV CODE 250

## 2021-04-21 PROCEDURE — 86900 BLOOD TYPING SEROLOGIC ABO: CPT

## 2021-04-21 PROCEDURE — 770022 HCHG ROOM/CARE - ICU (200)

## 2021-04-21 PROCEDURE — 86850 RBC ANTIBODY SCREEN: CPT

## 2021-04-21 PROCEDURE — 71045 X-RAY EXAM CHEST 1 VIEW: CPT

## 2021-04-21 PROCEDURE — 700105 HCHG RX REV CODE 258: Performed by: EMERGENCY MEDICINE

## 2021-04-21 PROCEDURE — 93005 ELECTROCARDIOGRAM TRACING: CPT | Performed by: EMERGENCY MEDICINE

## 2021-04-21 PROCEDURE — 85018 HEMOGLOBIN: CPT

## 2021-04-21 PROCEDURE — 83605 ASSAY OF LACTIC ACID: CPT

## 2021-04-21 PROCEDURE — 85610 PROTHROMBIN TIME: CPT

## 2021-04-21 PROCEDURE — 85027 COMPLETE CBC AUTOMATED: CPT

## 2021-04-21 PROCEDURE — 96365 THER/PROPH/DIAG IV INF INIT: CPT

## 2021-04-21 PROCEDURE — 700101 HCHG RX REV CODE 250: Performed by: HOSPITALIST

## 2021-04-21 PROCEDURE — 80307 DRUG TEST PRSMV CHEM ANLYZR: CPT

## 2021-04-21 PROCEDURE — P9017 PLASMA 1 DONOR FRZ W/IN 8 HR: HCPCS

## 2021-04-21 PROCEDURE — 36415 COLL VENOUS BLD VENIPUNCTURE: CPT

## 2021-04-21 PROCEDURE — 99292 CRITICAL CARE ADDL 30 MIN: CPT | Performed by: INTERNAL MEDICINE

## 2021-04-21 PROCEDURE — 700105 HCHG RX REV CODE 258: Performed by: INTERNAL MEDICINE

## 2021-04-21 PROCEDURE — 700117 HCHG RX CONTRAST REV CODE 255: Performed by: EMERGENCY MEDICINE

## 2021-04-21 PROCEDURE — 82140 ASSAY OF AMMONIA: CPT

## 2021-04-21 PROCEDURE — 302214 INTUBATION BOX: Performed by: HOSPITALIST

## 2021-04-21 PROCEDURE — 700101 HCHG RX REV CODE 250: Performed by: EMERGENCY MEDICINE

## 2021-04-21 PROCEDURE — 700111 HCHG RX REV CODE 636 W/ 250 OVERRIDE (IP): Performed by: INTERNAL MEDICINE

## 2021-04-21 PROCEDURE — 72170 X-RAY EXAM OF PELVIS: CPT

## 2021-04-21 PROCEDURE — 70486 CT MAXILLOFACIAL W/O DYE: CPT

## 2021-04-21 PROCEDURE — 72131 CT LUMBAR SPINE W/O DYE: CPT

## 2021-04-21 PROCEDURE — 700101 HCHG RX REV CODE 250: Performed by: INTERNAL MEDICINE

## 2021-04-21 PROCEDURE — 700111 HCHG RX REV CODE 636 W/ 250 OVERRIDE (IP): Performed by: EMERGENCY MEDICINE

## 2021-04-21 PROCEDURE — 87077 CULTURE AEROBIC IDENTIFY: CPT

## 2021-04-21 PROCEDURE — G0390 TRAUMA RESPONS W/HOSP CRITI: HCPCS

## 2021-04-21 PROCEDURE — 700105 HCHG RX REV CODE 258: Performed by: HOSPITALIST

## 2021-04-21 PROCEDURE — C9803 HOPD COVID-19 SPEC COLLECT: HCPCS | Performed by: EMERGENCY MEDICINE

## 2021-04-21 PROCEDURE — 5A1935Z RESPIRATORY VENTILATION, LESS THAN 24 CONSECUTIVE HOURS: ICD-10-PCS | Performed by: EMERGENCY MEDICINE

## 2021-04-21 PROCEDURE — 85384 FIBRINOGEN ACTIVITY: CPT | Mod: 91

## 2021-04-21 PROCEDURE — 82803 BLOOD GASES ANY COMBINATION: CPT

## 2021-04-21 PROCEDURE — 99291 CRITICAL CARE FIRST HOUR: CPT | Performed by: INTERNAL MEDICINE

## 2021-04-21 PROCEDURE — 74175 CTA ABDOMEN W/CONTRAST: CPT

## 2021-04-21 PROCEDURE — 36430 TRANSFUSION BLD/BLD COMPNT: CPT

## 2021-04-21 PROCEDURE — U0003 INFECTIOUS AGENT DETECTION BY NUCLEIC ACID (DNA OR RNA); SEVERE ACUTE RESPIRATORY SYNDROME CORONAVIRUS 2 (SARS-COV-2) (CORONAVIRUS DISEASE [COVID-19]), AMPLIFIED PROBE TECHNIQUE, MAKING USE OF HIGH THROUGHPUT TECHNOLOGIES AS DESCRIBED BY CMS-2020-01-R: HCPCS

## 2021-04-21 PROCEDURE — 02HV33Z INSERTION OF INFUSION DEVICE INTO SUPERIOR VENA CAVA, PERCUTANEOUS APPROACH: ICD-10-PCS | Performed by: EMERGENCY MEDICINE

## 2021-04-21 PROCEDURE — U0005 INFEC AGEN DETEC AMPLI PROBE: HCPCS

## 2021-04-21 PROCEDURE — 82077 ASSAY SPEC XCP UR&BREATH IA: CPT

## 2021-04-21 PROCEDURE — 72125 CT NECK SPINE W/O DYE: CPT

## 2021-04-21 PROCEDURE — 96367 TX/PROPH/DG ADDL SEQ IV INF: CPT

## 2021-04-21 PROCEDURE — 700111 HCHG RX REV CODE 636 W/ 250 OVERRIDE (IP): Performed by: HOSPITALIST

## 2021-04-21 PROCEDURE — 30233N1 TRANSFUSION OF NONAUTOLOGOUS RED BLOOD CELLS INTO PERIPHERAL VEIN, PERCUTANEOUS APPROACH: ICD-10-PCS | Performed by: INTERNAL MEDICINE

## 2021-04-21 PROCEDURE — C9113 INJ PANTOPRAZOLE SODIUM, VIA: HCPCS | Performed by: INTERNAL MEDICINE

## 2021-04-21 PROCEDURE — 304538 HCHG NG TUBE

## 2021-04-21 PROCEDURE — C9132 KCENTRA, PER I.U.: HCPCS | Performed by: EMERGENCY MEDICINE

## 2021-04-21 PROCEDURE — 303105 HCHG CATHETER EXTRA

## 2021-04-21 PROCEDURE — 72128 CT CHEST SPINE W/O DYE: CPT

## 2021-04-21 PROCEDURE — 86901 BLOOD TYPING SEROLOGIC RH(D): CPT

## 2021-04-21 PROCEDURE — 96376 TX/PRO/DX INJ SAME DRUG ADON: CPT

## 2021-04-21 PROCEDURE — 96368 THER/DIAG CONCURRENT INF: CPT

## 2021-04-21 RX ORDER — KETAMINE HYDROCHLORIDE 50 MG/ML
INJECTION, SOLUTION INTRAMUSCULAR; INTRAVENOUS
Status: DISPENSED
Start: 2021-04-21 | End: 2021-04-21

## 2021-04-21 RX ORDER — DEXTROSE MONOHYDRATE 50 MG/ML
INJECTION, SOLUTION INTRAVENOUS ONCE
Status: COMPLETED | OUTPATIENT
Start: 2021-04-21 | End: 2021-04-21

## 2021-04-21 RX ORDER — PHENYLEPHRINE HCL IN 0.9% NACL 0.5 MG/5ML
SYRINGE (ML) INTRAVENOUS
Status: COMPLETED | OUTPATIENT
Start: 2021-04-21 | End: 2021-04-21

## 2021-04-21 RX ORDER — ROCURONIUM BROMIDE 10 MG/ML
INJECTION, SOLUTION INTRAVENOUS
Status: COMPLETED | OUTPATIENT
Start: 2021-04-21 | End: 2021-04-21

## 2021-04-21 RX ORDER — BISACODYL 10 MG
10 SUPPOSITORY, RECTAL RECTAL
Status: DISCONTINUED | OUTPATIENT
Start: 2021-04-21 | End: 2021-04-22 | Stop reason: HOSPADM

## 2021-04-21 RX ORDER — CALCIUM CHLORIDE 100 MG/ML
1 INJECTION INTRAVENOUS; INTRAVENTRICULAR ONCE
Status: COMPLETED | OUTPATIENT
Start: 2021-04-21 | End: 2021-04-21

## 2021-04-21 RX ORDER — ERGOCALCIFEROL 1.25 MG/1
50000 CAPSULE ORAL
COMMUNITY

## 2021-04-21 RX ORDER — PHENYLEPHRINE HCL IN 0.9% NACL 0.5 MG/5ML
100 SYRINGE (ML) INTRAVENOUS
Status: ACTIVE | OUTPATIENT
Start: 2021-04-21 | End: 2021-04-21

## 2021-04-21 RX ORDER — ACETAMINOPHEN 325 MG/1
650 TABLET ORAL EVERY 6 HOURS PRN
Status: DISCONTINUED | OUTPATIENT
Start: 2021-04-21 | End: 2021-04-21

## 2021-04-21 RX ORDER — SODIUM BICARBONATE IN D5W 150/1000ML
PLASTIC BAG, INJECTION (ML) INTRAVENOUS CONTINUOUS
Status: DISCONTINUED | OUTPATIENT
Start: 2021-04-21 | End: 2021-04-22 | Stop reason: HOSPADM

## 2021-04-21 RX ORDER — SODIUM BICARBONATE IN D5W 150/1000ML
PLASTIC BAG, INJECTION (ML) INTRAVENOUS ONCE
Status: DISCONTINUED | OUTPATIENT
Start: 2021-04-21 | End: 2021-04-21

## 2021-04-21 RX ORDER — OCTREOTIDE ACETATE 100 UG/ML
50 INJECTION, SOLUTION INTRAVENOUS; SUBCUTANEOUS ONCE
Status: COMPLETED | OUTPATIENT
Start: 2021-04-21 | End: 2021-04-21

## 2021-04-21 RX ORDER — PROMETHAZINE HYDROCHLORIDE 25 MG/1
12.5-25 SUPPOSITORY RECTAL EVERY 4 HOURS PRN
Status: DISCONTINUED | OUTPATIENT
Start: 2021-04-21 | End: 2021-04-22 | Stop reason: HOSPADM

## 2021-04-21 RX ORDER — PANTOPRAZOLE SODIUM 40 MG/10ML
40 INJECTION, POWDER, LYOPHILIZED, FOR SOLUTION INTRAVENOUS 2 TIMES DAILY
Status: DISCONTINUED | OUTPATIENT
Start: 2021-04-21 | End: 2021-04-21

## 2021-04-21 RX ORDER — ATORVASTATIN CALCIUM 80 MG/1
80 TABLET, FILM COATED ORAL DAILY
COMMUNITY

## 2021-04-21 RX ORDER — KETAMINE HYDROCHLORIDE 50 MG/ML
INJECTION, SOLUTION INTRAMUSCULAR; INTRAVENOUS
Status: COMPLETED | OUTPATIENT
Start: 2021-04-21 | End: 2021-04-21

## 2021-04-21 RX ORDER — AMOXICILLIN 250 MG
2 CAPSULE ORAL 2 TIMES DAILY
Status: DISCONTINUED | OUTPATIENT
Start: 2021-04-21 | End: 2021-04-22 | Stop reason: HOSPADM

## 2021-04-21 RX ORDER — PROCHLORPERAZINE EDISYLATE 5 MG/ML
5-10 INJECTION INTRAMUSCULAR; INTRAVENOUS EVERY 4 HOURS PRN
Status: DISCONTINUED | OUTPATIENT
Start: 2021-04-21 | End: 2021-04-22 | Stop reason: HOSPADM

## 2021-04-21 RX ORDER — IPRATROPIUM BROMIDE AND ALBUTEROL SULFATE 2.5; .5 MG/3ML; MG/3ML
3 SOLUTION RESPIRATORY (INHALATION)
Status: DISCONTINUED | OUTPATIENT
Start: 2021-04-21 | End: 2021-04-22 | Stop reason: HOSPADM

## 2021-04-21 RX ORDER — POLYETHYLENE GLYCOL 3350 17 G/17G
1 POWDER, FOR SOLUTION ORAL
Status: DISCONTINUED | OUTPATIENT
Start: 2021-04-21 | End: 2021-04-22 | Stop reason: HOSPADM

## 2021-04-21 RX ORDER — DEXMEDETOMIDINE HYDROCHLORIDE 4 UG/ML
0-.7 INJECTION, SOLUTION INTRAVENOUS CONTINUOUS
Status: DISCONTINUED | OUTPATIENT
Start: 2021-04-21 | End: 2021-04-22 | Stop reason: HOSPADM

## 2021-04-21 RX ORDER — SODIUM CHLORIDE, SODIUM LACTATE, POTASSIUM CHLORIDE, AND CALCIUM CHLORIDE .6; .31; .03; .02 G/100ML; G/100ML; G/100ML; G/100ML
30 INJECTION, SOLUTION INTRAVENOUS ONCE
Status: COMPLETED | OUTPATIENT
Start: 2021-04-21 | End: 2021-04-21

## 2021-04-21 RX ORDER — SODIUM CHLORIDE 9 MG/ML
INJECTION, SOLUTION INTRAVENOUS CONTINUOUS
Status: ACTIVE | OUTPATIENT
Start: 2021-04-21 | End: 2021-04-22

## 2021-04-21 RX ORDER — NOREPINEPHRINE BITARTRATE 0.03 MG/ML
INJECTION, SOLUTION INTRAVENOUS
Status: COMPLETED
Start: 2021-04-21 | End: 2021-04-21

## 2021-04-21 RX ORDER — PROMETHAZINE HYDROCHLORIDE 25 MG/1
12.5-25 TABLET ORAL EVERY 4 HOURS PRN
Status: DISCONTINUED | OUTPATIENT
Start: 2021-04-21 | End: 2021-04-22 | Stop reason: HOSPADM

## 2021-04-21 RX ORDER — ONDANSETRON 2 MG/ML
4 INJECTION INTRAMUSCULAR; INTRAVENOUS EVERY 4 HOURS PRN
Status: DISCONTINUED | OUTPATIENT
Start: 2021-04-21 | End: 2021-04-22 | Stop reason: HOSPADM

## 2021-04-21 RX ORDER — ONDANSETRON 4 MG/1
4 TABLET, ORALLY DISINTEGRATING ORAL EVERY 4 HOURS PRN
Status: DISCONTINUED | OUTPATIENT
Start: 2021-04-21 | End: 2021-04-22 | Stop reason: HOSPADM

## 2021-04-21 RX ORDER — NOREPINEPHRINE BITARTRATE 0.03 MG/ML
0-50 INJECTION, SOLUTION INTRAVENOUS CONTINUOUS
Status: DISCONTINUED | OUTPATIENT
Start: 2021-04-21 | End: 2021-04-22 | Stop reason: HOSPADM

## 2021-04-21 RX ORDER — FAMOTIDINE 20 MG/1
20 TABLET, FILM COATED ORAL EVERY 12 HOURS
Status: DISCONTINUED | OUTPATIENT
Start: 2021-04-21 | End: 2021-04-21

## 2021-04-21 RX ORDER — SODIUM CHLORIDE, SODIUM LACTATE, POTASSIUM CHLORIDE, CALCIUM CHLORIDE 600; 310; 30; 20 MG/100ML; MG/100ML; MG/100ML; MG/100ML
INJECTION, SOLUTION INTRAVENOUS CONTINUOUS
Status: DISCONTINUED | OUTPATIENT
Start: 2021-04-21 | End: 2021-04-21

## 2021-04-21 RX ORDER — DEXTROSE MONOHYDRATE 25 G/50ML
INJECTION, SOLUTION INTRAVENOUS
Status: COMPLETED
Start: 2021-04-21 | End: 2021-04-21

## 2021-04-21 RX ADMIN — VASOPRESSIN 0.03 UNITS/MIN: 20 INJECTION INTRAVENOUS at 23:39

## 2021-04-21 RX ADMIN — SODIUM CHLORIDE 80 MG: 9 INJECTION, SOLUTION INTRAVENOUS at 14:25

## 2021-04-21 RX ADMIN — DEXTROSE MONOHYDRATE: 50 INJECTION, SOLUTION INTRAVENOUS at 14:10

## 2021-04-21 RX ADMIN — SODIUM CHLORIDE, POTASSIUM CHLORIDE, SODIUM LACTATE AND CALCIUM CHLORIDE 1224 ML: 600; 310; 30; 20 INJECTION, SOLUTION INTRAVENOUS at 09:51

## 2021-04-21 RX ADMIN — CEFTRIAXONE SODIUM 1 G: 1 INJECTION, POWDER, FOR SOLUTION INTRAMUSCULAR; INTRAVENOUS at 10:45

## 2021-04-21 RX ADMIN — OCTREOTIDE ACETATE 50 MCG: 100 INJECTION, SOLUTION INTRAVENOUS; SUBCUTANEOUS at 12:51

## 2021-04-21 RX ADMIN — PROTHROMBIN, COAGULATION FACTOR VII HUMAN, COAGULATION FACTOR IX HUMAN, COAGULATION FACTOR X HUMAN, PROTEIN C, PROTEIN S HUMAN, AND WATER 2000 UNITS: KIT at 12:00

## 2021-04-21 RX ADMIN — CALCIUM CHLORIDE 1000 MG: 100 INJECTION, SOLUTION INTRAVENOUS at 14:19

## 2021-04-21 RX ADMIN — Medication 100 MCG: at 12:00

## 2021-04-21 RX ADMIN — HYDROCORTISONE SODIUM SUCCINATE 50 MG: 100 INJECTION, POWDER, FOR SOLUTION INTRAMUSCULAR; INTRAVENOUS at 21:36

## 2021-04-21 RX ADMIN — NOREPINEPHRINE BITARTRATE 5 MCG/MIN: 1 INJECTION, SOLUTION, CONCENTRATE INTRAVENOUS at 12:00

## 2021-04-21 RX ADMIN — SODIUM BICARBONATE 125 ML/HR: 84 INJECTION, SOLUTION INTRAVENOUS at 12:00

## 2021-04-21 RX ADMIN — IOHEXOL 100 ML: 350 INJECTION, SOLUTION INTRAVENOUS at 10:00

## 2021-04-21 RX ADMIN — Medication 100 MCG: at 10:51

## 2021-04-21 RX ADMIN — NOREPINEPHRINE BITARTRATE 25 MCG/MIN: 1 INJECTION, SOLUTION, CONCENTRATE INTRAVENOUS at 22:31

## 2021-04-21 RX ADMIN — NOREPINEPHRINE BITARTRATE 5 MCG/MIN: 1 INJECTION INTRAVENOUS at 12:00

## 2021-04-21 RX ADMIN — NOREPINEPHRINE BITARTRATE 25 MCG/MIN: 1 INJECTION, SOLUTION, CONCENTRATE INTRAVENOUS at 18:35

## 2021-04-21 RX ADMIN — KETAMINE HYDROCHLORIDE 50 MG: 50 INJECTION INTRAMUSCULAR; INTRAVENOUS at 10:45

## 2021-04-21 RX ADMIN — SODIUM BICARBONATE 200 ML/HR: 84 INJECTION, SOLUTION INTRAVENOUS at 20:36

## 2021-04-21 RX ADMIN — KETAMINE HYDROCHLORIDE 50 MG: 50 INJECTION INTRAMUSCULAR; INTRAVENOUS at 10:46

## 2021-04-21 RX ADMIN — METRONIDAZOLE 500 MG: 500 INJECTION, SOLUTION INTRAVENOUS at 14:06

## 2021-04-21 RX ADMIN — CALCIUM CHLORIDE 1 G: 100 INJECTION, SOLUTION INTRAVENOUS at 19:37

## 2021-04-21 RX ADMIN — OCTREOTIDE ACETATE 50 MCG/HR: 200 INJECTION, SOLUTION INTRAVENOUS; SUBCUTANEOUS at 12:51

## 2021-04-21 RX ADMIN — ROCURONIUM BROMIDE 50 MG: 10 INJECTION, SOLUTION INTRAVENOUS at 10:47

## 2021-04-21 RX ADMIN — VASOPRESSIN 0.03 UNITS/MIN: 20 INJECTION INTRAVENOUS at 13:30

## 2021-04-21 RX ADMIN — DEXTROSE MONOHYDRATE: 25 INJECTION, SOLUTION INTRAVENOUS at 09:02

## 2021-04-21 RX ADMIN — SODIUM CHLORIDE 8 MG/HR: 9 INJECTION, SOLUTION INTRAVENOUS at 14:40

## 2021-04-21 RX ADMIN — SODIUM BICARBONATE 100 MEQ: 84 INJECTION, SOLUTION INTRAVENOUS at 19:36

## 2021-04-21 RX ADMIN — METRONIDAZOLE 500 MG: 500 INJECTION, SOLUTION INTRAVENOUS at 11:00

## 2021-04-21 RX ADMIN — METRONIDAZOLE 500 MG: 500 INJECTION, SOLUTION INTRAVENOUS at 21:36

## 2021-04-21 ASSESSMENT — PAIN DESCRIPTION - PAIN TYPE
TYPE: ACUTE PAIN;CHRONIC PAIN
TYPE: ACUTE PAIN;CHRONIC PAIN

## 2021-04-21 ASSESSMENT — FIBROSIS 4 INDEX: FIB4 SCORE: 32.83

## 2021-04-21 NOTE — ED NOTES
MD at bedside for central line placement. Pt having increased work of breathing,  Respiratory called for intubation

## 2021-04-21 NOTE — ASSESSMENT & PLAN NOTE
Intubated in ER on 4/21 for airway protection/hypoxia  Cont full vent support  RT/O2 protocols  Cont vent bundle protocols  SAT/SBTs when appropriate

## 2021-04-21 NOTE — CARE PLAN
Problem: Safety  Goal: Free from accidental injury  Outcome: PROGRESSING AS EXPECTED  Intervention:   Pt call light and personal belongings within reach, monitor on and patient’s cardiac rhythm being monitored.   Bed in low position, non skid socks on, bed alarm on.  Pt currently not able to communicate need for assistance.   Pt near nursing station.    Problem: Pain  Goal: Alleviation of Pain or a reduction in pain to the patient’s comfort goal  Outcome: PROGRESSING AS EXPECTED  Interventions:   Non-verbal pain scale used to assess pain every 4 hours and PRN.   Non-pharmacological pain measures taken such as rest,repositioning.

## 2021-04-21 NOTE — ASSESSMENT & PLAN NOTE
Patient has had coffee-ground emesis from her orogastric tube.  She did have complaint of abdominal pain leading up to being found down, question if there may not be some peptic ulcer disease  We are starting empirically on IV Protonix, and octreotide  GI has been consulted though I do not know what they can do acutely given the severity of her illness  Follow serial H&H every 6 hours  Getting a stat TEG and will address coagulopathy given that she takes apixaban with Kcentra  Large bore peripheral access and central line in place

## 2021-04-21 NOTE — ASSESSMENT & PLAN NOTE
CT of the head shows no acute processes.  This is likely secondary to an intra-abdominal process and represents an acute metabolic encephalopathy  Addressing multiple metabolic derangements, and hypotension.

## 2021-04-21 NOTE — ED NOTES
Pharmacy Medication Reconciliation      Medication reconciliation updated and complete per pt home pharmacy  Allergies have been verified and updated per pt home pharmacy  No oral ABX within the last 14 days  Patient home pharmacy:[facility]    No current facility-administered medications on file prior to encounter.     Current Outpatient Medications on File Prior to Encounter   Medication Sig Dispense Refill   • atorvastatin (LIPITOR) 80 MG tablet Take 80 mg by mouth every day.     • apixaban (ELIQUIS) 5mg Tab Take 5 mg by mouth 2 times a day.     • vitamin D, Ergocalciferol, (DRISDOL) 1.25 MG (24997 UT) Cap capsule Take 50,000 Units by mouth every 7 days.

## 2021-04-21 NOTE — ED PROVIDER NOTES
ED Provider Note    CHIEF COMPLAINT  Mental status  HPI  Wilma Ludwig is a 60 y.o. female who presents to the emergency department after having a ground-level fall and altered mental status.  Per the patient's significant other, the patient was last seen appropriate last night 9 PM, this morning around 8:30 AM the patient was found on the ground with severe altered mental status and nosebleed.  Per EMS when they arrived, the patient's blood sugar was not detectable in the give her an amp of D50.  She is alert and oriented x2, GCS was 14, she was moaning in pain.  Per the son, the patient was complaining of abdominal pain the last 2 days.  There is no other information gleaned from the conversation.    REVIEW OF SYSTEMS  Unable to assess secondary to altered mental status    PAST MEDICAL HISTORY  Recent CVA with alteplase anterior arterial clot retrieval 1 month prior  Methamphetamine abuse  Thyroid nodules    FAMILY HISTORY  Noncontributory    SOCIAL HISTORY  Social History     Socioeconomic History   • Marital status: Single     Spouse name: Not on file   • Number of children: Not on file   • Years of education: Not on file   • Highest education level: Not on file   Occupational History   • Not on file   Tobacco Use   • Smoking status: Not on file   Substance and Sexual Activity   • Alcohol use: Not on file   • Drug use: Not on file   • Sexual activity: Not on file   Other Topics Concern   • Not on file   Social History Narrative   • Not on file     Social Determinants of Health     Financial Resource Strain:    • Difficulty of Paying Living Expenses:    Food Insecurity:    • Worried About Running Out of Food in the Last Year:    • Ran Out of Food in the Last Year:    Transportation Needs:    • Lack of Transportation (Medical):    • Lack of Transportation (Non-Medical):    Physical Activity:    • Days of Exercise per Week:    • Minutes of Exercise per Session:    Stress:    • Feeling of Stress :    Social  "Connections:    • Frequency of Communication with Friends and Family:    • Frequency of Social Gatherings with Friends and Family:    • Attends [employer] Services:    • Active Member of Clubs or Organizations:    • Attends Club or Organization Meetings:    • Marital Status:    Intimate Partner Violence:    • Fear of Current or Ex-Partner:    • Emotionally Abused:    • Physically Abused:    • Sexually Abused:        SURGICAL HISTORY  No past surgical history on file.    CURRENT MEDICATIONS  Home Medications     Reviewed by Giovanna Luo (Pharmacy Tech) on 04/21/21 at 1019  Med List Status: Complete   Medication Last Dose Status   apixaban (ELIQUIS) 5mg Tab unk Active   atorvastatin (LIPITOR) 80 MG tablet unk Active   vitamin D, Ergocalciferol, (DRISDOL) 1.25 MG (99010 UT) Cap capsule unk Active                ALLERGIES  Allergies   Allergen Reactions   • Codeine Unspecified     On file at pt home pharmacy     • Penicillins Unspecified     On file at pt home pharmacy       PHYSICAL EXAM  VITAL SIGNS: /58   Pulse 71   Temp (!) 29.6 °C (85.3 °F) (Bladder)   Resp 20   Ht 1.6 m (5' 3\")   Wt 57.5 kg (126 lb 12.2 oz)   SpO2 93%   BMI 22.46 kg/m²      Nursing notes and vitals reviewed.  Constitutional: Well developed, Well nourished, Full back board a c-spine immobilization  Eyes: PERRLA, EOMI, scleral icterus  HENT: Symmetric, dried blood in bilateral nares, no septal hematoma, no facial tenderness or deformity, she does have dried blood in her oropharynx     Neck: No cervical spine tenderness, no step off deformity, patient is in a c-spine immobilization collar, external jugular vein catheterization the right.   Cardiovascular: Normal heart rate, Normal rhythm, No murmurs, No rubs, No gallops, Normal heart tones.   Thorax & Lungs: Grunting respirations, equal breath sounds bilaterally   abdomen: Bowel sounds normal, Soft, diffuse abdominal tenderness, surgical scar in the abdominal cavity no pulsatile " mass   skin: Jaundiced throughout, ecchymosis surrounding the nasal bon, cool to touch throughout, profound cyanosis from the mid e shins down to her toes  Rectal: Rectal prolapse with slight active bleeding  musculoskeletal: Cyanosis in bilateral lower extremities, pelvis is stable, no thoracic or lumbar spine tenderness or step-off deformity, decreased cap refill bilateral lower extremities  extremities: No long bone tenderness or deformity, distal pulses are brisk, pelvis is stable.   Neurologic: Alert & oriented x 2, groaning, GCS of 13, in arms and legs bilaterally, following commands      Results for orders placed or performed during the hospital encounter of 04/21/21   SARS-COV Antigen AUGUST: Collect dry nasal swab AND NP swab in VTM   Result Value Ref Range    SARS-CoV-2 Source Nasal Swab    SARS-CoV-2 PCR (24 hour In-House): Collect NP swab in VTM    Specimen: Nasopharyngeal; Respirate   Result Value Ref Range    SARS-CoV-2 Source NP Swab    AMMONIA   Result Value Ref Range    Ammonia 76 (H) 11 - 45 umol/L   LACTIC ACID   Result Value Ref Range    Lactic Acid 14.6 (HH) 0.5 - 2.0 mmol/L   CREATINE KINASE   Result Value Ref Range    CPK Total 1947 (HH) 0 - 154 U/L   MAGNESIUM   Result Value Ref Range    Magnesium 2.8 (H) 1.5 - 2.5 mg/dL   PHOSPHORUS   Result Value Ref Range    Phosphorus 8.0 (H) 2.5 - 4.5 mg/dL   TROPONIN   Result Value Ref Range    Troponin T 187 (H) 6 - 19 ng/L   COMP METABOLIC PANEL   Result Value Ref Range    Sodium 119 (LL) 135 - 145 mmol/L    Potassium 4.3 3.6 - 5.5 mmol/L    Chloride 87 (L) 96 - 112 mmol/L    Co2 5 (LL) 20 - 33 mmol/L    Anion Gap 27.0 (H) 7.0 - 16.0    Glucose 137 (H) 65 - 99 mg/dL    Bun 70 (H) 8 - 22 mg/dL    Creatinine 5.10 (HH) 0.50 - 1.40 mg/dL    Calcium 6.3 (LL) 8.5 - 10.5 mg/dL    AST(SGOT) 2038 (HH) 12 - 45 U/L    ALT(SGPT) 1713 (HH) 2 - 50 U/L    Alkaline Phosphatase 377 (H) 30 - 99 U/L    Total Bilirubin 4.9 (H) 0.1 - 1.5 mg/dL    Albumin 1.5 (L) 3.2 - 4.9  g/dL    Total Protein 3.2 (L) 6.0 - 8.2 g/dL    Globulin 1.7 (L) 1.9 - 3.5 g/dL    A-G Ratio 0.9 g/dL   PLATELET MAPPING WITH BASIC TEG   Result Value Ref Range    Reaction Time Initial-R See Comment 5.0 - 10.0 min    Clot Kinetics-K See Comment 1.0 - 3.0 min    Clot Angle-Angle See Comment 53.0 - 72.0 degrees    Maximum Clot Strength-MA See Comment 50.0 - 70.0 mm    Lysis 30 minutes-LY30 See Comment 0.0 - 8.0 %    % Inhibition ADP 60.2 %    % Inhibition AA 13.6 %    TEG Algorithm Link Algorithm    DIAGNOSTIC ALCOHOL   Result Value Ref Range    Diagnostic Alcohol <10.1 0.0 - 10.0 mg/dL   DIAGNOSTIC ALCOHOL   Result Value Ref Range    Diagnostic Alcohol <10.1 0.0 - 10.0 mg/dL   CBC WITHOUT DIFFERENTIAL   Result Value Ref Range    WBC 7.9 4.8 - 10.8 K/uL    RBC 3.78 (L) 4.20 - 5.40 M/uL    Hemoglobin 10.7 (L) 12.0 - 16.0 g/dL    Hematocrit 35.1 (L) 37.0 - 47.0 %    MCV 92.9 81.4 - 97.8 fL    MCH 28.3 27.0 - 33.0 pg    MCHC 30.5 (L) 33.6 - 35.0 g/dL    RDW 57.1 (H) 35.9 - 50.0 fL    Platelet Count 93 (L) 164 - 446 K/uL    MPV 11.5 9.0 - 12.9 fL   Comp Metabolic Panel   Result Value Ref Range    Sodium 119 (LL) 135 - 145 mmol/L    Potassium 4.4 3.6 - 5.5 mmol/L    Chloride 86 (L) 96 - 112 mmol/L    Co2 6 (LL) 20 - 33 mmol/L    Anion Gap 27.0 (H) 7.0 - 16.0    Glucose 188 (H) 65 - 99 mg/dL    Bun 72 (HH) 8 - 22 mg/dL    Creatinine 5.00 (H) 0.50 - 1.40 mg/dL    Calcium 6.7 (LL) 8.5 - 10.5 mg/dL    AST(SGOT) 2427 (HH) 12 - 45 U/L    ALT(SGPT) 2073 (HH) 2 - 50 U/L    Alkaline Phosphatase 459 (H) 30 - 99 U/L    Total Bilirubin 6.1 (H) 0.1 - 1.5 mg/dL    Albumin 1.8 (L) 3.2 - 4.9 g/dL    Total Protein 4.0 (L) 6.0 - 8.2 g/dL    Globulin 2.2 1.9 - 3.5 g/dL    A-G Ratio 0.8 g/dL   Prothrombin Time   Result Value Ref Range    PT >120.0 () 12.0 - 14.6 sec    INR >=10.00 () 0.87 - 1.13   APTT   Result Value Ref Range    APTT 201.8 () 24.7 - 36.0 sec   HCG QUAL SERUM   Result Value Ref Range    Beta-Hcg Qualitative Serum  Negative Negative   DRUGS OF ABUSE SERUM   Result Value Ref Range    Acetaminophen -Tylenol <5 (L) 10 - 30 ug/mL    Salicylates, Quant. 1 (L) 15 - 25 mg/dL   COD - Adult (Type and Screen)   Result Value Ref Range    ABO Grouping Only A     Rh Grouping Only POS     Antibody Screen-Cod NEG    ABO Rh Confirm   Result Value Ref Range    ABO Rh Confirm A POS    SARS-COV Antigen AUGUST: Collect dry nasal swab AND NP swab in VTM   Result Value Ref Range    SARS-CoV-2 Source Nasal Swab     SARS-COV ANTIGEN AUGUST NotDetected Not-Detected   COMPONENT CELLULAR   Result Value Ref Range    Component R       R99                 Red Cells, LR       [id number]   issued       04/21/21   13:47      Product Type R99     Dispense Status issued     Unit Number (Barcoded) [id number]     Product Code (Barcoded) R0117I92     Blood Type (Barcoded) 0600     Component R       R99                 Red Cells, LR       [id number]   issued       04/21/21   14:27      Product Type R99     Dispense Status issued     Unit Number (Barcoded) [id number]     Product Code (Barcoded) J9894G80     Blood Type (Barcoded) 6200    ESTIMATED GFR   Result Value Ref Range    GFR If  11 (A) >60 mL/min/1.73 m 2    GFR If Non African American 9 (A) >60 mL/min/1.73 m 2   ESTIMATED GFR   Result Value Ref Range    GFR If African American 10 (A) >60 mL/min/1.73 m 2    GFR If Non African American 9 (A) >60 mL/min/1.73 m 2   HGB (Hemoglobin) for 48 hours   Result Value Ref Range    Hemoglobin 7.6 (L) 12.0 - 16.0 g/dL   Basic Metabolic Panel   Result Value Ref Range    Sodium 128 (L) 135 - 145 mmol/L    Potassium 4.6 3.6 - 5.5 mmol/L    Chloride 98 96 - 112 mmol/L    Co2 7 (LL) 20 - 33 mmol/L    Glucose 73 65 - 99 mg/dL    Bun 63 (H) 8 - 22 mg/dL    Creatinine 4.72 (H) 0.50 - 1.40 mg/dL    Calcium 6.2 (LL) 8.5 - 10.5 mg/dL    Anion Gap 23.0 (H) 7.0 - 16.0   DIAGNOSTIC ALCOHOL   Result Value Ref Range    Diagnostic Alcohol <10.1 0.0 - 10.0 mg/dL    CREATINE KINASE   Result Value Ref Range    CPK Total 1730 (HH) 0 - 154 U/L   ESTIMATED GFR   Result Value Ref Range    GFR If  11 (A) >60 mL/min/1.73 m 2    GFR If Non African American 9 (A) >60 mL/min/1.73 m 2   FRESH FROZEN PLASMA   Result Value Ref Range    Component F       TA2                 Thawed Plasma 2     [id number]   issued       21   13:47      Product Type Thawed Apheresis Plasma 2nd Cont     Dispense Status issued     Unit Number (Barcoded) [id number]     Product Code (Barcoded) L7351X14     Blood Type (Barcoded) 6200     Component F       TA1                 Thawed Plasma 1     [id number]   selected     21   16:00      Product Type Thawed Apheresis Plasma 1st Cont     Dispense Status selected     Unit Number (Barcoded) [id number]     Product Code (Barcoded) Z2468R92     Blood Type (Barcoded) 8400    BASIC TEG W HEPARINASE   Result Value Ref Range    React Time Initial Hep 53.3 (H) 5.0 - 10.0 min    Clot Kinetics Heparinase 10.8 (H) 1.0 - 3.0 min    Clot Angle Heparinase 19.7 (L) 53.0 - 72.0 degrees    Max Clot Heparinase 49.2 (L) 50.0 - 70.0 mm    Lysis 30 min Heparinase 0.0 0.0 - 8.0 %   EKG   Result Value Ref Range    Report       [facility]    Test Date:  2021  Pt Name:    MIRELLA PÉREZ              Department:   MRN:        [id number]                      Room:       [facility]  Gender:     Female                       Technician: VAZQUEZ  :        1960                   Requested By:MARY KAY CARPIO  Order #:    [id number]                    Reading MD: Marlo Rdz MD    Measurements  Intervals                                Axis  Rate:       68                           P:          78  WY:         184                          QRS:        -20  QRSD:       102                          T:          66  QT:         451  QTc:        480    Interpretive Statements  Sinus rhythm  Anterolateral infarct, old  Prolonged QTC  Electronically  Signed On 4- 15:16:08 PDT by Marlo Rdz MD     POCT glucose device results   Result Value Ref Range    Glucose - Accu-Ck 19 (LL) 65 - 99 mg/dL   POCT glucose device results   Result Value Ref Range    Glucose - Accu-Ck 260 (H) 65 - 99 mg/dL   POCT glucose device results   Result Value Ref Range    Glucose - Accu-Ck 72 65 - 99 mg/dL   POCT arterial blood gas device results   Result Value Ref Range    Ph 6.793 (LL) 7.400 - 7.500    Pco2 46.1 (H) 26.0 - 37.0 mmHg    Po2 63 (L) 64 - 87 mmHg    Tco2 <10 (L) 20 - 33 mmol/L    S02 65 (L) 93 - 99 %    Hco3 7.1 (L) 17.0 - 25.0 mmol/L    BE -26 (L) -4 - 3 mmol/L    Body Temp see below degrees    O2 Therapy 50 %    iPF Ratio 126     Specimen Arterial          RADIOLOGY/PROCEDURES  US-ABDOMEN COMPLETE SURVEY   Final Result      1.  Sludge and gallstones within the gallbladder. No evidence of biliary ductal dilatation.      2.  The liver is echogenic consistent with fatty change versus hepatocellular dysfunction.      3.  Small amount of ascites.      4.  Small left pleural effusion.      5.  Echogenic kidneys bilaterally consistent with medical renal disease.      DX-ABDOMEN FOR TUBE PLACEMENT   Final Result      1.  NG tube located within the stomach with the tip overlying the gastric fundus.      2.  Mildly dilated loops of small intestine diffusely.      DX-CHEST-LIMITED (1 VIEW)   Final Result      1.  Support devices as described above.      2.  Diffuse bilateral interstitial infiltrates consistent with interstitial edema versus atypical infection.      3.  Cardiomegaly.      CT-TSPINE W/O PLUS RECONS   Final Result         Negative CT scan of the thoracic spine without contrast.      CT-LSPINE W/O PLUS RECONS   Final Result      No evidence of fracture of the lumbar spine.      CT-CTA COMPLETE THORACOABDOMINAL AORTA   Final Result      1.  Extensive atherosclerotic change of the aorta and originating vessels. No evidence of aneurysm or dissection.      2.   Very limited evaluation of the abdominal and pelvic organs due to the study being ordered as a CT angiogram. No gross traumatic injury.      3.  Small amount of ascites.      4.  Small bilateral pleural effusions with bibasilar atelectasis and emphysematous change of the lungs.      5.  Small pericardial effusion.      6.  Fatty liver.      7.  Anasarca.      8.  Mild superior endplate compression fractures involving T7 and T8 which are of uncertain duration.            CT-HEAD W/O   Final Result      1.  No evidence of acute intracranial process.      2.  Atrophy.      3.  Chronic ischemic change.      CT-MAXILLOFACIAL W/O PLUS RECONS   Final Result         1.  No evidence of maxillofacial fracture.      2.  Chronic left maxillary, bilateral ethmoid, and sphenoid sinusitis.      3.  Left maxillary sinus nasal antral window.      CT-CSPINE WITHOUT PLUS RECONS   Final Result         1.  Negative CT scan of the cervical spine.  No fracture or subluxation.      2.  Multilevel degenerative change.      DX-PELVIS-1 OR 2 VIEWS   Final Result      No evidence of fracture or dislocation.      DX-CHEST-LIMITED (1 VIEW)   Final Result      Cardiomegaly.      US-ABDOMEN [employer] LTD (FOR ED USE ONLY)    (Results Pending)     Central Line Placement Procedure Note  Indication: vascular access, poor peripheral access, central venous monitoring, centrally administered medications, severe bleeding, trauma and need for frequent blood draws    Consent: Unable to be obtained due to the emergent nature of this procedure.    Procedure: The patient was positioned appropriately and the skin over the right internal jugular vein was prepped with betadine and draped in a sterile fashion and prepped with Chloraprep. Local anesthesia was not performed due to the emergent nature of this procedure.  A large bore needle was used to identify the vein.  A guide wire was then inserted into the vein through the needle. A triple lumen catheter was  then inserted into the vessel over the guide wire using the Seldinger technique.  All ports showed good, free flowing blood return and were flushed with saline solution.  The catheter was then securely fastened to the skin with sutures and covered with a sterile dressing.  A post procedure X-ray was ordered and showed good line position.    The patient tolerated the procedure well.    Complications: None      Intubation Procedure Note    Indication: impending respiratory failure, airway compromise, comatose state, hypoxia, hypercarbia and airway protection    Consent: Unable to be obtained due to the emergent nature of this procedure.    Medications Used: ketamine intravenously and rocuronium intravenously    Procedure: The patient was placed in the appropriate position.  Cricoid pressure was utilized.  Intubation was performed by direct laryngoscopy using a laryngoscope and a 7.5 cuffed endotracheal tube.  The cuff was then inflated and the tube was secured appropriately at a distance of 24 cm to the dental ridge.  Initial confirmation of placement included bilateral breath sounds, an end tidal CO2 detector, absence of sounds over the stomach, tube fogging, adequate chest rise, adequate pulse oximetry reading and improved skin color.  A chest x-ray to verify correct placement of the tube showed appropriate tube position.    The patient tolerated the procedure well.     Complications: None            COURSE & MEDICAL DECISION MAKING  Pertinent Labs & Imaging studies reviewed. (See chart for details)  This is a critically ill 60-year-old female presents after ground-level fall.  Here in the emergency department, the patient was activated as a trauma red secondary abdominal status and fall.  Initially she is extremely cold with a temperature of 29.6 and she received IV fluids that are warmed as well as bear hugger.  In addition the trauma evaluation, she had a decreased GCS but she does follow commands appropriately  do not believe the patient required rapid sequence intubation.  She had traumatic facial injuries as well as very cold and cyanotic toes.  We able to identify an arterial pulse via ultrasound at that point time and no believe she had peripheral arterial disease.  I discussed the patient with Dr. Osborne who agrees for trauma evaluation.  Prior to evaluation I discussed the patient with her significant other and he was stating that she had abdominal discomfort last 2 days and is concerned for possible aortic dissection, aortic aneurysm therefore CT thoracoabdominal evaluation including CT scan of the head, cervical spine, thoracic and lumbar spines were completed.  There is no evidence of aortic dissection, aortic aneurysm or significant traumatic injury here in the emergency department.  While here in the emergency department, she was found to have a blood sugar of 19 and received 1 amp of D50 in the trauma bay, IV fluids secondary to a lactic acidosis of 14.6 with unknown etiology possible sepsis.  She is empirically started on ceftriaxone and Flagyl for possible intra-abdominal infection.  In addition, she is found to be in liver failure with a AST of 2004 and 27, ALT of 2073, alkaline phosphatase of 459, total bilirubin 6.1, INR greater than 10.  She did not have evidence of profound anemia with a hemoglobin of 10.7 hematocrit 35.1 and a platelet count of 93.  In addition, the patient had profound hyponatremia with a sodium of 119, CO2 of 6, anion gap of 27, creatinine of 5 and BUN of 72.  I believe she has acute kidney injury resulting in her acidosis.  Carter was placed she had a minimal amount/scant amount of urine that was expressed.  I have started the patient on IV fluid administration, in addition D5 bicarbonate and fluid infusion secondary to profound acidosis and acute kidney injury.  For her acute liver injury, there is no evidence of significant gallbladder etiology and she had a normal common bile duct  without evidence of choledocholithiasis.  The patient also had a prolapsed rectum that I reduced in the emergency department.  After intubating the patient secondary to her increasing respiratory compromise, the patient was found to have dark red blood in her OG tube.  I discussed the patient with Dr. Rosenberg with GI.  Discussed the patient Dr. Norris for critical care team as well as Dr. Ivan for the ICU hospitalist care.    Evaluation, the patient's toes are no longer cyanotic and has significant perfusion, she has no evidence of acute intrathoracic abdominal traumatic injury requiring surgical intervention.  She has a significant multisystem organ failure picture with a very poor prognosis.  She received IV fluids, IV antibiotics, and she will be hospitalized in critical care.    CRITICAL CARE  The very real possibilty of a deterioration of this patient's condition required the highest level of my preparedness for sudden, emergent intervention.  I provided critical care services, which included medication orders, frequent reevaluations of the patient's condition and response to treatment, ordering and reviewing test results, and discussing the case with various consultants.  The critical care time associated with the care of the patient was 45 minutes. Review chart for interventions. This time is exclusive of any other billable procedures.       FINAL IMPRESSION  Multisystem organ failure  Liver failure  Acute kidney injury  Lactic acidosis  GI bleed  Rapid sequence intubation  Central venous catheter placement  Anemia  Critical care time 45 minutes       Electronically signed by: Thai Garcia D.O., 4/21/2021 9:14 AM

## 2021-04-21 NOTE — H&P
Hospital Medicine History & Physical Note    Date of Service  4/21/2021    Primary Care Physician  Pcp Pt States None    Consultants  Critical care    Code Status  Full Code    Chief Complaint  No chief complaint on file.      History of Presenting Illness  60 y.o. female who presented 4/21/2021 who was found down reportedly by the patient's son.  History is per EMS and review of chart, no family or friends at the bedside, I have attempted to call family but have been unable to get anybody on the phone.  Apparently the patient has been complaining of some abdominal pain for several days but had been up and ambulating alert and otherwise behaving normally.  This morning she was found down in the bathroom for none known amount of time but potentially up to 12 hours.  EMS was called, they found her blood pressure at 70/60, and her blood sugar on scene was 19.  Her GCS was 14.  In the ED the patient was intubated for airway protection.  Her blood pressures remained low and she has been fluid bolused.  We have been initiated vasopressors.  She has had a rectal prolapse which has been reduced by the emergency room physician who also noted bright red blood per rectum.  She has also had coffee-ground aspirate from her OG.    Recent medical history significant for a stroke which was apparently diagnosed a month or so ago.  She was started on apixaban specific indication is unknown.    Critical care time 60 minutes.  I spent that time at the bedside titrating pressors, involved in her fluid resuscitation as well as reversing her coagulopathy and addressing her GI bleed, consulting with specialist.    Review of Systems  Review of Systems   Unable to perform ROS: Intubated       Past Medical History   has no past medical history on file.    Surgical History   has no past surgical history on file.     Family History  family history is not on file.     Social History       Allergies  Allergies   Allergen Reactions   • Codeine  Unspecified     On file at pt home pharmacy     • Penicillins Unspecified     On file at pt home pharmacy       Medications  Prior to Admission Medications   Prescriptions Last Dose Informant Patient Reported? Taking?   apixaban (ELIQUIS) 5mg Tab unk at [facility] Home [facility] Yes Yes   Sig: Take 5 mg by mouth 2 times a day.   atorvastatin (LIPITOR) 80 MG tablet unk at [facility] Yes Yes   Sig: Take 80 mg by mouth every day.   vitamin D, Ergocalciferol, (DRISDOL) 1.25 MG (33334 UT) Cap capsule unk at [facility] Yes Yes   Sig: Take 50,000 Units by mouth every 7 days.      Facility-Administered Medications: None       Physical Exam  Temp:  [27.6 °C (81.7 °F)-29.2 °C (84.6 °F)] 29.2 °C (84.6 °F)  Pulse:  [57-67] 63  Resp:  [15-35] 20  BP: ()/(0-81) 97/36  SpO2:  [79 %-100 %] 100 %    Physical Exam  Vitals reviewed.   Constitutional:       General: She is not in acute distress.     Appearance: She is well-developed. She is not diaphoretic.   HENT:      Head: Normocephalic and atraumatic.   Neck:      Vascular: No JVD.   Cardiovascular:      Rate and Rhythm: Regular rhythm. Tachycardia present.      Heart sounds: Murmur present.   Pulmonary:      Effort: No respiratory distress.      Breath sounds: No stridor. No wheezing or rales.      Comments: Intubated  Abdominal:      General: Abdomen is flat. There is no distension.      Palpations: Abdomen is soft.      Tenderness: There is no guarding or rebound.   Musculoskeletal:      Right lower leg: No edema.      Left lower leg: No edema.      Comments: Extremities are cool to touch good peripheral pulses   Skin:     General: Skin is warm and dry.      Findings: No rash.   Neurological:      Comments: Patient is sedated on my examination         Laboratory:  Recent Labs     04/21/21  0855   WBC 7.9   RBC 3.78*   HEMOGLOBIN 10.7*   HEMATOCRIT 35.1*   MCV 92.9   MCH 28.3   MCHC 30.5*   RDW 57.1*   PLATELETCT 93*   MPV 11.5     Recent  Labs     04/21/21  0855 04/21/21  0952   SODIUM 119* 119*   POTASSIUM 4.4 4.3   CHLORIDE 86* 87*   CO2 6* 5*   GLUCOSE 188* 137*   BUN 72* 70*   CREATININE 5.00* 5.10*   CALCIUM 6.7* 6.3*     Recent Labs     04/21/21  0855 04/21/21  0952   ALTSGPT 2073* 1713*   ASTSGOT 2427* 2038*   ALKPHOSPHAT 459* 377*   TBILIRUBIN 6.1* 4.9*   GLUCOSE 188* 137*     Recent Labs     04/21/21  0952   APTT 201.8*   INR >=10.00*     No results for input(s): NTPROBNP in the last 72 hours.      Recent Labs     04/21/21  0952   TROPONINT 187*       Imaging:  DX-ABDOMEN FOR TUBE PLACEMENT   Final Result      1.  NG tube located within the stomach with the tip overlying the gastric fundus.      2.  Mildly dilated loops of small intestine diffusely.      DX-CHEST-LIMITED (1 VIEW)   Final Result      1.  Support devices as described above.      2.  Diffuse bilateral interstitial infiltrates consistent with interstitial edema versus atypical infection.      3.  Cardiomegaly.      CT-TSPINE W/O PLUS RECONS   Final Result         Negative CT scan of the thoracic spine without contrast.      CT-LSPINE W/O PLUS RECONS   Final Result      No evidence of fracture of the lumbar spine.      CT-CTA COMPLETE THORACOABDOMINAL AORTA   Final Result      1.  Extensive atherosclerotic change of the aorta and originating vessels. No evidence of aneurysm or dissection.      2.  Very limited evaluation of the abdominal and pelvic organs due to the study being ordered as a CT angiogram. No gross traumatic injury.      3.  Small amount of ascites.      4.  Small bilateral pleural effusions with bibasilar atelectasis and emphysematous change of the lungs.      5.  Small pericardial effusion.      6.  Fatty liver.      7.  Anasarca.      8.  Mild superior endplate compression fractures involving T7 and T8 which are of uncertain duration.            CT-HEAD W/O   Final Result      1.  No evidence of acute intracranial process.      2.  Atrophy.      3.  Chronic  ischemic change.      CT-MAXILLOFACIAL W/O PLUS RECONS   Final Result         1.  No evidence of maxillofacial fracture.      2.  Chronic left maxillary, bilateral ethmoid, and sphenoid sinusitis.      3.  Left maxillary sinus nasal antral window.      CT-CSPINE WITHOUT PLUS RECONS   Final Result         1.  Negative CT scan of the cervical spine.  No fracture or subluxation.      2.  Multilevel degenerative change.      DX-PELVIS-1 OR 2 VIEWS   Final Result      No evidence of fracture or dislocation.      DX-CHEST-LIMITED (1 VIEW)   Final Result      Cardiomegaly.      US-ABDOMEN F.A.S.T. LTD (FOR ED USE ONLY)    (Results Pending)   US-ABDOMEN COMPLETE SURVEY    (Results Pending)         Assessment/Plan:  I anticipate this patient will require at least two midnights for appropriate medical management, necessitating inpatient admission.    No new Assessment & Plan notes have been filed under this hospital service since the last note was generated.  Service: Hospital Medicine

## 2021-04-21 NOTE — DISCHARGE PLANNING
Anticipated Discharge Disposition: Uncertain    Action: LSW received update from ED SOPHIE Veliz. Pt lives with boyfriend who stated he found the pt unresponsive in the bathroom. CM has not been able to find NOK at this time, only contact we have is boyfriend. Per Keren's notes pt may have other family but is estranged from them at this time.     LSW completed chart review of pt's previous admissions. Pt was recently at [facility]. LSW spoke with [facility] and they had no contact information for pt other than boyfrienmatt Joni. LSW contacted [facility], Pt was referred there a few years ago but never was established with them per medical records.     LSW lvm with boyfriendJoni to conduct assessment and inform him where pt will be.       Plan: LSW will continue to reach out to Joni for assessment and NOK

## 2021-04-21 NOTE — ASSESSMENT & PLAN NOTE
Renal ultrasound did not show any evidence of obstruction  Patient is being fluid resuscitated for her shock  Renally dose medications as appropriate  Follow every 6 labs

## 2021-04-21 NOTE — ASSESSMENT & PLAN NOTE
Uncertain baseline  Possibly diminished due to acute illness and sepsis  Follow daily  Given presence of active GI bleed transfuse to goal above 50

## 2021-04-21 NOTE — ASSESSMENT & PLAN NOTE
Patient was down for an unknown amount of time but potentially up to 12 hours  Start bicarbonate drip  Follow urine output and renal function closely  Follow electrolytes closely for hyperkalemia

## 2021-04-21 NOTE — H&P
"Trauma Surgery History and Physical    Chief Complaint: Ground level fall.     History of Present Illness: The patient is a 60 year-old White elderly woman with a recent history of stroke who was injured in a fall. The patient suffered a mechanical ground-level fall. The patient had an unknown loss of consciousness. The patient's anticoagulation history cannot be assessed. The patient is unable to articulate any subjective complaints.    Triage Category: The patient was triaged as a Trauma Red activation. An expeditious primary and secondary survey with required adjuncts was conducted. See Trauma Narrator for full details.    Past Medical History:  has no past medical history on file.    Past Surgical History:  has no past surgical history on file.    Allergies:   Allergies   Allergen Reactions   • Codeine Unspecified     On file at pt home pharmacy     • Penicillins Unspecified     On file at pt home pharmacy       Current Medications:    Home Medications     Reviewed by Giovanna Luo (Pharmacy Tech) on 04/21/21 at 1019  Med List Status: Complete   Medication Last Dose Status   apixaban (ELIQUIS) 5mg Tab unk Active   atorvastatin (LIPITOR) 80 MG tablet unk Active   vitamin D, Ergocalciferol, (DRISDOL) 1.25 MG (30187 UT) Cap capsule unk Active              Family History: family history is not on file.    Social History:  Unknown.    Review of Systems: Comprehensive review of systems is not able to be elicited from the patient secondary to the acuity of the clinical situation.    Physical Examination:     Constitutional:     Vital Signs: BP (!) 96/54   Pulse 67   Temp (!) 29.2 °C (84.6 °F) (Core)   Resp 16   Ht 1.6 m (5' 3\")   Wt 40.8 kg (90 lb)   SpO2 100%    General Appearance: The patient is a confused appearing elderly woman in mild distress.  HEENT:    Contusion to the right orbit and nose. The pupils are equal, round, and reactive to light bilaterally. The extraocular muscles are intact " bilaterally. Sclera are icteric. The ear canals and tympanic membranes are clear bilaterally. The nares is partially occluded with blood and secretions. and No septal deviation or hematoma. The midface and jaw are stable.  No malocclusion is evident.  Neck:    The cervical spine is immobilized with a hard collar. The trachea is midline. No significant abrasions, lacerations, contusions, punctures, or swelling. No crepitance. No jugulovenous distension.  Respiratory:   Inspection: Unlabored respirations, no intercostal retractions, paradoxical motion, or accessory muscle use.   Palpation:  The chest is nontender. The clavicles are non deformed bilaterally.   Auscultation: clear to auscultation.  Cardiovascular:   Inspection: The skin is dry and cool.  Auscultation: Regular rate and rhythm.   Peripheral Pulses: Normal.   Abdomen:   Inspection: Abdominal inspection reveals no abrasions, contusions, lacerations or penetrating wounds.   Palpation: Palpation is remarkable for mild generalized tenderness.  Genitourinary:   (FEMALE): normal female external genitalia. Reducible rectal prolapse.  Musculoskeletal:   The pelvis is stable. No significant angulation, deformity, or soft tissue injury involving the upper and lower extremities.  Back:   The thoracolumbar spine was examined utilizing spinal motion restriction. Examination is remarkable for no significant tenderness, swelling, or deformity in the thoracolumbar region.  Skin:    Examination of the skin reveals no significant abrasions, contusion, lacerations, or other soft tissue injury. Jaundice.  Neurologic:    Buxton Coma Scale (GCS) Eye Opening (spontaneous 4), Verbal Response (confused 4), Best Motor Response (obeys commands 6). GCS 14.    Neurologic examination reveals no focal deficits noted, cranial nerves II through XII intact, muscle tone normal, muscle strength normal, sensation is grossly normal.  Psychiatric:   Cannot be assessed.    Laboratory Values:    Recent Labs     04/21/21  0855   WBC 7.9   RBC 3.78*   HEMOGLOBIN 10.7*   HEMATOCRIT 35.1*   MCV 92.9   MCH 28.3   MCHC 30.5*   RDW 57.1*   PLATELETCT 93*   MPV 11.5     Recent Labs     04/21/21  0855   SODIUM 119*   POTASSIUM 4.4   CHLORIDE 86*   CO2 6*   GLUCOSE 188*   BUN 72*   CREATININE 5.00*   CALCIUM 6.7*     Recent Labs     04/21/21  0855   ASTSGOT 2427*   ALTSGPT 2073*   TBILIRUBIN 6.1*   ALKPHOSPHAT 459*   GLOBULIN 2.2   AMMONIA 76*            Imaging:   CT-TSPINE W/O PLUS RECONS   Final Result         Negative CT scan of the thoracic spine without contrast.      CT-LSPINE W/O PLUS RECONS   Final Result      No evidence of fracture of the lumbar spine.      CT-CTA COMPLETE THORACOABDOMINAL AORTA   Final Result      1.  Extensive atherosclerotic change of the aorta and originating vessels. No evidence of aneurysm or dissection.      2.  Very limited evaluation of the abdominal and pelvic organs due to the study being ordered as a CT angiogram. No gross traumatic injury.      3.  Small amount of ascites.      4.  Small bilateral pleural effusions with bibasilar atelectasis and emphysematous change of the lungs.      5.  Small pericardial effusion.      6.  Fatty liver.      7.  Anasarca.      8.  Mild superior endplate compression fractures involving T7 and T8 which are of uncertain duration.            CT-HEAD W/O   Final Result      1.  No evidence of acute intracranial process.      2.  Atrophy.      3.  Chronic ischemic change.      CT-MAXILLOFACIAL W/O PLUS RECONS   Final Result         1.  No evidence of maxillofacial fracture.      2.  Chronic left maxillary, bilateral ethmoid, and sphenoid sinusitis.      3.  Left maxillary sinus nasal antral window.      CT-CSPINE WITHOUT PLUS RECONS   Final Result         1.  Negative CT scan of the cervical spine.  No fracture or subluxation.      2.  Multilevel degenerative change.      DX-PELVIS-1 OR 2 VIEWS   Final Result      No evidence of fracture or  dislocation.      DX-CHEST-LIMITED (1 VIEW)   Final Result      Cardiomegaly.      US-ABDOMEN F.A.S.T. LTD (FOR ED USE ONLY)    (Results Pending)   DX-CHEST-LIMITED (1 VIEW)    (Results Pending)       Assessment and Plan:     Critically ill woman without evidence of acute Trauma.    Disposition: Medical evaluation and admission.  Trauma tertiary survey.       ____________________________________     Seth Osborne M.D.    DD: 4/21/2021  8:59 AM

## 2021-04-21 NOTE — DISCHARGE PLANNING
"Medical Social Work    LSW conducted NOK search to attempt to locate pt's family, as all that has been found so far is a S/O.     NOK possible relatives results as follows:    Keenan Stephen [phone] (No answer,  left requesting callback.)   Leandra Collazo [phone] (Called, individual states \"this isn't Leandra\" and hung up phone.)  Leandra Bender [phone] (No answer,  left requesting callback.)     1310: LSW received callback from Leandra Bender [phone] who states she does not know this pt, nor Keenan Stephen or any other possible relatives.   "

## 2021-04-21 NOTE — ASSESSMENT & PLAN NOTE
Septic versus hemorrhagic versus cardiac versus hypovolemia  Patient is s/p adequate fluid resuscitation  Central line has been placed and we are titrating pressors to MAP goal greater than 65  Antibiotic coverage for GI source given complaint of abdominal pain leading up to her decompensation  Pancultures have been done  CT did not show any acute inflammatory process or drainable abscess  Check cardiac echo

## 2021-04-21 NOTE — ASSESSMENT & PLAN NOTE
? Hypoperfusion vs rhabdo vs hypovolemia  Cont IVF resuscitation  Follow UOP/creat  Check Renal US   Will likely need nephrology consult   Bicarb infusion

## 2021-04-21 NOTE — ED NOTES
Assist RN:   Ruy from Lab called with critical result of Na 119, CO2 6, Ca 6.7, BUN 72, AST 2427, and ALT 2073 at 1029. Critical lab result read back to Ruy.   Dr. Garcia notified of critical lab result at 1029.  Critical lab result read back by Dr. Garcia. Primary RN also aware.

## 2021-04-21 NOTE — FLOWSHEET NOTE
Ventilator Daily Summary    Vent Day # 1  8 @ 23    APVCMV: 20/300/+8/50%    Plan: Continue current ventilator settings and wean mechanical ventilation as tolerated per physician orders.

## 2021-04-21 NOTE — ASSESSMENT & PLAN NOTE
Pt on Eliquis as outpatient  K-centra  Follow Hb/Hct  PPI BID  Octreotide bolus/drip due to history of portal hypertension  May require GI consult  Conservative transfusion protocol for Hb<7

## 2021-04-21 NOTE — PROGRESS NOTES
Unable to obtain consent at this time for blood transfusion. This is an emergent situation. Dr. Norris aware. No family to contact.

## 2021-04-21 NOTE — ASSESSMENT & PLAN NOTE
Follow every hour glucose per hypoglycemia protocol  Starting on dextrose containing drip  Holding p.o. intake given acuity of her illness and GI bleed

## 2021-04-21 NOTE — ASSESSMENT & PLAN NOTE
? Shock liver vs toxic ingestion vs primary hepatic issue  RUQ US pending  Check UDS, tylenol levels  Monitor INR  Ammonia at 77-->monitor closely  Lactulose in next 1-2 days

## 2021-04-21 NOTE — ED NOTES
History of a stroke 1 month ago. Patient on Eliquis.   Found down in her bathroom ALOC. GCS 14, patient confused A&O x 2, Blood pressure in route was 70's/60's  Patient FSBS was 19

## 2021-04-21 NOTE — DISCHARGE PLANNING
Trauma Response    Referral: Trauma Red Response    Intervention: SW responded to trauma red.  Pt was BIB EMS after being found down in her bathroom with an ALOC by her son.  Pt was awake upon arrival.  Pts name is Imani Stephen (: 1960).  SW obtained the following pt information: pt was found down by her son with an ALOC. Per EMS, it did not appear that pt's son would be making his way to the hospital at this time. SOPHIE was able to conduct a chart review on pt, however, sons name and contact not listed. Pt has a significant other listed as her emergency contact: Joni Ruff (significant other) [phone].     Plan: LSW will attempt to see pt once roomed to obtain consent to contact either her son or Joni.

## 2021-04-21 NOTE — RESPIRATORY CARE
Respiratory Trauma Red Note    Intubation NO    Pt maintaining airway, on 2L NC

## 2021-04-21 NOTE — ASSESSMENT & PLAN NOTE
Etiology unclear as is acuity  We will try to raise the patient to the mid 120s, no greater than 125.  Follow every 6 sodiums  Giving sodium bicarb due to her rhabdo  Check urine osmolality and urine sodium  Look forward to specific gravity on UA

## 2021-04-21 NOTE — ASSESSMENT & PLAN NOTE
Etiology uncertain  Right upper quadrant ultrasound pending  Check hepatitis panel  Address hypotension which certainly could be the etiology  Follow daily CMP

## 2021-04-21 NOTE — PROGRESS NOTES
Pt to S108.  [facility] bath and skin assessment completed. Notified Dr. Norris of patient arrival.  disontinued wrist restraints.     2 RN skin check:  -mikel scleral edema, yellow  -dried blood to nares  -dry blood mouth  -abrasion R. ear  -generalized mottling  -bruising and bleeding to R. AC.  -mikel elbows purple.  -mikel knees purple  -mikel Heel purple, cracked. R. Worse than left  -tongue swollen, discolored.  -bleeding prolapsed rectum.  Lexi rectal skin purple.   -L. Ear purple, blanching.  -Coban removed from L. Upper arm, removed.  Red, non blanching  Devices: ETT, holister, central line, BP cuff,   Mepilex to sacrum.  Padded bony prominences with pillows. q 2 hour turns.    2 RN personal belongings check:   -bra  -silver band with clear jems  -silver necklace with cross and ring    Jewelry placed in specimen cup at bedside.

## 2021-04-21 NOTE — CONSULTS
Critical Care Consultation    Date of consult: 4/21/2021    Referring Physician  Thai Garcia DO    Reason for Consultation  Critical care management for multiorgan failure requiring intubation and mechanical ventilation    History of Presenting Illness  All history was obtained from old records as well as chart notes from the emergency department as the patient was intubated no family was at bedside to obtain further history.    This is a 60-year-old female with a past medical history significant for anxiety, depression, Crohn's disease, meth abuse, tobacco abuse, systolic CHF with EF of 25% and recent left sided MCA stroke that resulted in right hemiplegia and aphasia status post TPA and IR mechanical thrombectomy on 2/20/2021 on [facility] who apparently was last seen in her normal state last evening by her son.  However, this morning the son found the patient face down and unresponsive.  EMS was activated and found that she had an initial GCS of 14, but confused.  She was hypothermic with a core temperature of 81.7 °F.  The patient was also hypotensive with systolic blood pressures in the 60s to 70s with an initial blood glucose of 14.  Patient was given an amp of D50 as well as resuscitative fluids and brought to [facility].  The patient was initially brought in as a trauma and underwent primary surveillance which found no obvious traumatic injuries.  During the patient's evaluation in the ER the patient had continued confusion and rapid deterioration with unable to protect her airways requiring emergent intubation.  The patient had a central line placed as well as an orogastric tube which revealed dark red gastric contents concerning for a GI bleed.  The patient was placed on a D5 bicarbonate drip due to severe acidosis as well as acute renal failure.    The patient will be admitted to the ICU for multiorgan failure and need for mechanical ventilation.    Code Status  FULL  CODE    Review of Systems  Review of Systems   Unable to perform ROS: Intubated       Past Medical History   has a past medical history of Allergy, Arthritis, Depression (3/17/2010), Headache(784.0), Hyperlipidemia, Other and unspecified hyperlipidemia (4/20/2010), Renal cell carcinoma (10/21/2011), Renal mass, Unilateral paralysis due to acute cerebrovascular accident (CVA) (HCC) (2/20/2021), and Unspecified vitamin D deficiency (4/20/2010).    Surgical History   has a past surgical history that includes other abdominal surgery; primary c section; abdominal exploration (1975); tubal coagulation laparoscopic bilateral; and recovery (10/4/2012).    Family History  family history includes Cancer in her sister; Heart Disease in her sister; Hypertension in her father and mother; Other in her daughter.      Social History    reports that she has been smoking cigarettes. She has a 17.50 pack-year smoking history. She has never used smokeless tobacco. She reports that she does not drink alcohol and does not use drugs.    Medications  Home Medications     Reviewed by Giovanna Luo (Pharmacy Tech) on 04/21/21 at 1019  Med List Status: Complete   Medication Last Dose Status   apixaban (ELIQUIS) 5mg Tab unk Active   atorvastatin (LIPITOR) 80 MG tablet unk Active   vitamin D, Ergocalciferol, (DRISDOL) 1.25 MG (04412 UT) Cap capsule unk Active              Current Facility-Administered Medications   Medication Dose Route Frequency Provider Last Rate Last Admin   • KETAMINE HCL 50 MG/ML INJ SOLN            • metroNIDAZOLE (FLAGYL) IVPB 500 mg  500 mg Intravenous Once SEGUN RahmanOJavier 100 mL/hr at 04/21/21 1100 500 mg at 04/21/21 1100   • sodium bicarbonate 150 mEq in D5W infusion (premix)   Intravenous Once Thai Garcia D.O.       • Respiratory Therapy Consult   Nebulization Continuous RT Nola Norris M.D.       • famotidine (PEPCID) tablet 20 mg  20 mg Enteral Tube Q12HRS Nola Norris M.D.         Or   • famotidine (PEPCID) injection 20 mg  20 mg Intravenous Q12HRS Nola Norris M.D.       • senna-docusate (PERICOLACE or SENOKOT S) 8.6-50 MG per tablet 2 tablet  2 tablet Enteral Tube BID Nola Norris M.D.        And   • polyethylene glycol/lytes (MIRALAX) PACKET 1 Packet  1 Packet Enteral Tube QDAY PRN Nola Norris M.D.        And   • magnesium hydroxide (MILK OF MAGNESIA) suspension 30 mL  30 mL Enteral Tube QDAY PRN Nola Norris M.D.        And   • bisacodyl (DULCOLAX) suppository 10 mg  10 mg Rectal QDAY PRN Nola Norris M.D.       • MD Alert...ICU Electrolyte Replacement per Pharmacy   Other PHARMACY TO DOSE Nola Norris M.D.       • lidocaine (XYLOCAINE) 1 % injection 1-2 mL  1-2 mL Tracheal Tube Q30 MIN PRN Nola Norris M.D.       • fentaNYL (SUBLIMAZE) injection 50 mcg  50 mcg Intravenous Q15 MIN PRN Nola Norris M.D.        And   • fentaNYL (SUBLIMAZE) injection 100 mcg  100 mcg Intravenous Q15 MIN PRN Nola Norris M.D.        And   • fentaNYL (SUBLIMAZE) 50 mcg/mL in 50mL (Continuous Infusion)   Intravenous Continuous Nola Norris M.D.        And   • dexmedetomidine (PRECEDEX) 400 mcg/100mL NS premix infusion  0-0.7 mcg/kg/hr Intravenous Continuous Nola Norris M.D.       • ipratropium-albuterol (DUONEB) nebulizer solution  3 mL Nebulization Q2HRS PRN (RT) Nola Norris M.D.       • sodium bicarbonate 150 mEq in D5W infusion (premix)   Intravenous Continuous Nola Norris M.D.       • [START ON 4/22/2021] cefTRIAXone (ROCEPHIN) 1 g in  mL IVPB  1 g Intravenous Q24HRS Nola Norris M.D.       • metroNIDAZOLE (FLAGYL) IVPB 500 mg  500 mg Intravenous Q8HRS Nola Norris M.D.         Current Outpatient Medications   Medication Sig Dispense Refill   • atorvastatin (LIPITOR) 80 MG tablet Take 80 mg by mouth every day.     • apixaban (ELIQUIS) 5mg Tab Take 5 mg by mouth 2 times a day.     • vitamin D, Ergocalciferol, (DRISDOL)  1.25 MG (77105 UT) Cap capsule Take 50,000 Units by mouth every 7 days.         Allergies  Allergies   Allergen Reactions   • Codeine Unspecified     On file at pt home pharmacy     • Penicillins Unspecified     On file at pt home pharmacy       Vital Signs last 24 hours  Temp:  [27.6 °C (81.7 °F)-29.2 °C (84.6 °F)] 29.2 °C (84.6 °F)  Pulse:  [57-67] 63  Resp:  [15-35] 20  BP: ()/(0-81) 96/52  SpO2:  [79 %-100 %] 98 %    Physical Exam  Physical Exam  Vitals and nursing note reviewed.   Constitutional:       Appearance: She is ill-appearing and toxic-appearing.      Comments: Cachetic, appears much older than stated age, chronically ill   HENT:      Head: Normocephalic and atraumatic.      Right Ear: External ear normal.      Left Ear: External ear normal.      Nose: Congestion present.      Comments: Dried blood inside and out of bilateral nares, mildly swollen nasal bridge      Mouth/Throat:      Mouth: Mucous membranes are dry.      Comments: ETT in place, OG in place with maroon gastric contents  Eyes:      General: Scleral icterus present.      Comments: Sluggish pupillary response =, scleral edema noted   Cardiovascular:      Rate and Rhythm: Normal rate and regular rhythm.   Pulmonary:      Breath sounds: No wheezing.      Comments: Breathing with the ventilator, diminished bases, clear anteriorly, no crepitus  Chest:      Chest wall: No tenderness.   Abdominal:      Palpations: Abdomen is soft.      Tenderness: There is no abdominal tenderness. There is no guarding or rebound.   Musculoskeletal:      Cervical back: Normal range of motion and neck supple.      Right lower leg: No edema.      Left lower leg: No edema.   Lymphadenopathy:      Cervical: No cervical adenopathy.   Skin:     General: Skin is dry.      Capillary Refill: Capillary refill takes more than 3 seconds.      Coloration: Skin is jaundiced.      Findings: Bruising present.      Comments: Cool distal extremities, mottling to extremities,  multiple bruises throughout extremities   Neurological:      Comments: No response to painful stimuli, (+)cough   Psychiatric:      Comments: Unable to assess         Fluids    Intake/Output Summary (Last 24 hours) at 4/21/2021 1140  Last data filed at 4/21/2021 1116  Gross per 24 hour   Intake 2574 ml   Output 0 ml   Net 2574 ml       Laboratory  Recent Results (from the past 48 hour(s))   AMMONIA    Collection Time: 04/21/21  8:55 AM   Result Value Ref Range    Ammonia 76 (H) 11 - 45 umol/L   LACTIC ACID    Collection Time: 04/21/21  8:55 AM   Result Value Ref Range    Lactic Acid 14.6 (HH) 0.5 - 2.0 mmol/L   DIAGNOSTIC ALCOHOL    Collection Time: 04/21/21  8:55 AM   Result Value Ref Range    Diagnostic Alcohol <10.1 0.0 - 10.0 mg/dL   CBC WITHOUT DIFFERENTIAL    Collection Time: 04/21/21  8:55 AM   Result Value Ref Range    WBC 7.9 4.8 - 10.8 K/uL    RBC 3.78 (L) 4.20 - 5.40 M/uL    Hemoglobin 10.7 (L) 12.0 - 16.0 g/dL    Hematocrit 35.1 (L) 37.0 - 47.0 %    MCV 92.9 81.4 - 97.8 fL    MCH 28.3 27.0 - 33.0 pg    MCHC 30.5 (L) 33.6 - 35.0 g/dL    RDW 57.1 (H) 35.9 - 50.0 fL    Platelet Count 93 (L) 164 - 446 K/uL    MPV 11.5 9.0 - 12.9 fL   Comp Metabolic Panel    Collection Time: 04/21/21  8:55 AM   Result Value Ref Range    Sodium 119 (LL) 135 - 145 mmol/L    Potassium 4.4 3.6 - 5.5 mmol/L    Chloride 86 (L) 96 - 112 mmol/L    Co2 6 (LL) 20 - 33 mmol/L    Anion Gap 27.0 (H) 7.0 - 16.0    Glucose 188 (H) 65 - 99 mg/dL    Bun 72 (HH) 8 - 22 mg/dL    Creatinine 5.00 (H) 0.50 - 1.40 mg/dL    Calcium 6.7 (LL) 8.5 - 10.5 mg/dL    AST(SGOT) 2427 (HH) 12 - 45 U/L    ALT(SGPT) 2073 (HH) 2 - 50 U/L    Alkaline Phosphatase 459 (H) 30 - 99 U/L    Total Bilirubin 6.1 (H) 0.1 - 1.5 mg/dL    Albumin 1.8 (L) 3.2 - 4.9 g/dL    Total Protein 4.0 (L) 6.0 - 8.2 g/dL    Globulin 2.2 1.9 - 3.5 g/dL    A-G Ratio 0.8 g/dL   HCG QUAL SERUM    Collection Time: 04/21/21  8:55 AM   Result Value Ref Range    Beta-Hcg Qualitative Serum  Negative Negative   ESTIMATED GFR    Collection Time: 04/21/21  8:55 AM   Result Value Ref Range    GFR If  11 (A) >60 mL/min/1.73 m 2    GFR If Non African American 9 (A) >60 mL/min/1.73 m 2   POCT glucose device results    Collection Time: 04/21/21  8:56 AM   Result Value Ref Range    Glucose - Accu-Ck 19 (LL) 65 - 99 mg/dL   POCT glucose device results    Collection Time: 04/21/21  9:08 AM   Result Value Ref Range    Glucose - Accu-Ck 260 (H) 65 - 99 mg/dL   SARS-COV Antigen AUGUST: Collect dry nasal swab AND NP swab in VTM    Collection Time: 04/21/21  9:13 AM   Result Value Ref Range    SARS-CoV-2 Source Nasal Swab    SARS-CoV-2 PCR (24 hour In-House): Collect NP swab in VTM    Collection Time: 04/21/21  9:13 AM    Specimen: Nasopharyngeal; Respirate   Result Value Ref Range    SARS-CoV-2 Source NP Swab    CREATINE KINASE    Collection Time: 04/21/21  9:52 AM   Result Value Ref Range    CPK Total 1947 (HH) 0 - 154 U/L   MAGNESIUM    Collection Time: 04/21/21  9:52 AM   Result Value Ref Range    Magnesium 2.8 (H) 1.5 - 2.5 mg/dL   PHOSPHORUS    Collection Time: 04/21/21  9:52 AM   Result Value Ref Range    Phosphorus 8.0 (H) 2.5 - 4.5 mg/dL   TROPONIN    Collection Time: 04/21/21  9:52 AM   Result Value Ref Range    Troponin T 187 (H) 6 - 19 ng/L   COMP METABOLIC PANEL    Collection Time: 04/21/21  9:52 AM   Result Value Ref Range    Sodium 119 (LL) 135 - 145 mmol/L    Potassium 4.3 3.6 - 5.5 mmol/L    Chloride 87 (L) 96 - 112 mmol/L    Co2 5 (LL) 20 - 33 mmol/L    Anion Gap 27.0 (H) 7.0 - 16.0    Glucose 137 (H) 65 - 99 mg/dL    Bun 70 (H) 8 - 22 mg/dL    Creatinine 5.10 (HH) 0.50 - 1.40 mg/dL    Calcium 6.3 (LL) 8.5 - 10.5 mg/dL    AST(SGOT) 2038 (HH) 12 - 45 U/L    ALT(SGPT) 1713 (HH) 2 - 50 U/L    Alkaline Phosphatase 377 (H) 30 - 99 U/L    Total Bilirubin 4.9 (H) 0.1 - 1.5 mg/dL    Albumin 1.5 (L) 3.2 - 4.9 g/dL    Total Protein 3.2 (L) 6.0 - 8.2 g/dL    Globulin 1.7 (L) 1.9 - 3.5 g/dL    A-G  Ratio 0.9 g/dL   DIAGNOSTIC ALCOHOL    Collection Time: 04/21/21  9:52 AM   Result Value Ref Range    Diagnostic Alcohol <10.1 0.0 - 10.0 mg/dL   Prothrombin Time    Collection Time: 04/21/21  9:52 AM   Result Value Ref Range    PT >120.0 (HH) 12.0 - 14.6 sec    INR >=10.00 (HH) 0.87 - 1.13   APTT    Collection Time: 04/21/21  9:52 AM   Result Value Ref Range    APTT 201.8 (HH) 24.7 - 36.0 sec   DRUGS OF ABUSE SERUM    Collection Time: 04/21/21  9:52 AM   Result Value Ref Range    Acetaminophen -Tylenol <5 (L) 10 - 30 ug/mL    Salicylates, Quant. 1 (L) 15 - 25 mg/dL   COD - Adult (Type and Screen)    Collection Time: 04/21/21  9:52 AM   Result Value Ref Range    ABO Grouping Only A     Rh Grouping Only POS     Antibody Screen-Cod NEG    ABO Rh Confirm    Collection Time: 04/21/21  9:52 AM   Result Value Ref Range    ABO Rh Confirm A POS    ESTIMATED GFR    Collection Time: 04/21/21  9:52 AM   Result Value Ref Range    GFR If African American 10 (A) >60 mL/min/1.73 m 2    GFR If Non African American 9 (A) >60 mL/min/1.73 m 2   SARS-COV Antigen AUGUST: Collect dry nasal swab AND NP swab in VTM    Collection Time: 04/21/21  9:58 AM   Result Value Ref Range    SARS-CoV-2 Source Nasal Swab     SARS-COV ANTIGEN AUGUST NotDetected Not-Detected   POCT glucose device results    Collection Time: 04/21/21 11:14 AM   Result Value Ref Range    Glucose - Accu-Ck 72 65 - 99 mg/dL       Imaging  CT T-spine:  Negative CT scan of the thoracic spine without contrast.     CT L-spine:  No evidence of fracture of the lumbar spine.     CTA abd:  1.  Extensive atherosclerotic change of the aorta and originating vessels. No evidence of aneurysm or dissection.  2.  Very limited evaluation of the abdominal and pelvic organs due to the study being ordered as a CT angiogram. No gross traumatic injury.  3.  Small amount of ascites.  4.  Small bilateral pleural effusions with bibasilar atelectasis and emphysematous change of the lungs.  5.  Small  pericardial effusion.  6.  Fatty liver.  7.  Anasarca.  8.  Mild superior endplate compression fractures involving T7 and T8 which are of uncertain duration.    CT max/face:  1. No evidence of maxillofacial fracture. 2. Chronic left maxillary, bilateral ethmoid, and sphenoid sinusitis. 3. Left maxillary sinus nasal antral window.     CT C-spine:  1. Negative CT scan of the cervical spine. No fracture or subluxation. 2. Multilevel degenerative change.    CT head:  1. No evidence of acute intracranial process. 2. Atrophy. 3. Chronic ischemic change.     CXR(reviewed): hyperinflated, cardiomegaly    Assessment/Plan  Severe protein-calorie malnutrition (HCC)- (present on admission)  Assessment & Plan  Cachetic in appearance, muscle wasting  Albumin of 1.5      Metabolic acidosis, increased anion gap- (present on admission)  Assessment & Plan  Multifactorial:  HOLLIE, elevated liver enzymes, lactic acidosis  Monitor BMP  Bicarb drip    Acute encephalopathy- (present on admission)  Assessment & Plan  Likely metabolic from hypoglycemia and severe metabolic derangements:  HOLLIE, transamitis, acidosis  CT head without pathology  UDS, salicylate and acetaminophen pending  Cont to correct underlying metabolic derangements    Transaminitis- (present on admission)  Assessment & Plan  ? Shock liver vs toxic ingestion vs primary hepatic issue  RUQ US pending  Check UDS, tylenol levels  Monitor INR  Ammonia at 77-->monitor closely  Lactulose in next 1-2 days    Acute respiratory failure with hypoxia (HCC)- (present on admission)  Assessment & Plan  Intubated in ER on 4/21 for airway protection/hypoxia  Cont full vent support  RT/O2 protocols  Cont vent bundle protocols  SAT/SBTs when appropriate      GI bleed  Assessment & Plan  Pt on Eliquis as outpatient  K-centra  Follow Hb/Hct  PPI BID  Octreotide bolus/drip due to history of portal hypertension  May require GI consult  Conservative transfusion protocol for  Hb<7    Hypothermia  Assessment & Plan  Cont supportive warming protocols    Closed fracture of seventh thoracic vertebra (HCC)  Assessment & Plan  Noted on CT thoracic spine   Pain management     Hypoglycemia  Assessment & Plan  Likely due to shock liver/transminitis  D5 infusion  Hourly glucose checks    Hypocalcemia  Assessment & Plan  Follow iCa and replete as needed    HOLLIE (acute kidney injury) (HCC)  Assessment & Plan  ? Hypoperfusion vs rhabdo vs hypovolemia  Cont IVF resuscitation  Follow UOP/creat  Check Renal US   Will likely need nephrology consult   Bicarb infusion    Hyponatremia  Assessment & Plan  Monitor closely  Correct to only 8-10 meq in the next 24 hours    Non-traumatic rhabdomyolysis  Assessment & Plan  ? Down time  Cont IVF resuscitation    Thrombocytopenia (HCC)  Assessment & Plan  Following   Mild       Discussed patient condition and risk of morbidity and/or mortality with Hospitalist, RN, RT and Pharmacy.      The patient remains critically ill with ongoing vasopressor titration, frequent glucose checks due to hypoglycemia, severe hypothermia, acute kidney failure, transaminitis, acute encephalopathy, and respiratory failure requiring mechanical ventilation the patient is in multiorgan failure and has high chance of not surviving the evening.  At this point in time based on the patient's multiple comorbidities including severe protein calorie malnutrition and recent stroke having the patient undergo CPR is not beneficial therapy at this point.  The medical team has been attempting to get a hold of family and unable to do so.  At this time will make the patient a DNR..      Critical care time = 85 minutes in directly providing and coordinating critical care and extensive data review.  No time overlap and excludes procedures.

## 2021-04-21 NOTE — PROGRESS NOTES
After pt settled into the ICU I informed Dr. Norris of updates such as:   · Lab value updates (sodium / hgb)  · Pt is bleeding from most orifices  · Max rate on levophed   · Pupils 5mm non reactive to light   · Pt not moving anything / restraints removed  · Updated on SW findings - no family with NOK search. Boyfriend of unknown time frame identified and SW to add his phone number  · Redraw of wet COVID swab    Orders for:   ·  2 units RBC  · 1 unit FFP  · D5 bolus   · More vasopressors

## 2021-04-21 NOTE — DISCHARGE PLANNING
Medical Social Work    LSW called pt's significant other, Joni Ruff [phone]. Joni shares that he was actually the one to find her in the bathroom and make the call to EMS this morning.     LSW inquired about any possible immediate family of pt. Joni shares pt has a daughter, Tena Stephen (although he reports he is not sure if that is her real name), however, they are estranged and he does not know her phone number or where she resides. Pt also reportedly has siblings, but he does not know their names or phone numbers. Pt has a phone with no contacts in it. Joni shares that he will attempt to locate any family, as he understands only minimal updates can be given until relatives/NOK is found. Provided Joni with SW [phone] for any new information he might find. DAYDAY Garcia updated, KRYSTAL Nielson updated.

## 2021-04-21 NOTE — ASSESSMENT & PLAN NOTE
Likely metabolic from hypoglycemia and severe metabolic derangements:  HOLLIE, transamitis, acidosis  CT head without pathology  UDS, salicylate and acetaminophen pending  Cont to correct underlying metabolic derangements

## 2021-04-21 NOTE — ED NOTES
Temp ulloa in place but pt does not have enough adequate urine output to send urine sample at this time

## 2021-04-22 ENCOUNTER — APPOINTMENT (OUTPATIENT)
Dept: RADIOLOGY | Facility: MEDICAL CENTER | Age: 61
DRG: 208 | End: 2021-04-22
Attending: INTERNAL MEDICINE
Payer: COMMERCIAL

## 2021-04-22 VITALS
OXYGEN SATURATION: 62 % | DIASTOLIC BLOOD PRESSURE: 50 MMHG | TEMPERATURE: 86 F | SYSTOLIC BLOOD PRESSURE: 79 MMHG | BODY MASS INDEX: 22.46 KG/M2 | WEIGHT: 126.76 LBS | HEIGHT: 63 IN

## 2021-04-22 LAB
ANION GAP SERPL CALC-SCNC: 21 MMOL/L (ref 7–16)
BUN SERPL-MCNC: 56 MG/DL (ref 8–22)
CA-I SERPL-SCNC: 0.8 MMOL/L (ref 1.1–1.3)
CALCIUM SERPL-MCNC: 6.7 MG/DL (ref 8.5–10.5)
CHLORIDE SERPL-SCNC: 91 MMOL/L (ref 96–112)
CO2 SERPL-SCNC: 12 MMOL/L (ref 20–33)
CREAT SERPL-MCNC: 4.28 MG/DL (ref 0.5–1.4)
GLUCOSE SERPL-MCNC: 291 MG/DL (ref 65–99)
LACTATE BLD-SCNC: 12.7 MMOL/L (ref 0.5–2)
POTASSIUM SERPL-SCNC: 6.6 MMOL/L (ref 3.6–5.5)
SODIUM SERPL-SCNC: 124 MMOL/L (ref 135–145)

## 2021-04-22 PROCEDURE — 80048 BASIC METABOLIC PNL TOTAL CA: CPT

## 2021-04-22 PROCEDURE — 4A133B1 MONITORING OF ARTERIAL PRESSURE, PERIPHERAL, PERCUTANEOUS APPROACH: ICD-10-PCS | Performed by: INTERNAL MEDICINE

## 2021-04-22 PROCEDURE — 4A133J1 MONITORING OF ARTERIAL PULSE, PERIPHERAL, PERCUTANEOUS APPROACH: ICD-10-PCS | Performed by: INTERNAL MEDICINE

## 2021-04-22 PROCEDURE — C9113 INJ PANTOPRAZOLE SODIUM, VIA: HCPCS | Performed by: INTERNAL MEDICINE

## 2021-04-22 PROCEDURE — 03HY32Z INSERTION OF MONITORING DEVICE INTO UPPER ARTERY, PERCUTANEOUS APPROACH: ICD-10-PCS | Performed by: INTERNAL MEDICINE

## 2021-04-22 PROCEDURE — 700111 HCHG RX REV CODE 636 W/ 250 OVERRIDE (IP): Performed by: INTERNAL MEDICINE

## 2021-04-22 PROCEDURE — 82330 ASSAY OF CALCIUM: CPT

## 2021-04-22 PROCEDURE — 83605 ASSAY OF LACTIC ACID: CPT

## 2021-04-22 PROCEDURE — 36620 INSERTION CATHETER ARTERY: CPT | Performed by: INTERNAL MEDICINE

## 2021-04-22 PROCEDURE — 36620 INSERTION CATHETER ARTERY: CPT

## 2021-04-22 PROCEDURE — 700105 HCHG RX REV CODE 258: Performed by: INTERNAL MEDICINE

## 2021-04-22 RX ADMIN — SODIUM CHLORIDE 8 MG/HR: 9 INJECTION, SOLUTION INTRAVENOUS at 01:11

## 2021-04-22 RX ADMIN — PHENYLEPHRINE HYDROCHLORIDE 300 MCG/MIN: 10 INJECTION INTRAVENOUS at 01:07

## 2021-04-22 ASSESSMENT — PAIN DESCRIPTION - PAIN TYPE: TYPE: ACUTE PAIN;CHRONIC PAIN

## 2021-04-22 NOTE — PROGRESS NOTES
Summary:   · Pt received 2 FFP, 2 RBC  · Redraw labs: LA, CMP, CBC, TEG    Sarah from Lab called with critical result of LA of 10.8 at 1752. Critical lab result read back to Sarah.   Dr. Patino notified of critical lab result at 1753 - awaiting his read and will follow up.

## 2021-04-22 NOTE — CARE PLAN
Problem: Communication  Goal: The ability to communicate needs accurately and effectively will improve  Outcome: PROGRESSING SLOWER THAN EXPECTED  Note: Pt is currently unresponsive. Monitoring VS and patient for potential signs/symptoms of discomfort and pain. Medicating per MAR and implementing non-pharm measures to reduce patient discomfort.     Problem: Knowledge Deficit  Goal: Knowledge of disease process/condition, treatment plan, diagnostic tests, and medications will improve  Outcome: PROGRESSING SLOWER THAN EXPECTED  Goal: Knowledge of the prescribed therapeutic regimen will improve  Outcome: PROGRESSING SLOWER THAN EXPECTED     Problem: Respiratory:  Goal: Respiratory status will improve  Outcome: PROGRESSING SLOWER THAN EXPECTED     Problem: Safety  Goal: Will remain free from injury  Outcome: PROGRESSING AS EXPECTED  Goal: Will remain free from falls  Outcome: PROGRESSING AS EXPECTED     Problem: Infection  Goal: Will remain free from infection  Outcome: PROGRESSING AS EXPECTED     Problem: Venous Thromboembolism (VTW)/Deep Vein Thrombosis (DVT) Prevention:  Goal: Patient will participate in Venous Thrombosis (VTE)/Deep Vein Thrombosis (DVT)Prevention Measures  Outcome: PROGRESSING AS EXPECTED     Problem: Skin Integrity  Goal: Risk for impaired skin integrity will decrease  Outcome: PROGRESSING AS EXPECTED  Note: Repositioning patient and devices Q 2 hours and assessing for linen change needs.

## 2021-04-22 NOTE — PROGRESS NOTES
Dr. Chiang at bedside for Arterial Line Placement.  Time out called [phone].  0037 Procedure complete.

## 2021-04-22 NOTE — PROCEDURES
Date of service:  4/22/2021    Title:  Arterial catheter placement - axillary artery    Indication:  Shock    Narrative:    A time out was performed identifying the correct patient, correct procedure and correct location for this procedure prior to performing this procedure.    The left arm was prepped with chlorhexidine and draped in the usual sterile fashion.  A 20 gauge arterial catheter was placed into the left axillary artery under ultrasound guidance using the technique described by Ha without difficulty or apparent complication.  The line was sutured into place and a sterile dressing was placed over the line.  An outstanding arterial waveform is present on the monitor.  The patient tolerated the procedure quite nicely.  No complications are apparent.      Tad Chiang MD  Pulmonary and Critical Care Medicine

## 2021-04-22 NOTE — PROGRESS NOTES
Dr. Chiang notified of BP 49/39 (43) Pt on Vasopressin 0.03 units/min, Levophed 50mcg/min maxed out per order. Phenylephrine gtt ordered. Gtt started at 300mcg/min with little improvement in pressures, MD notified.

## 2021-04-22 NOTE — PROGRESS NOTES
Ryan from Lab called with critical result of Co2=8 at 1955. Critical lab result read back to Ryan.   Dr. Patino previously notified of critical ABG results.

## 2021-04-22 NOTE — CONSULTS
DATE OF SERVICE:  04/21/2021     GASTROENTEROLOGY CONSULTATION     REQUESTING PHYSICIAN:  Antoine Brennan DO     REASON FOR REQUEST:  Anemia.     HISTORY OF PRESENT ILLNESS:  The patient is a 60-year-old female with apparent   history of a stroke, placed on Eliquis a month ago, brought in now by family   member and dropped off after being found down reportedly for assumed about 12   hours in the bathroom.  Apparently, she had been having some abdominal pain.    Her mentation continued to deteriorate in the ER and she was intubated.  She   was noted to have rectal prolapse that was reduced by the emergency room   physician and there was some bright red blood in the perianal area.  In   addition, OG tube was placed and there was some coffee-ground like material on   lavage.  The patient is currently intubated, unable to give any history.    There are no family members available to discuss anything with.  So the   history is primarily gleaned from review of notes as well as discussion with   nursing staff.  She was also found, while down, to have undetectable blood   glucose levels at one point.  She is currently noted to be in florid renal   failure with marked elevated liver enzymes on full pressor support, still   hypotensive, making no urine.  There has been no report of abdominal pain, no   nausea or vomiting.  Nursing reports no large amounts of bright red blood   coming out per rectum, in fact just small amounts on the _____ in the bed.     REVIEW OF SYSTEMS:  Not possible.     PAST MEDICAL HISTORY:  Stroke and apparently thyroid nodules as well as drug   abuse.  Hyperlipidemia.     MEDICATIONS:  Eliquis and atorvastatin.     ALLERGIES:  CODEINE AND PENICILLIN.     PAST SURGICAL HISTORY:  Unknown.     SOCIAL HISTORY:.  Unknown tobacco or alcohol use although there is report of   methamphetamine abuse.     FAMILY MEDICAL HISTORY:  Unknown.     PHYSICAL EXAMINATION:  VITAL SIGNS:  Blood pressure 108/55  with a heart rate 73, respiratory rate of   20, on a ventilator.  GENERAL:  She is a jaundiced-appearing, very edematous-appearing female,   intubated.  HEENT:  Reveals that there is no pupillary constriction with light exposure.    There are no extraocular movements at this point. Oral exam difficult given   intubation and edema.  CARDIOVASCULAR:  Regular rate and rhythm without murmurs.  LUNGS:  Clear to auscultation except for some upper airway rhonchi bilaterally   in the anterior lung fields.  ABDOMEN:  Appears soft although the patient is sedated.  No obvious   distention.  No organomegaly appreciated or masses.  RECTAL:  Reveals decreased tone.  There is a small amount of bright red blood   on gloved finger.  No masses noted.  EXTREMITIES:  Evaluation reveals bilateral pitting edema.  SKIN:  Reveals jaundice and anasarca.     LABORATORY DATA:  CBC reveals white blood cell count 7.9, hemoglobin 10.7,   hematocrit 35, platelet count 93.  BMP reveals sodium 119, potassium 4.4,   chloride 86, bicarbonate of 6, BUN 72, creatinine 5.0.  Liver enzymes show an   AST 2427, ALT 2073, alkaline phosphatase 459, total bilirubin 6.1.  He has   elevated troponins, negative COVID, negative Tylenol level, and her INR is   greater than 10 with all parameters on her PEG abnormal.     DIAGNOSTIC DATA:  CT angiogram reveals small amount of ascites, bilateral   pleural effusion, pericardial effusions, fatty change of the liver.  No   mention of cirrhotic change. Anasarca.  Ultrasound reveals fatty liver change,   sludge and gallstones, no common bile duct dilation.  There is some ascites.     IMPRESSION/PLAN/MEDICAL DECISION MAKIN.  Anemia.  The patient is quite anemic.  She is receiving 2 units of packed   red blood cells at this time and appears to be oozing from multiple different   orifices including her mouth, rectum, around IV sites.  Certainly could be   some form of GI bleeding with report of coffee grounds on lavage  and scant   hematochezia although at this point her outlook looks extremely grim with   multisystem failure and I would not plan for any endoscopies at this point,   especially without any evidence of a large volume bleeding event.  We will   continue to monitor with you.  I would continue the IV PPI and octreotide   empirically as you are doing.  2.  Hematochezia.  See discussion above.  3.  Abnormal liver enzymes, most likely all secondary to shock liver.  I would   hold off given extremely poor near-term prognosis on doing a parenchymal   liver workup as this is likely acute and likely secondary to shock liver,   although a parenchymal liver workup can be performed if she begins to make a   recovery.  4.  Hyponatremia, severe.  5.  Acute renal failure, anuric, likely also related to severe hypotension.  6.  Elevated troponins.        ______________________________  MD BILL GODWIN/INDIANA/MICHAEL    DD:  04/21/2021 19:26  DT:  04/21/2021 21:08    Job#:  [id number]

## 2021-04-22 NOTE — DISCHARGE SUMMARY
Death Summary    Cause of Death  Multisystem organ failure with refractory shock due to undetermined etiology.    Comorbid Conditions at the Time of Death  Active Problems:    Acute respiratory failure with hypoxia (HCC) POA: Yes    Transaminitis POA: Yes    Acute encephalopathy POA: Yes    Metabolic acidosis, increased anion gap POA: Yes    Severe protein-calorie malnutrition (HCC) POA: Yes    Altered mental status POA: Unknown    Thrombocytopenia (HCC) POA: Unknown    Non-traumatic rhabdomyolysis POA: Unknown    Hyponatremia POA: Unknown    HOLLIE (acute kidney injury) (HCC) POA: Unknown    Hypocalcemia POA: Unknown    Hypoglycemia POA: Unknown    Closed fracture of seventh thoracic vertebra (HCC) POA: Unknown    Hypothermia POA: Unknown    GI bleed POA: Unknown    Rectal prolapse POA: Unknown    Shock (HCC) POA: Unknown  Resolved Problems:    * No resolved hospital problems. *      History of Presenting Illness and Hospital Course  This is a 60 y.o. female admitted 2021 with hypothermia with a core temperature of 81.7, shock, hypoglycemia with a blood glucose of 14 and multisystem organ failure.  A trauma survey did not reveal evidence of any acute traumatic injuries.  She was intubated and a central venous catheter was placed.  She was aggressively treated with IV fluid resuscitation, full mechanical ventilatory support and vasopressor support.  She had evidence of a gastrointestinal hemorrhage.  Her hemoglobin was 10.7 on presentation.  Her creatinine was 5.1 on presentation with a bicarbonate of 5.  Her aminotransferases were elevated consistent with ischemic hepatopathy and shock liver.  She was placed on a bicarbonate drip.  She had a profound coagulopathy.  She was transfused packed red blood cells and fresh frozen plasma.  Despite all aggressive therapies, she continued to deteriorate and ultimately .    Death Date: 21   Death Time: 020         Pronounced By (RN1): Keith Bhatti  By (RN2): Melita Chiang MD  Pulmonary and Critical Care Medicine

## 2021-04-22 NOTE — PROGRESS NOTES
Security at bedside to  patient belongings. Security has patient's bra, jewelry (ring and necklace), and drivers licence

## 2021-04-22 NOTE — PROGRESS NOTES
2 RN skin check completed with Keith NAYLOR                Preventative measures in place:  Q 2 hour turns, pillows in place for support and cushioning, skin assessed under bony prominences and high pressure point areas, heels lifted and assessed underneath, heels floated, ensuring medical devices/tubing are not under patient, repositioning medical equipment q 2 hours, pressure redistribution mattress, containment devices in use.     Following areas of concern with interventions provided:   - Nose/mouth - bleeding, suction and wiping with wash cloths performed.  - ETT - intact, moderate, bloody oral secretions (repositioning with turns q 2 hour)  - Eyes -  Edematous, yellowed sclera, left eye remains slightly open (assessing eyes, ensuring they remain closed to prevent drying.  - generalized mottling, edema, cyanosis to the extremities with cap refill >3 seconds.  - bilateral upper/lower extremities - scattered bruises and scabbing

## 2021-04-22 NOTE — PROGRESS NOTES
Critical care -     This 60-year-old female with HFrEF, EF 25% and a recent left MCA stroke was found down by her son this morning at home, unknown downtime initial GCS 14, confused, hypothermic 81.7 °F and hypertensive.  She is currently multiorgan failure with marked acidemia.    She remains on high-dose vasopressors and I have increased dose maxes.  She remains markedly acidemic, ABGs.  pH of 7.03 PCO2 30 on full ventilator support.  I increased minute ventilation.  Increased sodium bicarbonate infusion and gave additional bicarbonate pushes.  Given calcium chloride and will follow repeat labs.  I added stress dose hydrocortisone.    Overall prognosis is extremely poor.   trying to locate family with no success so far.

## 2021-04-24 LAB
BACTERIA BLD CULT: ABNORMAL
BACTERIA BLD CULT: ABNORMAL
SIGNIFICANT IND 70042: ABNORMAL
SITE SITE: ABNORMAL
SOURCE SOURCE: ABNORMAL

## 2021-04-27 LAB
AMOBARBITAL SERPL-MCNC: <50 NG/ML
APAP SERPL-MCNC: <5 UG/ML (ref 10–30)
BENZODIAZ SERPL QL: NEGATIVE
BUTALBITAL SERPL-MCNC: <50 NG/ML
PENTOBARB SERPL-MCNC: <50 NG/ML
PHENOBARB SERPL-MCNC: 123 NG/ML
SALICYLATES SERPL-MCNC: 1 MG/DL (ref 15–25)
SECOBARBITAL SERPL-MCNC: <50 NG/ML
TRICYCLICS SERPL QL: NEGATIVE

## 2021-05-03 ENCOUNTER — APPOINTMENT (OUTPATIENT)
Dept: PHYSICAL THERAPY | Facility: REHABILITATION | Age: 61
End: 2021-05-03
Attending: PHYSICAL MEDICINE & REHABILITATION
Payer: COMMERCIAL

## 2021-05-20 NOTE — DOCUMENTATION QUERY
"                                                                         [facility]                                                                       Query Response Note      PATIENT:               ANTOINETTE ROBERTSON  ACCT #:                  [id number]  MRN:                     [id number]  :                      1960  ADMIT DATE:       2021 8:49 AM  DISCH DATE:        2021 2:02 AM  RESPONDING  PROVIDER #:        [id number]           QUERY TEXT:    Anemia is documented in the Medical Record. Please specify the underlying cause of the anemia.    NOTE:  If the appropriate response is not listed below, please respond with a new note.              The patient's Clinical Indicators include:  Clinical Indicators:    * HGB 10.7, HCT 35, platelet count 93 on admit    Treatment or Monitoring:    * Transfusion  2 units each FFP & RBC's     Risk Factors:    * Patient has \" bleeding from multiple orafices including rectum, mouth and IV sites\" is noted in the consult note .     *  Patient has known Crohns disease with active rectal bleeding.     Respectfully submitted by,     [email or web address]  Options provided:   -- Blood loss anemia, acute   -- Blood loss anemia, chronic   -- Chronic anemia   -- Due to/in/with kidney failure   -- iron deficiency anemia   -- Macrocytic anemia   -- Microcytic anemia   -- Normocytic anemia   -- Pernicious anemia   -- Unable to determine      Query created by: Cecilia Vargas on 2021 7:03 AM    RESPONSE TEXT:    Blood loss anemia, acute          Electronically signed by:  VERA AVENDANO MD 2021 7:12 AM              "

## 2023-08-04 NOTE — ED NOTES
Assist RN:  Henry from Lab called with critical result of Ca 6.3, Na 119, CO2 5, Troponin 187, AST 2038, ALT 1713, and CPK 1947 at 1112. Critical lab result read back to Henry.   Dr. Garcia notified of critical lab result at 1112.  Critical lab result read back by Dr. Garcia. Primary RN also aware.   Yes

## 2024-10-07 NOTE — ASSESSMENT & PLAN NOTE
Post-TPA protocol initiated  Do not re-stick patient post TPA   DISPLAY PLAN FREE TEXT DISPLAY PLAN FREE TEXT DISPLAY PLAN FREE TEXT DISPLAY PLAN FREE TEXT DISPLAY PLAN FREE TEXT